# Patient Record
Sex: MALE | Race: WHITE | Employment: OTHER | URBAN - METROPOLITAN AREA
[De-identification: names, ages, dates, MRNs, and addresses within clinical notes are randomized per-mention and may not be internally consistent; named-entity substitution may affect disease eponyms.]

---

## 2020-01-01 ENCOUNTER — HOSPITAL ENCOUNTER (OUTPATIENT)
Age: 80
Setting detail: OUTPATIENT SURGERY
Discharge: HOME | End: 2020-12-14
Attending: INTERNAL MEDICINE | Admitting: INTERNAL MEDICINE
Payer: MEDICARE

## 2020-01-01 ENCOUNTER — PATIENT OUTREACH (OUTPATIENT)
Dept: CASE MANAGEMENT | Age: 80
End: 2020-01-01

## 2020-01-01 ENCOUNTER — HOSPITAL ENCOUNTER (OUTPATIENT)
Dept: MRI IMAGING | Age: 80
Discharge: HOME | End: 2020-12-30
Attending: INTERNAL MEDICINE
Payer: MEDICARE

## 2020-01-01 ENCOUNTER — HOSPITAL ENCOUNTER (OUTPATIENT)
Dept: INFUSION THERAPY | Age: 80
Discharge: HOME | End: 2020-12-28
Payer: MEDICARE

## 2020-01-01 ENCOUNTER — HOSPITAL ENCOUNTER (OUTPATIENT)
Dept: LAB | Age: 80
Discharge: HOME | End: 2020-12-28
Payer: MEDICARE

## 2020-01-01 ENCOUNTER — HOSPITAL ENCOUNTER (OUTPATIENT)
Dept: PET IMAGING | Age: 80
Discharge: HOME | End: 2020-12-22
Payer: MEDICARE

## 2020-01-01 VITALS
RESPIRATION RATE: 20 BRPM | BODY MASS INDEX: 30.06 KG/M2 | HEIGHT: 70 IN | DIASTOLIC BLOOD PRESSURE: 72 MMHG | OXYGEN SATURATION: 95 % | SYSTOLIC BLOOD PRESSURE: 142 MMHG | HEART RATE: 89 BPM | WEIGHT: 210 LBS | TEMPERATURE: 97.9 F

## 2020-01-01 DIAGNOSIS — C34.90 PRIMARY MALIGNANT NEOPLASM OF LUNG METASTATIC TO OTHER SITE, UNSPECIFIED LATERALITY (HCC): Primary | ICD-10-CM

## 2020-01-01 DIAGNOSIS — R91.8 LUNG MASS: ICD-10-CM

## 2020-01-01 DIAGNOSIS — C34.90 PRIMARY MALIGNANT NEOPLASM OF LUNG METASTATIC TO OTHER SITE, UNSPECIFIED LATERALITY (HCC): ICD-10-CM

## 2020-01-01 DIAGNOSIS — J90 PLEURAL EFFUSION: ICD-10-CM

## 2020-01-01 LAB
ALBUMIN SERPL-MCNC: 3 G/DL (ref 3.2–4.6)
ALBUMIN/GLOB SERPL: 0.6 {RATIO} (ref 1.2–3.5)
ALP SERPL-CCNC: 96 U/L (ref 50–136)
ALT SERPL-CCNC: 36 U/L (ref 12–65)
ANION GAP SERPL CALC-SCNC: 8 MMOL/L (ref 7–16)
APPEARANCE FLD: NORMAL
AST SERPL-CCNC: 17 U/L (ref 15–37)
BACTERIA SPEC CULT: NORMAL
BASOPHILS # BLD: 0.2 K/UL (ref 0–0.2)
BASOPHILS NFR BLD: 1 % (ref 0–2)
BILIRUB SERPL-MCNC: 0.3 MG/DL (ref 0.2–1.1)
BUN SERPL-MCNC: 26 MG/DL (ref 8–23)
CALCIUM SERPL-MCNC: 9.7 MG/DL (ref 8.3–10.4)
CHLORIDE SERPL-SCNC: 108 MMOL/L (ref 98–107)
CO2 SERPL-SCNC: 26 MMOL/L (ref 21–32)
COLOR FLD: NORMAL
CREAT SERPL-MCNC: 1.1 MG/DL (ref 0.8–1.5)
DIFFERENTIAL METHOD BLD: ABNORMAL
EOSINOPHIL # BLD: 2.7 K/UL (ref 0–0.8)
EOSINOPHIL NFR BLD: 14 % (ref 0.5–7.8)
EOSINOPHIL NFR BRONCH MANUAL: 7 %
ERYTHROCYTE [DISTWIDTH] IN BLOOD BY AUTOMATED COUNT: 14 % (ref 11.9–14.6)
FERRITIN SERPL-MCNC: 84 NG/ML (ref 8–388)
GLOBULIN SER CALC-MCNC: 4.8 G/DL (ref 2.3–3.5)
GLUCOSE FLD-MCNC: 104 MG/DL
GLUCOSE SERPL-MCNC: 104 MG/DL (ref 65–100)
GRAM STN SPEC: NORMAL
GRAM STN SPEC: NORMAL
HCT VFR BLD AUTO: 43.5 % (ref 41.1–50.3)
HGB BLD-MCNC: 14 G/DL (ref 13.6–17.2)
HGB RETIC QN AUTO: 33 PG (ref 29–35)
IMM GRANULOCYTES # BLD AUTO: 0.1 K/UL (ref 0–0.5)
IMM GRANULOCYTES NFR BLD AUTO: 0 % (ref 0–5)
IMM RETICS NFR: 8 % (ref 2.3–13.4)
IRON SATN MFR SERPL: 8 %
IRON SERPL-MCNC: 23 UG/DL (ref 35–150)
LDH FLD L TO P-CCNC: 384 U/L
LYMPHOCYTES # BLD: 1.6 K/UL (ref 0.5–4.6)
LYMPHOCYTES NFR BLD: 9 % (ref 13–44)
LYMPHOCYTES NFR BRONCH MANUAL: 86 %
MACROPHAGES NFR BRONCH MANUAL: 4 %
MAGNESIUM SERPL-MCNC: 2.3 MG/DL (ref 1.8–2.4)
MCH RBC QN AUTO: 28.9 PG (ref 26.1–32.9)
MCHC RBC AUTO-ENTMCNC: 32.2 G/DL (ref 31.4–35)
MCV RBC AUTO: 89.9 FL (ref 79.6–97.8)
MONOCYTES # BLD: 1.8 K/UL (ref 0.1–1.3)
MONOCYTES NFR BLD: 10 % (ref 4–12)
NEUTROPHILS NFR BRONCH MANUAL: 3 %
NEUTS SEG # BLD: 12.1 K/UL (ref 1.7–8.2)
NEUTS SEG NFR BLD: 66 % (ref 43–78)
NRBC # BLD: 0 K/UL (ref 0–0.2)
NUC CELL # FLD: 1868 /CU MM
PLATELET # BLD AUTO: 381 K/UL (ref 150–450)
PMV BLD AUTO: 8.8 FL (ref 9.4–12.3)
POTASSIUM SERPL-SCNC: 4.5 MMOL/L (ref 3.5–5.1)
PROT FLD-MCNC: 4.9 G/DL
PROT SERPL-MCNC: 7.8 G/DL (ref 6.3–8.2)
RBC # BLD AUTO: 4.84 M/UL (ref 4.23–5.67)
RBC # FLD: NORMAL /CU MM
RETICS # AUTO: 0.06 M/UL (ref 0.03–0.1)
RETICS/RBC NFR AUTO: 1.3 % (ref 0.3–2)
SERVICE CMNT-IMP: NORMAL
SODIUM SERPL-SCNC: 142 MMOL/L (ref 136–145)
SPECIMEN SOURCE FLD: NORMAL
TIBC SERPL-MCNC: 302 UG/DL (ref 250–450)
WBC # BLD AUTO: 18.4 K/UL (ref 4.3–11.1)

## 2020-01-01 PROCEDURE — 83735 ASSAY OF MAGNESIUM: CPT

## 2020-01-01 PROCEDURE — 77030014147 HC TY THORCENT PARA TELE -B: Performed by: INTERNAL MEDICINE

## 2020-01-01 PROCEDURE — 83615 LACTATE (LD) (LDH) ENZYME: CPT

## 2020-01-01 PROCEDURE — 88112 CYTOPATH CELL ENHANCE TECH: CPT

## 2020-01-01 PROCEDURE — 96372 THER/PROPH/DIAG INJ SC/IM: CPT

## 2020-01-01 PROCEDURE — A9575 INJ GADOTERATE MEGLUMI 0.1ML: HCPCS | Performed by: INTERNAL MEDICINE

## 2020-01-01 PROCEDURE — 76040000007: Performed by: INTERNAL MEDICINE

## 2020-01-01 PROCEDURE — 85025 COMPLETE CBC W/AUTO DIFF WBC: CPT

## 2020-01-01 PROCEDURE — 88341 IMHCHEM/IMCYTCHM EA ADD ANTB: CPT

## 2020-01-01 PROCEDURE — 82945 GLUCOSE OTHER FLUID: CPT

## 2020-01-01 PROCEDURE — 80053 COMPREHEN METABOLIC PANEL: CPT

## 2020-01-01 PROCEDURE — 89050 BODY FLUID CELL COUNT: CPT

## 2020-01-01 PROCEDURE — A9552 F18 FDG: HCPCS

## 2020-01-01 PROCEDURE — 36415 COLL VENOUS BLD VENIPUNCTURE: CPT

## 2020-01-01 PROCEDURE — 74011000636 HC RX REV CODE- 636: Performed by: INTERNAL MEDICINE

## 2020-01-01 PROCEDURE — 87102 FUNGUS ISOLATION CULTURE: CPT

## 2020-01-01 PROCEDURE — 32555 ASPIRATE PLEURA W/ IMAGING: CPT | Performed by: INTERNAL MEDICINE

## 2020-01-01 PROCEDURE — 84157 ASSAY OF PROTEIN OTHER: CPT

## 2020-01-01 PROCEDURE — 88342 IMHCHEM/IMCYTCHM 1ST ANTB: CPT

## 2020-01-01 PROCEDURE — 83550 IRON BINDING TEST: CPT

## 2020-01-01 PROCEDURE — 70553 MRI BRAIN STEM W/O & W/DYE: CPT

## 2020-01-01 PROCEDURE — 87205 SMEAR GRAM STAIN: CPT

## 2020-01-01 PROCEDURE — 74011250636 HC RX REV CODE- 250/636: Performed by: INTERNAL MEDICINE

## 2020-01-01 PROCEDURE — 85046 RETICYTE/HGB CONCENTRATE: CPT

## 2020-01-01 PROCEDURE — 87116 MYCOBACTERIA CULTURE: CPT

## 2020-01-01 PROCEDURE — 82728 ASSAY OF FERRITIN: CPT

## 2020-01-01 PROCEDURE — 88305 TISSUE EXAM BY PATHOLOGIST: CPT

## 2020-01-01 PROCEDURE — 2709999900 HC NON-CHARGEABLE SUPPLY: Performed by: INTERNAL MEDICINE

## 2020-01-01 RX ORDER — GADOTERATE MEGLUMINE 376.9 MG/ML
19 INJECTION INTRAVENOUS
Status: COMPLETED | OUTPATIENT
Start: 2020-01-01 | End: 2020-01-01

## 2020-01-01 RX ORDER — AMLODIPINE BESYLATE 5 MG/1
5 TABLET ORAL DAILY
COMMUNITY
End: 2021-01-01

## 2020-01-01 RX ORDER — CYANOCOBALAMIN 1000 UG/ML
1000 INJECTION, SOLUTION INTRAMUSCULAR; SUBCUTANEOUS ONCE
Status: COMPLETED | OUTPATIENT
Start: 2020-01-01 | End: 2020-01-01

## 2020-01-01 RX ORDER — SODIUM CHLORIDE 0.9 % (FLUSH) 0.9 %
10 SYRINGE (ML) INJECTION
Status: COMPLETED | OUTPATIENT
Start: 2020-01-01 | End: 2020-01-01

## 2020-01-01 RX ADMIN — Medication 10 ML: at 14:08

## 2020-01-01 RX ADMIN — DIATRIZOATE MEGLUMINE AND DIATRIZOATE SODIUM 10 ML: 660; 100 LIQUID ORAL; RECTAL at 14:08

## 2020-01-01 RX ADMIN — Medication 10 ML: at 11:40

## 2020-01-01 RX ADMIN — CYANOCOBALAMIN 1000 MCG: 1000 INJECTION, SOLUTION INTRAMUSCULAR; SUBCUTANEOUS at 16:21

## 2020-01-01 RX ADMIN — GADOTERATE MEGLUMINE 19 ML: 376.9 INJECTION INTRAVENOUS at 11:40

## 2020-12-14 PROBLEM — J90 PLEURAL EFFUSION: Status: ACTIVE | Noted: 2020-01-01

## 2020-12-14 NOTE — PROGRESS NOTES
Pt sat up on side of bed for thoracentesis. Consent obtained. Time out performed. Pts vitals monitored throughout procedure. Right ultrasound done and pic taken of pleural fluid. ~2300 ml serosanginous pleural fluid from right. Pt tolerated procedure well with no adverse rxn. Specimens sent to the lab x 3 and labeled appropriately. Site dressed appropriately. Lung sliding done and ultrasound findings reviewed by MD.       Discharge instructions reviewed with pt and wife then pt discharged into her care ambulating out of department.

## 2020-12-14 NOTE — DISCHARGE INSTRUCTIONS
Patient Education        Thoracentesis: What to Expect at Home  Your Recovery  Thoracentesis (say \"kywz-bl-nfq-ARJUN-sis\") is a procedure to remove fluid from the space between the lungs and the chest wall (pleural space). This procedure may also be called a \"chest tap. \" It's normal to have a small amount of fluid in the pleural space. But too much fluid can build up because of problems such as infection, heart failure, or lung cancer. The procedure may have been done to help with shortness of breath and pain caused by the fluid buildup. Or you may have had this procedure so the doctor could test the fluid to find the cause of the buildup. Your chest may be sore where the doctor put the needle or catheter into your skin (the puncture site). This usually gets better after a day or two. You can go back to work or your normal activities as soon as you feel up to it. If a large amount of pleural fluid was removed during the procedure, you will probably be able to breathe more easily. If more pleural fluid collects and needs to be removed, another thoracentesis may be done later. If the doctor sent the fluid to a lab for testing, it may take several days to get the results. The doctor or nurse will discuss the results with you. This care sheet gives you a general idea about how long it will take for you to recover. But each person recovers at a different pace. Follow the steps below to feel better as quickly as possible. How can you care for yourself at home? Activity    · Rest when you feel tired. Getting enough sleep will help you recover.     · Avoid strenuous activities, such as bicycle riding, jogging, weight lifting, or aerobic exercise, until your doctor says it is okay.     · You may shower. Do not take a bath until the puncture site has healed, or until your doctor tells you it is okay.     · Ask your doctor when you can drive again.     · You may need to take 1 or 2 days off from work.  It depends on the type of work you do and how you feel. Diet    · You can eat your normal diet.     · Drink plenty of fluids (unless your doctor tells you not to). Medicines    · Your doctor will tell you if and when you can restart your medicines. He or she will also give you instructions about taking any new medicines.     · If you take aspirin or some other blood thinner, ask your doctor if and when to start taking it again. Make sure that you understand exactly what your doctor wants you to do.     · Be safe with medicines. Take pain medicines exactly as directed. ? If the doctor gave you a prescription medicine for pain, take it as prescribed. ? If you are not taking a prescription pain medicine, ask your doctor if you can take an over-the-counter medicine. ? Do not take two or more pain medicines at the same time unless the doctor told you to. Many pain medicines have acetaminophen, which is Tylenol. Too much acetaminophen (Tylenol) can be harmful.     · If you think your pain medicine is making you sick to your stomach:  ? Take your medicine after meals (unless your doctor has told you not to). ? Ask your doctor for a different pain medicine.     · If your doctor prescribed antibiotics, take them as directed. Do not stop taking them just because you feel better. You need to take the full course of antibiotics. Care of the puncture site    · Wash the area daily with warm, soapy water, and pat it dry. Don't use hydrogen peroxide or alcohol, which may delay healing. You may cover the area with a gauze bandage if it weeps or rubs against clothing. Change the bandage every day.     · Keep the area clean and dry. Follow-up care is a key part of your treatment and safety. Be sure to make and go to all appointments, and call your doctor if you are having problems. It's also a good idea to know your test results and keep a list of the medicines you take.     Please keep bandage dry for next 24 hours and keep all follow-up appointments. Please call [facility] @ [phone] for questions or concerns. When should you call for help? Call 911 anytime you think you may need emergency care. For example, call if:    · You passed out (lost consciousness).     · You have severe trouble breathing.     · You have sudden chest pain and shortness of breath, or you cough up blood. Call your doctor now or seek immediate medical care if:    · You have shortness of breath that is new or getting worse.     · You have new or worse pain in your chest, especially when you take a deep breath.     · You are sick to your stomach or cannot keep fluids down.     · You have a fever over 100°F.     · Bright red blood has soaked through the bandage over your puncture site.     · You have signs of infection, such as:  ? Increased pain, swelling, warmth, or redness. ? Red streaks leading from the puncture site. ? Pus draining from the puncture site. ? Swollen lymph nodes in your neck, armpits, or groin. ? A fever.     · You cough up a lot more mucus than normal, or your mucus changes color. Watch closely for changes in your health, and be sure to contact your doctor if you have any problems. Where can you learn more? Go to http://www.gray.com/  Enter Q755 in the search box to learn more about \"Thoracentesis: What to Expect at Home. \"  Current as of: February 24, 2020               Content Version: 12.6  © 8704-3343 [employer]. Care instructions adapted under license by [employer] (which disclaims liability or warranty for this information). If you have questions about a medical condition or this instruction, always ask your healthcare professional. [place] [address] any warranty or liability for your use of this information.

## 2020-12-14 NOTE — PROCEDURES
PROCEDURE:    DIAGNOSTIC/THERAPEUTIC THORACENTESIS/PLEURAL MANNOMETRY. PRE-OP DIAGNOSIS:    RPLEURAL EFFUSION    POST-OP DIAGNOSIS:    R PLEURAL EFFUSION    ASSISTANT:    Plumbly    ANESTHESIA:    LOCAL ANESTHESIA WITH 1% LIDOCAINE 10 CC TOTAL. CHEST ULTRASOUND FINDINGS:    A Turbo-M, Sonosite ultrasound with a 5-16 mHz probe was used to image the chest and localize the pleural effusion on the Left/and/Right chest.    A large/ anechoic space was seen on theRight consistent with an uncomplicated pleural effusion. DESCRIPTION OF PROCEDURE:    After obtaining informed consent and localizing the safest location for thoracentesis, the  9th intercostal space was marked with a blunt, plastic needle cap in the mid scapular line. An [employer] AK-0100 Pleral-Seal thoracentesis kit was used to perform the procedure. The skin was  cleansed with the supplied  chlorhexididne swab and then draped in the usual fasion. Using the previously marked location as a giude, a 22 G 1.5 inch needle was used to inject 10 cc of 1% lidocaine into the skin and subcutaneous tissue, as well as onto the underlying rib and inter-costal muscles, pleural fluid was aspirated to assure proper location, prior to removing the anesthesia needle. A 3mm  incision was then made, with the supplied scalpel in the usual fashion to facilitate the insertiopn of the thoracentesis needle. The needle with an 8French thoracentesis catheter was then introduced into the chest through the previously made incision in the usual fashion, the rib localized with the needle, and the catheter then marched over the rib into the pleural space. After aspirating fluid, the thoracentesis catheter was then placed into the chest using the needle itself as a trocar. The needle was then removed and the catheter was attached to the supplied tubing without complication.     2200 cc of /Yellow/ fluid, was aspirated and sent for analysis. Fluid was sent for the following tests:    Cell count with differential  LDH  Glucose  Total protein  Cytology    AFB  Fungus  Routine culture and Gram stain      Post procedure US confirmed complete/incomplete drainage of the effusion.     EBL:     minimal      COMPLICATIONS:    none    Pawel Baca MD

## 2020-12-14 NOTE — H&P
Date of Surgery Update:  Rashaun South was seen and examined. History and physical has been reviewed. The patient has been examined.  There have been no significant clinical changes since the completion of the originally dated History and Physical.    Signed By: Zenia Donaldson MD     December 14, 2020 10:17 AM

## 2020-12-28 PROBLEM — C34.90 PRIMARY MALIGNANT NEOPLASM OF LUNG METASTATIC TO OTHER SITE (HCC): Status: ACTIVE | Noted: 2020-01-01

## 2020-12-28 NOTE — PROGRESS NOTES
Pt arrived ambulatory to [facility]. Vit B12 IM. Pt aware of next appt on 1/13/21 at 0830. Discharged ambulatory.

## 2020-12-29 NOTE — PROGRESS NOTES
I saw patient on 12-28-20 with Dr Obinna Goodson. He presents today with wife. After much discussion, he hs decided to do chemo/ immunotherapy. We discussed port placement and port book was reviewed with patient. We will schedule chemo ed/FC and chemo. NGS sent on 12-29-20 on Cytology studies. Dr Obinna Goodson stated okay to do adrenal bx if needed if caris does not have enough tissue with Cytology studies.

## 2020-12-30 NOTE — PROGRESS NOTES
I spoke with patient and he stated he was having more pain lately especially at night. I talked to him about our palliative care dept and comfort care. Next appt would be 1-6-21 but I requested pt call if he felt he needed to be seen sooner. Pt was in agreement with this.

## 2021-01-01 ENCOUNTER — APPOINTMENT (OUTPATIENT)
Dept: CT IMAGING | Age: 81
DRG: 871 | End: 2021-01-01
Attending: INTERNAL MEDICINE
Payer: MEDICARE

## 2021-01-01 ENCOUNTER — HOSPITAL ENCOUNTER (OUTPATIENT)
Dept: LAB | Age: 81
Discharge: HOME | End: 2021-01-19
Payer: MEDICARE

## 2021-01-01 ENCOUNTER — HOSPITAL ENCOUNTER (OUTPATIENT)
Dept: RADIATION ONCOLOGY | Age: 81
Discharge: HOME | End: 2021-03-09
Payer: MEDICARE

## 2021-01-01 ENCOUNTER — APPOINTMENT (OUTPATIENT)
Dept: INFUSION THERAPY | Age: 81
End: 2021-01-01

## 2021-01-01 ENCOUNTER — HOSPITAL ENCOUNTER (OUTPATIENT)
Dept: CT IMAGING | Age: 81
Setting detail: OUTPATIENT SURGERY
Discharge: HOME | End: 2021-01-11
Attending: INTERNAL MEDICINE | Admitting: INTERNAL MEDICINE
Payer: MEDICARE

## 2021-01-01 ENCOUNTER — HOSPITAL ENCOUNTER (OUTPATIENT)
Dept: LAB | Age: 81
Discharge: HOME | End: 2021-01-12
Payer: MEDICARE

## 2021-01-01 ENCOUNTER — HOSPITAL ENCOUNTER (OUTPATIENT)
Dept: LAB | Age: 81
Discharge: HOME | End: 2021-02-02
Payer: MEDICARE

## 2021-01-01 ENCOUNTER — HOSPITAL ENCOUNTER (OUTPATIENT)
Dept: INFUSION THERAPY | Age: 81
Discharge: HOME | End: 2021-01-14
Payer: MEDICARE

## 2021-01-01 ENCOUNTER — HOSPITAL ENCOUNTER (OUTPATIENT)
Age: 81
Setting detail: OUTPATIENT SURGERY
Discharge: HOME | End: 2021-01-07
Attending: INTERNAL MEDICINE | Admitting: INTERNAL MEDICINE
Payer: MEDICARE

## 2021-01-01 ENCOUNTER — APPOINTMENT (OUTPATIENT)
Dept: GENERAL RADIOLOGY | Age: 81
DRG: 871 | End: 2021-01-01
Attending: EMERGENCY MEDICINE
Payer: MEDICARE

## 2021-01-01 ENCOUNTER — HOSPITAL ENCOUNTER (OUTPATIENT)
Dept: RADIATION ONCOLOGY | Age: 81
Discharge: HOME | End: 2021-03-10
Payer: MEDICARE

## 2021-01-01 ENCOUNTER — APPOINTMENT (OUTPATIENT)
Dept: RADIATION ONCOLOGY | Age: 81
End: 2021-01-01
Payer: MEDICARE

## 2021-01-01 ENCOUNTER — HOSPITAL ENCOUNTER (OUTPATIENT)
Dept: GENERAL RADIOLOGY | Age: 81
Discharge: HOME | End: 2021-01-06

## 2021-01-01 ENCOUNTER — HOSPITAL ENCOUNTER (OUTPATIENT)
Dept: RADIATION ONCOLOGY | Age: 81
Discharge: HOME | End: 2021-02-17
Payer: MEDICARE

## 2021-01-01 ENCOUNTER — HOSPITAL ENCOUNTER (OUTPATIENT)
Dept: RADIATION ONCOLOGY | Age: 81
Discharge: HOME | End: 2021-03-08
Payer: MEDICARE

## 2021-01-01 ENCOUNTER — HOSPICE ADMISSION (OUTPATIENT)
Dept: HOSPICE | Facility: HOSPICE | Age: 81
End: 2021-01-01
Payer: MEDICARE

## 2021-01-01 ENCOUNTER — HOSPITAL ENCOUNTER (INPATIENT)
Age: 81
LOS: 11 days | End: 2021-03-30
Attending: INTERNAL MEDICINE | Admitting: INTERNAL MEDICINE
Payer: MEDICARE

## 2021-01-01 ENCOUNTER — APPOINTMENT (OUTPATIENT)
Dept: GENERAL RADIOLOGY | Age: 81
DRG: 071 | End: 2021-01-01
Attending: EMERGENCY MEDICINE
Payer: MEDICARE

## 2021-01-01 ENCOUNTER — HOSPITAL ENCOUNTER (OUTPATIENT)
Dept: LAB | Age: 81
Discharge: HOME | End: 2021-01-27
Payer: MEDICARE

## 2021-01-01 ENCOUNTER — PATIENT OUTREACH (OUTPATIENT)
Dept: CASE MANAGEMENT | Age: 81
End: 2021-01-01

## 2021-01-01 ENCOUNTER — APPOINTMENT (OUTPATIENT)
Dept: GENERAL RADIOLOGY | Age: 81
DRG: 871 | End: 2021-01-01
Attending: INTERNAL MEDICINE
Payer: MEDICARE

## 2021-01-01 ENCOUNTER — HOSPITAL ENCOUNTER (INPATIENT)
Age: 81
LOS: 7 days | Discharge: HOSPICE | DRG: 871 | End: 2021-03-19
Attending: EMERGENCY MEDICINE | Admitting: FAMILY MEDICINE
Payer: MEDICARE

## 2021-01-01 ENCOUNTER — APPOINTMENT (OUTPATIENT)
Dept: MRI IMAGING | Age: 81
DRG: 071 | End: 2021-01-01
Attending: PSYCHIATRY & NEUROLOGY
Payer: MEDICARE

## 2021-01-01 ENCOUNTER — HOSPITAL ENCOUNTER (OUTPATIENT)
Dept: INTERVENTIONAL RADIOLOGY/VASCULAR | Age: 81
Discharge: HOME | End: 2021-01-07
Attending: INTERNAL MEDICINE
Payer: MEDICARE

## 2021-01-01 ENCOUNTER — HOSPITAL ENCOUNTER (OUTPATIENT)
Dept: RADIATION ONCOLOGY | Age: 81
Discharge: HOME | End: 2021-03-03
Payer: MEDICARE

## 2021-01-01 ENCOUNTER — HOSPITAL ENCOUNTER (OUTPATIENT)
Dept: RADIATION ONCOLOGY | Age: 81
Discharge: HOME | End: 2021-02-18
Payer: MEDICARE

## 2021-01-01 ENCOUNTER — HOSPITAL ENCOUNTER (OUTPATIENT)
Dept: RADIATION ONCOLOGY | Age: 81
Discharge: HOME | End: 2021-02-23
Payer: MEDICARE

## 2021-01-01 ENCOUNTER — APPOINTMENT (OUTPATIENT)
Dept: ULTRASOUND IMAGING | Age: 81
DRG: 071 | End: 2021-01-01
Attending: INTERNAL MEDICINE
Payer: MEDICARE

## 2021-01-01 ENCOUNTER — APPOINTMENT (OUTPATIENT)
Dept: GENERAL RADIOLOGY | Age: 81
DRG: 871 | End: 2021-01-01
Attending: FAMILY MEDICINE
Payer: MEDICARE

## 2021-01-01 ENCOUNTER — ANESTHESIA (OUTPATIENT)
Dept: SURGERY | Age: 81
End: 2021-01-01
Payer: MEDICARE

## 2021-01-01 ENCOUNTER — HOSPITAL ENCOUNTER (OUTPATIENT)
Dept: LAB | Age: 81
Discharge: HOME | End: 2021-02-23
Payer: MEDICARE

## 2021-01-01 ENCOUNTER — HOSPITAL ENCOUNTER (OUTPATIENT)
Dept: MRI IMAGING | Age: 81
Discharge: HOME | End: 2021-02-17
Attending: INTERNAL MEDICINE
Payer: MEDICARE

## 2021-01-01 ENCOUNTER — HOSPITAL ENCOUNTER (OUTPATIENT)
Dept: RADIATION ONCOLOGY | Age: 81
Discharge: HOME | End: 2021-03-01
Payer: MEDICARE

## 2021-01-01 ENCOUNTER — HOSPITAL ENCOUNTER (OUTPATIENT)
Dept: CT IMAGING | Age: 81
Discharge: HOME | End: 2021-01-13
Attending: INTERNAL MEDICINE | Admitting: INTERNAL MEDICINE
Payer: MEDICARE

## 2021-01-01 ENCOUNTER — HOSPITAL ENCOUNTER (OUTPATIENT)
Dept: INFUSION THERAPY | Age: 81
End: 2021-01-01

## 2021-01-01 ENCOUNTER — HOSPITAL ENCOUNTER (OUTPATIENT)
Dept: ULTRASOUND IMAGING | Age: 81
Discharge: HOME | End: 2021-01-04
Attending: NURSE PRACTITIONER
Payer: MEDICARE

## 2021-01-01 ENCOUNTER — HOSPITAL ENCOUNTER (INPATIENT)
Age: 81
LOS: 5 days | Discharge: NURSING, REHAB OR LONG-TERM CARE | DRG: 071 | End: 2021-03-09
Attending: EMERGENCY MEDICINE | Admitting: INTERNAL MEDICINE
Payer: MEDICARE

## 2021-01-01 ENCOUNTER — HOSPITAL ENCOUNTER (OUTPATIENT)
Dept: RADIATION ONCOLOGY | Age: 81
Discharge: HOME | End: 2021-03-11
Payer: MEDICARE

## 2021-01-01 ENCOUNTER — APPOINTMENT (OUTPATIENT)
Dept: CT IMAGING | Age: 81
DRG: 071 | End: 2021-01-01
Attending: EMERGENCY MEDICINE
Payer: MEDICARE

## 2021-01-01 ENCOUNTER — TELEPHONE (OUTPATIENT)
Dept: CASE MANAGEMENT | Age: 81
End: 2021-01-01

## 2021-01-01 ENCOUNTER — APPOINTMENT (OUTPATIENT)
Dept: ULTRASOUND IMAGING | Age: 81
DRG: 871 | End: 2021-01-01
Attending: INTERNAL MEDICINE
Payer: MEDICARE

## 2021-01-01 ENCOUNTER — HOSPITAL ENCOUNTER (OUTPATIENT)
Dept: RADIATION ONCOLOGY | Age: 81
Discharge: HOME | End: 2021-02-24
Payer: MEDICARE

## 2021-01-01 ENCOUNTER — HOSPITAL ENCOUNTER (OUTPATIENT)
Dept: PET IMAGING | Age: 81
Discharge: HOME | End: 2021-02-16
Payer: MEDICARE

## 2021-01-01 ENCOUNTER — HOSPITAL ENCOUNTER (OUTPATIENT)
Dept: RADIATION ONCOLOGY | Age: 81
Discharge: HOME | End: 2021-03-02
Payer: MEDICARE

## 2021-01-01 ENCOUNTER — HOSPITAL ENCOUNTER (OUTPATIENT)
Dept: INFUSION THERAPY | Age: 81
Discharge: HOME | End: 2021-02-03
Payer: MEDICARE

## 2021-01-01 ENCOUNTER — APPOINTMENT (OUTPATIENT)
Dept: GENERAL RADIOLOGY | Age: 81
DRG: 071 | End: 2021-01-01
Attending: NURSE PRACTITIONER
Payer: MEDICARE

## 2021-01-01 ENCOUNTER — ANESTHESIA EVENT (OUTPATIENT)
Dept: SURGERY | Age: 81
End: 2021-01-01
Payer: MEDICARE

## 2021-01-01 VITALS
DIASTOLIC BLOOD PRESSURE: 77 MMHG | OXYGEN SATURATION: 94 % | HEART RATE: 82 BPM | TEMPERATURE: 98.6 F | SYSTOLIC BLOOD PRESSURE: 146 MMHG | BODY MASS INDEX: 31.39 KG/M2 | RESPIRATION RATE: 16 BRPM | WEIGHT: 200 LBS | HEIGHT: 67 IN

## 2021-01-01 VITALS
HEART RATE: 100 BPM | RESPIRATION RATE: 18 BRPM | DIASTOLIC BLOOD PRESSURE: 36 MMHG | SYSTOLIC BLOOD PRESSURE: 69 MMHG | TEMPERATURE: 99.1 F

## 2021-01-01 VITALS
OXYGEN SATURATION: 96 % | WEIGHT: 205 LBS | DIASTOLIC BLOOD PRESSURE: 75 MMHG | HEART RATE: 76 BPM | RESPIRATION RATE: 18 BRPM | BODY MASS INDEX: 32.11 KG/M2 | TEMPERATURE: 97.9 F | SYSTOLIC BLOOD PRESSURE: 120 MMHG

## 2021-01-01 VITALS
SYSTOLIC BLOOD PRESSURE: 148 MMHG | WEIGHT: 207.2 LBS | DIASTOLIC BLOOD PRESSURE: 92 MMHG | BODY MASS INDEX: 32.45 KG/M2 | TEMPERATURE: 96.4 F | OXYGEN SATURATION: 97 % | HEART RATE: 106 BPM

## 2021-01-01 VITALS
HEART RATE: 71 BPM | HEIGHT: 72 IN | TEMPERATURE: 97.7 F | WEIGHT: 200 LBS | BODY MASS INDEX: 27.09 KG/M2 | DIASTOLIC BLOOD PRESSURE: 94 MMHG | RESPIRATION RATE: 18 BRPM | OXYGEN SATURATION: 97 % | SYSTOLIC BLOOD PRESSURE: 163 MMHG

## 2021-01-01 VITALS
DIASTOLIC BLOOD PRESSURE: 81 MMHG | WEIGHT: 204 LBS | OXYGEN SATURATION: 95 % | RESPIRATION RATE: 18 BRPM | SYSTOLIC BLOOD PRESSURE: 157 MMHG | HEART RATE: 98 BPM | BODY MASS INDEX: 31.95 KG/M2 | TEMPERATURE: 97.9 F

## 2021-01-01 VITALS
HEART RATE: 91 BPM | OXYGEN SATURATION: 99 % | TEMPERATURE: 97.8 F | RESPIRATION RATE: 18 BRPM | SYSTOLIC BLOOD PRESSURE: 162 MMHG | DIASTOLIC BLOOD PRESSURE: 80 MMHG

## 2021-01-01 VITALS
OXYGEN SATURATION: 96 % | DIASTOLIC BLOOD PRESSURE: 96 MMHG | HEART RATE: 104 BPM | HEIGHT: 67 IN | TEMPERATURE: 97.4 F | RESPIRATION RATE: 30 BRPM | SYSTOLIC BLOOD PRESSURE: 181 MMHG | BODY MASS INDEX: 32.32 KG/M2 | WEIGHT: 205.91 LBS

## 2021-01-01 VITALS
RESPIRATION RATE: 20 BRPM | OXYGEN SATURATION: 99 % | TEMPERATURE: 97.9 F | DIASTOLIC BLOOD PRESSURE: 65 MMHG | HEART RATE: 88 BPM | HEIGHT: 67 IN | SYSTOLIC BLOOD PRESSURE: 135 MMHG | WEIGHT: 205 LBS | BODY MASS INDEX: 32.18 KG/M2

## 2021-01-01 VITALS
TEMPERATURE: 98.1 F | RESPIRATION RATE: 18 BRPM | DIASTOLIC BLOOD PRESSURE: 79 MMHG | SYSTOLIC BLOOD PRESSURE: 162 MMHG | OXYGEN SATURATION: 95 % | HEART RATE: 107 BPM

## 2021-01-01 DIAGNOSIS — J90 PLEURAL EFFUSION: ICD-10-CM

## 2021-01-01 DIAGNOSIS — I63.9 CEREBROVASCULAR ACCIDENT (CVA), UNSPECIFIED MECHANISM (HCC): Primary | ICD-10-CM

## 2021-01-01 DIAGNOSIS — E87.0 HYPERNATREMIA: ICD-10-CM

## 2021-01-01 DIAGNOSIS — R91.8 OTHER NONSPECIFIC ABNORMAL FINDING OF LUNG FIELD: ICD-10-CM

## 2021-01-01 DIAGNOSIS — N17.9 AKI (ACUTE KIDNEY INJURY) (HCC): ICD-10-CM

## 2021-01-01 DIAGNOSIS — D72.9 NEUTROPHILIC LEUKOCYTOSIS: ICD-10-CM

## 2021-01-01 DIAGNOSIS — J18.9 HCAP (HEALTHCARE-ASSOCIATED PNEUMONIA): ICD-10-CM

## 2021-01-01 DIAGNOSIS — C34.91 NON-SMALL CELL CANCER OF RIGHT LUNG (HCC): ICD-10-CM

## 2021-01-01 DIAGNOSIS — C34.91 MALIGNANT NEOPLASM OF RIGHT LUNG, UNSPECIFIED PART OF LUNG (HCC): ICD-10-CM

## 2021-01-01 DIAGNOSIS — C34.90 PRIMARY MALIGNANT NEOPLASM OF LUNG METASTATIC TO OTHER SITE, UNSPECIFIED LATERALITY (HCC): ICD-10-CM

## 2021-01-01 DIAGNOSIS — C34.91 ADENOCARCINOMA, LUNG, RIGHT (HCC): ICD-10-CM

## 2021-01-01 DIAGNOSIS — J96.01 ACUTE RESPIRATORY FAILURE WITH HYPOXIA (HCC): ICD-10-CM

## 2021-01-01 DIAGNOSIS — C34.90 NON-SMALL CELL LUNG CANCER, UNSPECIFIED LATERALITY (HCC): ICD-10-CM

## 2021-01-01 DIAGNOSIS — C34.90 NON-SMALL CELL LUNG CANCER, UNSPECIFIED LATERALITY (HCC): Primary | ICD-10-CM

## 2021-01-01 DIAGNOSIS — Z79.899 HIGH RISK MEDICATION USE: ICD-10-CM

## 2021-01-01 DIAGNOSIS — C34.91 ADENOCARCINOMA OF RIGHT LUNG (HCC): ICD-10-CM

## 2021-01-01 DIAGNOSIS — E44.1 MILD PROTEIN-CALORIE MALNUTRITION (HCC): Chronic | ICD-10-CM

## 2021-01-01 DIAGNOSIS — R65.20 SEVERE SEPSIS (HCC): ICD-10-CM

## 2021-01-01 DIAGNOSIS — J96.01 ACUTE HYPOXEMIC RESPIRATORY FAILURE (HCC): ICD-10-CM

## 2021-01-01 DIAGNOSIS — F01.52: ICD-10-CM

## 2021-01-01 DIAGNOSIS — G92.8 TOXIC METABOLIC ENCEPHALOPATHY: ICD-10-CM

## 2021-01-01 DIAGNOSIS — G95.9 SPINAL CORD LESION (HCC): ICD-10-CM

## 2021-01-01 DIAGNOSIS — A41.9 SEVERE SEPSIS (HCC): ICD-10-CM

## 2021-01-01 DIAGNOSIS — R29.90 STROKE-LIKE SYMPTOMS: ICD-10-CM

## 2021-01-01 DIAGNOSIS — M79.89 LEFT LEG SWELLING: ICD-10-CM

## 2021-01-01 DIAGNOSIS — R33.9 URINARY RETENTION: ICD-10-CM

## 2021-01-01 LAB
ACC. NO. FROM MICRO ORDER, ACCP: ABNORMAL
ACID FAST STN SPEC: NEGATIVE
ALBUMIN SERPL-MCNC: 2 G/DL (ref 3.2–4.6)
ALBUMIN SERPL-MCNC: 2.3 G/DL (ref 3.2–4.6)
ALBUMIN SERPL-MCNC: 2.4 G/DL (ref 3.2–4.6)
ALBUMIN SERPL-MCNC: 2.7 G/DL (ref 3.2–4.6)
ALBUMIN SERPL-MCNC: 2.7 G/DL (ref 3.2–4.6)
ALBUMIN SERPL-MCNC: 2.8 G/DL (ref 3.2–4.6)
ALBUMIN/GLOB SERPL: 0.5 {RATIO} (ref 1.2–3.5)
ALBUMIN/GLOB SERPL: 0.5 {RATIO} (ref 1.2–3.5)
ALBUMIN/GLOB SERPL: 0.6 {RATIO} (ref 1.2–3.5)
ALBUMIN/GLOB SERPL: 0.8 {RATIO} (ref 1.2–3.5)
ALP SERPL-CCNC: 102 U/L (ref 50–136)
ALP SERPL-CCNC: 108 U/L (ref 50–136)
ALP SERPL-CCNC: 119 U/L (ref 50–136)
ALP SERPL-CCNC: 70 U/L (ref 50–136)
ALP SERPL-CCNC: 88 U/L (ref 50–136)
ALP SERPL-CCNC: 92 U/L (ref 50–136)
ALT SERPL-CCNC: 110 U/L (ref 12–65)
ALT SERPL-CCNC: 34 U/L (ref 12–65)
ALT SERPL-CCNC: 34 U/L (ref 12–65)
ALT SERPL-CCNC: 54 U/L (ref 12–65)
ALT SERPL-CCNC: 67 U/L (ref 12–65)
ALT SERPL-CCNC: 74 U/L (ref 12–65)
AMMONIA PLAS-SCNC: 27 UMOL/L (ref 11–32)
ANION GAP SERPL CALC-SCNC: 3 MMOL/L (ref 7–16)
ANION GAP SERPL CALC-SCNC: 5 MMOL/L (ref 7–16)
ANION GAP SERPL CALC-SCNC: 6 MMOL/L (ref 7–16)
ANION GAP SERPL CALC-SCNC: 7 MMOL/L (ref 7–16)
ANION GAP SERPL CALC-SCNC: 8 MMOL/L (ref 7–16)
APPEARANCE UR: ABNORMAL
APPEARANCE UR: CLEAR
ARTERIAL PATENCY WRIST A: YES
ARTERIAL PATENCY WRIST A: YES
AST SERPL-CCNC: 14 U/L (ref 15–37)
AST SERPL-CCNC: 20 U/L (ref 15–37)
AST SERPL-CCNC: 20 U/L (ref 15–37)
AST SERPL-CCNC: 23 U/L (ref 15–37)
AST SERPL-CCNC: 31 U/L (ref 15–37)
AST SERPL-CCNC: 9 U/L (ref 15–37)
ATRIAL RATE: 107 BPM
ATRIAL RATE: 61 BPM
ATRIAL RATE: 99 BPM
BACTERIA SPEC CULT: ABNORMAL
BACTERIA SPEC CULT: ABNORMAL
BACTERIA SPEC CULT: NORMAL
BACTERIA URNS QL MICRO: ABNORMAL /HPF
BASE DEFICIT BLD-SCNC: 1 MMOL/L
BASE DEFICIT BLD-SCNC: 2 MMOL/L
BASOPHILS # BLD: 0 K/UL (ref 0–0.2)
BASOPHILS # BLD: 0.1 K/UL (ref 0–0.2)
BASOPHILS # BLD: 0.3 K/UL (ref 0–0.2)
BASOPHILS NFR BLD: 0 % (ref 0–2)
BASOPHILS NFR BLD: 1 % (ref 0–2)
BDY SITE: ABNORMAL
BDY SITE: ABNORMAL
BILIRUB SERPL-MCNC: 0.2 MG/DL (ref 0.2–1.1)
BILIRUB SERPL-MCNC: 0.3 MG/DL (ref 0.2–1.1)
BILIRUB SERPL-MCNC: 0.5 MG/DL (ref 0.2–1.1)
BILIRUB SERPL-MCNC: 0.6 MG/DL (ref 0.2–1.1)
BILIRUB UR QL: NEGATIVE
BILIRUB UR QL: NEGATIVE
BUN SERPL-MCNC: 111 MG/DL (ref 8–23)
BUN SERPL-MCNC: 122 MG/DL (ref 8–23)
BUN SERPL-MCNC: 123 MG/DL (ref 8–23)
BUN SERPL-MCNC: 127 MG/DL (ref 8–23)
BUN SERPL-MCNC: 25 MG/DL (ref 8–23)
BUN SERPL-MCNC: 25 MG/DL (ref 8–23)
BUN SERPL-MCNC: 30 MG/DL (ref 8–23)
BUN SERPL-MCNC: 37 MG/DL (ref 8–23)
BUN SERPL-MCNC: 41 MG/DL (ref 8–23)
BUN SERPL-MCNC: 41 MG/DL (ref 8–23)
BUN SERPL-MCNC: 44 MG/DL (ref 8–23)
BUN SERPL-MCNC: 46 MG/DL (ref 8–23)
BUN SERPL-MCNC: 52 MG/DL (ref 8–23)
BUN SERPL-MCNC: 53 MG/DL (ref 8–23)
BUN SERPL-MCNC: 56 MG/DL (ref 8–23)
BUN SERPL-MCNC: 69 MG/DL (ref 8–23)
BUN SERPL-MCNC: 74 MG/DL (ref 8–23)
BUN SERPL-MCNC: 75 MG/DL (ref 8–23)
CALCIUM SERPL-MCNC: 7.9 MG/DL (ref 8.3–10.4)
CALCIUM SERPL-MCNC: 8.1 MG/DL (ref 8.3–10.4)
CALCIUM SERPL-MCNC: 8.1 MG/DL (ref 8.3–10.4)
CALCIUM SERPL-MCNC: 8.3 MG/DL (ref 8.3–10.4)
CALCIUM SERPL-MCNC: 8.4 MG/DL (ref 8.3–10.4)
CALCIUM SERPL-MCNC: 8.5 MG/DL (ref 8.3–10.4)
CALCIUM SERPL-MCNC: 8.6 MG/DL (ref 8.3–10.4)
CALCIUM SERPL-MCNC: 8.8 MG/DL (ref 8.3–10.4)
CALCIUM SERPL-MCNC: 8.9 MG/DL (ref 8.3–10.4)
CALCIUM SERPL-MCNC: 8.9 MG/DL (ref 8.3–10.4)
CALCIUM SERPL-MCNC: 9 MG/DL (ref 8.3–10.4)
CALCIUM SERPL-MCNC: 9.2 MG/DL (ref 8.3–10.4)
CALCIUM SERPL-MCNC: 9.4 MG/DL (ref 8.3–10.4)
CALCIUM SERPL-MCNC: 9.6 MG/DL (ref 8.3–10.4)
CALCULATED P AXIS, ECG09: 46 DEGREES
CALCULATED R AXIS, ECG10: 84 DEGREES
CALCULATED R AXIS, ECG10: 84 DEGREES
CALCULATED R AXIS, ECG10: 88 DEGREES
CALCULATED T AXIS, ECG11: 32 DEGREES
CALCULATED T AXIS, ECG11: 33 DEGREES
CALCULATED T AXIS, ECG11: 46 DEGREES
CASTS URNS QL MICRO: ABNORMAL /LPF
CHLORIDE SERPL-SCNC: 104 MMOL/L (ref 98–107)
CHLORIDE SERPL-SCNC: 106 MMOL/L (ref 98–107)
CHLORIDE SERPL-SCNC: 107 MMOL/L (ref 98–107)
CHLORIDE SERPL-SCNC: 107 MMOL/L (ref 98–107)
CHLORIDE SERPL-SCNC: 109 MMOL/L (ref 98–107)
CHLORIDE SERPL-SCNC: 111 MMOL/L (ref 98–107)
CHLORIDE SERPL-SCNC: 112 MMOL/L (ref 98–107)
CHLORIDE SERPL-SCNC: 112 MMOL/L (ref 98–107)
CHLORIDE SERPL-SCNC: 113 MMOL/L (ref 98–107)
CHLORIDE SERPL-SCNC: 115 MMOL/L (ref 98–107)
CHLORIDE SERPL-SCNC: 121 MMOL/L (ref 98–107)
CHLORIDE SERPL-SCNC: 122 MMOL/L (ref 98–107)
CHLORIDE SERPL-SCNC: 124 MMOL/L (ref 98–107)
CHLORIDE SERPL-SCNC: 127 MMOL/L (ref 98–107)
CHOLEST SERPL-MCNC: 161 MG/DL
CO2 BLD-SCNC: 24 MMOL/L
CO2 BLD-SCNC: 24 MMOL/L (ref 13–23)
CO2 SERPL-SCNC: 22 MMOL/L (ref 21–32)
CO2 SERPL-SCNC: 24 MMOL/L (ref 21–32)
CO2 SERPL-SCNC: 25 MMOL/L (ref 21–32)
CO2 SERPL-SCNC: 26 MMOL/L (ref 21–32)
CO2 SERPL-SCNC: 27 MMOL/L (ref 21–32)
CO2 SERPL-SCNC: 28 MMOL/L (ref 21–32)
CO2 SERPL-SCNC: 28 MMOL/L (ref 21–32)
CO2 SERPL-SCNC: 29 MMOL/L (ref 21–32)
CO2 SERPL-SCNC: 30 MMOL/L (ref 21–32)
COLLECT TIME,HTIME: 1140
COLLECT TIME,HTIME: 419
COLOR UR: YELLOW
COLOR UR: YELLOW
COVID-19 RAPID TEST, COVR: NOT DETECTED
COVID-19 RAPID TEST, COVR: NOT DETECTED
CREAT SERPL-MCNC: 0.7 MG/DL (ref 0.8–1.5)
CREAT SERPL-MCNC: 0.73 MG/DL (ref 0.8–1.5)
CREAT SERPL-MCNC: 0.87 MG/DL (ref 0.8–1.5)
CREAT SERPL-MCNC: 0.9 MG/DL (ref 0.8–1.5)
CREAT SERPL-MCNC: 0.91 MG/DL (ref 0.8–1.5)
CREAT SERPL-MCNC: 0.96 MG/DL (ref 0.8–1.5)
CREAT SERPL-MCNC: 1.19 MG/DL (ref 0.8–1.5)
CREAT SERPL-MCNC: 1.2 MG/DL (ref 0.8–1.5)
CREAT SERPL-MCNC: 1.3 MG/DL (ref 0.8–1.5)
CREAT SERPL-MCNC: 1.62 MG/DL (ref 0.8–1.5)
CREAT SERPL-MCNC: 1.62 MG/DL (ref 0.8–1.5)
CREAT SERPL-MCNC: 1.63 MG/DL (ref 0.8–1.5)
CREAT SERPL-MCNC: 2.26 MG/DL (ref 0.8–1.5)
CREAT SERPL-MCNC: 2.63 MG/DL (ref 0.8–1.5)
CREAT SERPL-MCNC: 2.73 MG/DL (ref 0.8–1.5)
CREAT SERPL-MCNC: 2.9 MG/DL (ref 0.8–1.5)
CRYSTALS URNS QL MICRO: ABNORMAL /LPF
D DIMER PPP FEU-MCNC: 2.32 UG/ML(FEU)
DIAGNOSIS, 93000: NORMAL
DIFFERENTIAL METHOD BLD: ABNORMAL
EOSINOPHIL # BLD: 0 K/UL (ref 0–0.8)
EOSINOPHIL # BLD: 0.1 K/UL (ref 0–0.8)
EOSINOPHIL # BLD: 0.2 K/UL (ref 0–0.8)
EOSINOPHIL # BLD: 0.2 K/UL (ref 0–0.8)
EOSINOPHIL # BLD: 0.6 K/UL (ref 0–0.8)
EOSINOPHIL # BLD: 0.6 K/UL (ref 0–0.8)
EOSINOPHIL # BLD: 1.2 K/UL (ref 0–0.8)
EOSINOPHIL # BLD: 2 K/UL (ref 0–0.8)
EOSINOPHIL # BLD: 3 K/UL (ref 0–0.8)
EOSINOPHIL # BLD: 3.5 K/UL (ref 0–0.8)
EOSINOPHIL NFR BLD: 0 % (ref 0.5–7.8)
EOSINOPHIL NFR BLD: 1 % (ref 0.5–7.8)
EOSINOPHIL NFR BLD: 15 % (ref 0.5–7.8)
EOSINOPHIL NFR BLD: 17 % (ref 0.5–7.8)
EOSINOPHIL NFR BLD: 26 % (ref 0.5–7.8)
EOSINOPHIL NFR BLD: 5 % (ref 0.5–7.8)
EOSINOPHIL NFR BLD: 5 % (ref 0.5–7.8)
EOSINOPHIL NFR BLD: 8 % (ref 0.5–7.8)
EPI CELLS #/AREA URNS HPF: ABNORMAL /HPF
ERYTHROCYTE [DISTWIDTH] IN BLOOD BY AUTOMATED COUNT: 13.9 % (ref 11.9–14.6)
ERYTHROCYTE [DISTWIDTH] IN BLOOD BY AUTOMATED COUNT: 14.2 % (ref 11.9–14.6)
ERYTHROCYTE [DISTWIDTH] IN BLOOD BY AUTOMATED COUNT: 14.3 % (ref 11.9–14.6)
ERYTHROCYTE [DISTWIDTH] IN BLOOD BY AUTOMATED COUNT: 15.5 % (ref 11.9–14.6)
ERYTHROCYTE [DISTWIDTH] IN BLOOD BY AUTOMATED COUNT: 19 % (ref 11.9–14.6)
ERYTHROCYTE [DISTWIDTH] IN BLOOD BY AUTOMATED COUNT: 19.2 % (ref 11.9–14.6)
ERYTHROCYTE [DISTWIDTH] IN BLOOD BY AUTOMATED COUNT: 19.3 % (ref 11.9–14.6)
ERYTHROCYTE [DISTWIDTH] IN BLOOD BY AUTOMATED COUNT: 19.7 % (ref 11.9–14.6)
ERYTHROCYTE [DISTWIDTH] IN BLOOD BY AUTOMATED COUNT: 19.7 % (ref 11.9–14.6)
ERYTHROCYTE [DISTWIDTH] IN BLOOD BY AUTOMATED COUNT: 19.8 % (ref 11.9–14.6)
ERYTHROCYTE [DISTWIDTH] IN BLOOD BY AUTOMATED COUNT: 19.8 % (ref 11.9–14.6)
ERYTHROCYTE [DISTWIDTH] IN BLOOD BY AUTOMATED COUNT: 20.1 % (ref 11.9–14.6)
ERYTHROCYTE [DISTWIDTH] IN BLOOD BY AUTOMATED COUNT: 20.3 % (ref 11.9–14.6)
ERYTHROCYTE [DISTWIDTH] IN BLOOD BY AUTOMATED COUNT: 20.3 % (ref 11.9–14.6)
ERYTHROCYTE [DISTWIDTH] IN BLOOD BY AUTOMATED COUNT: 20.5 % (ref 11.9–14.6)
ERYTHROCYTE [DISTWIDTH] IN BLOOD BY AUTOMATED COUNT: 20.8 % (ref 11.9–14.6)
EST. AVERAGE GLUCOSE BLD GHB EST-MCNC: 151 MG/DL
EXHALED MINUTE VOLUME, VE: 6.99 L/MIN
FERRITIN SERPL-MCNC: 129 NG/ML (ref 8–388)
FERRITIN SERPL-MCNC: 336 NG/ML (ref 8–388)
FUNGUS CULTURE, RFCO2T: NEGATIVE
FUNGUS SMEAR, RFCO1T: NORMAL
FUNGUS SPEC CULT: NORMAL
FUNGUS STAIN, 188244: NORMAL
GAS FLOW.O2 O2 DELIVERY SYS: ABNORMAL L/MIN
GAS FLOW.O2 O2 DELIVERY SYS: ABNORMAL L/MIN
GAS FLOW.O2 SETTING OXYMISER: 16 BPM
GAS FLOW.O2 SETTING OXYMISER: 16 BPM
GLOBULIN SER CALC-MCNC: 3.6 G/DL (ref 2.3–3.5)
GLOBULIN SER CALC-MCNC: 3.6 G/DL (ref 2.3–3.5)
GLOBULIN SER CALC-MCNC: 4.5 G/DL (ref 2.3–3.5)
GLOBULIN SER CALC-MCNC: 4.5 G/DL (ref 2.3–3.5)
GLOBULIN SER CALC-MCNC: 4.7 G/DL (ref 2.3–3.5)
GLOBULIN SER CALC-MCNC: 4.8 G/DL (ref 2.3–3.5)
GLUCOSE BLD STRIP.AUTO-MCNC: 124 MG/DL (ref 65–100)
GLUCOSE BLD STRIP.AUTO-MCNC: 141 MG/DL (ref 65–100)
GLUCOSE BLD STRIP.AUTO-MCNC: 148 MG/DL (ref 65–100)
GLUCOSE BLD STRIP.AUTO-MCNC: 151 MG/DL (ref 65–100)
GLUCOSE BLD STRIP.AUTO-MCNC: 154 MG/DL (ref 65–100)
GLUCOSE BLD STRIP.AUTO-MCNC: 155 MG/DL (ref 65–100)
GLUCOSE BLD STRIP.AUTO-MCNC: 157 MG/DL (ref 65–100)
GLUCOSE BLD STRIP.AUTO-MCNC: 163 MG/DL (ref 65–100)
GLUCOSE BLD STRIP.AUTO-MCNC: 164 MG/DL (ref 65–100)
GLUCOSE BLD STRIP.AUTO-MCNC: 165 MG/DL (ref 65–100)
GLUCOSE BLD STRIP.AUTO-MCNC: 165 MG/DL (ref 65–100)
GLUCOSE BLD STRIP.AUTO-MCNC: 167 MG/DL (ref 65–100)
GLUCOSE BLD STRIP.AUTO-MCNC: 175 MG/DL (ref 65–100)
GLUCOSE BLD STRIP.AUTO-MCNC: 181 MG/DL (ref 65–100)
GLUCOSE BLD STRIP.AUTO-MCNC: 183 MG/DL (ref 65–100)
GLUCOSE BLD STRIP.AUTO-MCNC: 186 MG/DL (ref 65–100)
GLUCOSE BLD STRIP.AUTO-MCNC: 190 MG/DL (ref 65–100)
GLUCOSE BLD STRIP.AUTO-MCNC: 195 MG/DL (ref 65–100)
GLUCOSE BLD STRIP.AUTO-MCNC: 201 MG/DL (ref 65–100)
GLUCOSE BLD STRIP.AUTO-MCNC: 207 MG/DL (ref 65–100)
GLUCOSE BLD STRIP.AUTO-MCNC: 210 MG/DL (ref 65–100)
GLUCOSE BLD STRIP.AUTO-MCNC: 212 MG/DL (ref 65–100)
GLUCOSE BLD STRIP.AUTO-MCNC: 215 MG/DL (ref 65–100)
GLUCOSE BLD STRIP.AUTO-MCNC: 220 MG/DL (ref 65–100)
GLUCOSE BLD STRIP.AUTO-MCNC: 228 MG/DL (ref 65–100)
GLUCOSE BLD STRIP.AUTO-MCNC: 240 MG/DL (ref 65–100)
GLUCOSE BLD STRIP.AUTO-MCNC: 274 MG/DL (ref 65–100)
GLUCOSE BLD STRIP.AUTO-MCNC: 276 MG/DL (ref 65–100)
GLUCOSE BLD STRIP.AUTO-MCNC: 278 MG/DL (ref 65–100)
GLUCOSE BLD STRIP.AUTO-MCNC: 284 MG/DL (ref 65–100)
GLUCOSE BLD STRIP.AUTO-MCNC: 292 MG/DL (ref 65–100)
GLUCOSE BLD STRIP.AUTO-MCNC: 355 MG/DL (ref 65–100)
GLUCOSE BLD STRIP.AUTO-MCNC: 361 MG/DL (ref 65–100)
GLUCOSE BLD STRIP.AUTO-MCNC: 415 MG/DL (ref 65–100)
GLUCOSE SERPL-MCNC: 102 MG/DL (ref 65–100)
GLUCOSE SERPL-MCNC: 112 MG/DL (ref 65–100)
GLUCOSE SERPL-MCNC: 125 MG/DL (ref 65–100)
GLUCOSE SERPL-MCNC: 126 MG/DL (ref 65–100)
GLUCOSE SERPL-MCNC: 134 MG/DL (ref 65–100)
GLUCOSE SERPL-MCNC: 147 MG/DL (ref 65–100)
GLUCOSE SERPL-MCNC: 150 MG/DL (ref 65–100)
GLUCOSE SERPL-MCNC: 154 MG/DL (ref 65–100)
GLUCOSE SERPL-MCNC: 154 MG/DL (ref 65–100)
GLUCOSE SERPL-MCNC: 164 MG/DL (ref 65–100)
GLUCOSE SERPL-MCNC: 169 MG/DL (ref 65–100)
GLUCOSE SERPL-MCNC: 184 MG/DL (ref 65–100)
GLUCOSE SERPL-MCNC: 191 MG/DL (ref 65–100)
GLUCOSE SERPL-MCNC: 218 MG/DL (ref 65–100)
GLUCOSE SERPL-MCNC: 237 MG/DL (ref 65–100)
GLUCOSE SERPL-MCNC: 242 MG/DL (ref 65–100)
GLUCOSE SERPL-MCNC: 284 MG/DL (ref 65–100)
GLUCOSE SERPL-MCNC: 339 MG/DL (ref 65–100)
GLUCOSE UR STRIP.AUTO-MCNC: 250 MG/DL
GLUCOSE UR STRIP.AUTO-MCNC: NEGATIVE MG/DL
GRAM STN SPEC: ABNORMAL
GRAM STN SPEC: NORMAL
HBA1C MFR BLD: 6.9 % (ref 4.2–6.3)
HCO3 BLD-SCNC: 22.7 MMOL/L (ref 22–26)
HCO3 BLD-SCNC: 23.2 MMOL/L (ref 22–26)
HCT VFR BLD AUTO: 27.4 % (ref 41.1–50.3)
HCT VFR BLD AUTO: 28.7 % (ref 41.1–50.3)
HCT VFR BLD AUTO: 28.9 % (ref 41.1–50.3)
HCT VFR BLD AUTO: 31.1 % (ref 41.1–50.3)
HCT VFR BLD AUTO: 35.4 % (ref 41.1–50.3)
HCT VFR BLD AUTO: 36.8 % (ref 41.1–50.3)
HCT VFR BLD AUTO: 38.7 % (ref 41.1–50.3)
HCT VFR BLD AUTO: 39 % (ref 41.1–50.3)
HCT VFR BLD AUTO: 40.6 % (ref 41.1–50.3)
HCT VFR BLD AUTO: 40.6 % (ref 41.1–50.3)
HCT VFR BLD AUTO: 41.5 % (ref 41.1–50.3)
HCT VFR BLD AUTO: 41.5 % (ref 41.1–50.3)
HCT VFR BLD AUTO: 41.8 % (ref 41.1–50.3)
HCT VFR BLD AUTO: 41.9 % (ref 41.1–50.3)
HCT VFR BLD AUTO: 42.9 % (ref 41.1–50.3)
HCT VFR BLD AUTO: 43.5 % (ref 41.1–50.3)
HDLC SERPL-MCNC: 74 MG/DL (ref 40–60)
HDLC SERPL: 2.2 {RATIO}
HEMOCCULT STL QL: POSITIVE
HGB BLD-MCNC: 10.9 G/DL (ref 13.6–17.2)
HGB BLD-MCNC: 12.1 G/DL (ref 13.6–17.2)
HGB BLD-MCNC: 12.1 G/DL (ref 13.6–17.2)
HGB BLD-MCNC: 12.9 G/DL (ref 13.6–17.2)
HGB BLD-MCNC: 13.1 G/DL (ref 13.6–17.2)
HGB BLD-MCNC: 13.2 G/DL (ref 13.6–17.2)
HGB BLD-MCNC: 13.2 G/DL (ref 13.6–17.2)
HGB BLD-MCNC: 13.4 G/DL (ref 13.6–17.2)
HGB BLD-MCNC: 13.8 G/DL (ref 13.6–17.2)
HGB BLD-MCNC: 14.6 G/DL (ref 13.6–17.2)
HGB BLD-MCNC: 8.9 G/DL (ref 13.6–17.2)
HGB BLD-MCNC: 9.2 G/DL (ref 13.6–17.2)
HGB BLD-MCNC: 9.4 G/DL (ref 13.6–17.2)
HGB BLD-MCNC: 9.7 G/DL (ref 13.6–17.2)
HGB RETIC QN AUTO: 32 PG (ref 29–35)
HGB RETIC QN AUTO: 36 PG (ref 29–35)
HGB UR QL STRIP: ABNORMAL
HGB UR QL STRIP: NEGATIVE
IMM GRANULOCYTES # BLD AUTO: 0.1 K/UL (ref 0–0.5)
IMM GRANULOCYTES # BLD AUTO: 0.1 K/UL (ref 0–0.5)
IMM GRANULOCYTES # BLD AUTO: 0.2 K/UL (ref 0–0.5)
IMM GRANULOCYTES # BLD AUTO: 0.3 K/UL (ref 0–0.5)
IMM GRANULOCYTES # BLD AUTO: 0.4 K/UL (ref 0–0.5)
IMM GRANULOCYTES # BLD AUTO: 0.8 K/UL (ref 0–0.5)
IMM GRANULOCYTES # BLD AUTO: 0.9 K/UL (ref 0–0.5)
IMM GRANULOCYTES # BLD AUTO: 0.9 K/UL (ref 0–0.5)
IMM GRANULOCYTES # BLD AUTO: 1 K/UL (ref 0–0.5)
IMM GRANULOCYTES NFR BLD AUTO: 1 % (ref 0–5)
IMM GRANULOCYTES NFR BLD AUTO: 2 % (ref 0–5)
IMM GRANULOCYTES NFR BLD AUTO: 3 % (ref 0–5)
IMM GRANULOCYTES NFR BLD AUTO: 4 % (ref 0–5)
IMM RETICS NFR: 4.7 % (ref 2.3–13.4)
IMM RETICS NFR: 8.9 % (ref 2.3–13.4)
INR PPP: 1.1
INSPIRATION.DURATION SETTING TIME VENT: 1 SEC
INTERPRETATION: ABNORMAL
IRON SATN MFR SERPL: 45 %
IRON SATN MFR SERPL: 9 %
IRON SERPL-MCNC: 107 UG/DL (ref 35–150)
IRON SERPL-MCNC: 24 UG/DL (ref 35–150)
KETONES UR QL STRIP.AUTO: NEGATIVE MG/DL
KETONES UR QL STRIP.AUTO: NEGATIVE MG/DL
LACTATE SERPL-SCNC: 1.3 MMOL/L (ref 0.4–2)
LACTATE SERPL-SCNC: 1.3 MMOL/L (ref 0.4–2)
LACTATE SERPL-SCNC: 1.7 MMOL/L (ref 0.4–2)
LDLC SERPL CALC-MCNC: 37.6 MG/DL
LEUKOCYTE ESTERASE UR QL STRIP.AUTO: ABNORMAL
LEUKOCYTE ESTERASE UR QL STRIP.AUTO: NEGATIVE
LIPID PROFILE,FLP: ABNORMAL
LYMPHOCYTES # BLD: 0.2 K/UL (ref 0.5–4.6)
LYMPHOCYTES # BLD: 0.2 K/UL (ref 0.5–4.6)
LYMPHOCYTES # BLD: 0.3 K/UL (ref 0.5–4.6)
LYMPHOCYTES # BLD: 0.4 K/UL (ref 0.5–4.6)
LYMPHOCYTES # BLD: 1 K/UL (ref 0.5–4.6)
LYMPHOCYTES # BLD: 1 K/UL (ref 0.5–4.6)
LYMPHOCYTES # BLD: 1.2 K/UL (ref 0.5–4.6)
LYMPHOCYTES # BLD: 1.6 K/UL (ref 0.5–4.6)
LYMPHOCYTES NFR BLD: 1 % (ref 13–44)
LYMPHOCYTES NFR BLD: 2 % (ref 13–44)
LYMPHOCYTES NFR BLD: 6 % (ref 13–44)
LYMPHOCYTES NFR BLD: 8 % (ref 13–44)
LYMPHOCYTES NFR BLD: 8 % (ref 13–44)
LYMPHOCYTES NFR BLD: 9 % (ref 13–44)
MAGNESIUM SERPL-MCNC: 2.1 MG/DL (ref 1.8–2.4)
MAGNESIUM SERPL-MCNC: 2.2 MG/DL (ref 1.8–2.4)
MAGNESIUM SERPL-MCNC: 2.3 MG/DL (ref 1.8–2.4)
MAGNESIUM SERPL-MCNC: 2.4 MG/DL (ref 1.8–2.4)
MAGNESIUM SERPL-MCNC: 2.5 MG/DL (ref 1.8–2.4)
MAGNESIUM SERPL-MCNC: 2.6 MG/DL (ref 1.8–2.4)
MCH RBC QN AUTO: 28.1 PG (ref 26.1–32.9)
MCH RBC QN AUTO: 28.4 PG (ref 26.1–32.9)
MCH RBC QN AUTO: 28.4 PG (ref 26.1–32.9)
MCH RBC QN AUTO: 28.6 PG (ref 26.1–32.9)
MCH RBC QN AUTO: 29.1 PG (ref 26.1–32.9)
MCH RBC QN AUTO: 29.2 PG (ref 26.1–32.9)
MCH RBC QN AUTO: 29.3 PG (ref 26.1–32.9)
MCH RBC QN AUTO: 29.4 PG (ref 26.1–32.9)
MCH RBC QN AUTO: 29.5 PG (ref 26.1–32.9)
MCH RBC QN AUTO: 29.6 PG (ref 26.1–32.9)
MCH RBC QN AUTO: 29.6 PG (ref 26.1–32.9)
MCH RBC QN AUTO: 29.7 PG (ref 26.1–32.9)
MCH RBC QN AUTO: 29.8 PG (ref 26.1–32.9)
MCH RBC QN AUTO: 29.9 PG (ref 26.1–32.9)
MCHC RBC AUTO-ENTMCNC: 30.8 G/DL (ref 31.4–35)
MCHC RBC AUTO-ENTMCNC: 31.2 G/DL (ref 31.4–35)
MCHC RBC AUTO-ENTMCNC: 31.3 G/DL (ref 31.4–35)
MCHC RBC AUTO-ENTMCNC: 31.5 G/DL (ref 31.4–35)
MCHC RBC AUTO-ENTMCNC: 31.6 G/DL (ref 31.4–35)
MCHC RBC AUTO-ENTMCNC: 31.8 G/DL (ref 31.4–35)
MCHC RBC AUTO-ENTMCNC: 32.2 G/DL (ref 31.4–35)
MCHC RBC AUTO-ENTMCNC: 32.5 G/DL (ref 31.4–35)
MCHC RBC AUTO-ENTMCNC: 32.5 G/DL (ref 31.4–35)
MCHC RBC AUTO-ENTMCNC: 32.8 G/DL (ref 31.4–35)
MCHC RBC AUTO-ENTMCNC: 32.9 G/DL (ref 31.4–35)
MCHC RBC AUTO-ENTMCNC: 33 G/DL (ref 31.4–35)
MCHC RBC AUTO-ENTMCNC: 33 G/DL (ref 31.4–35)
MCHC RBC AUTO-ENTMCNC: 33.1 G/DL (ref 31.4–35)
MCHC RBC AUTO-ENTMCNC: 33.3 G/DL (ref 31.4–35)
MCHC RBC AUTO-ENTMCNC: 33.6 G/DL (ref 31.4–35)
MCV RBC AUTO: 87.5 FL (ref 79.6–97.8)
MCV RBC AUTO: 87.9 FL (ref 79.6–97.8)
MCV RBC AUTO: 88.7 FL (ref 79.6–97.8)
MCV RBC AUTO: 88.8 FL (ref 79.6–97.8)
MCV RBC AUTO: 89.1 FL (ref 79.6–97.8)
MCV RBC AUTO: 89.4 FL (ref 79.6–97.8)
MCV RBC AUTO: 89.8 FL (ref 79.6–97.8)
MCV RBC AUTO: 89.8 FL (ref 79.6–97.8)
MCV RBC AUTO: 90 FL (ref 79.6–97.8)
MCV RBC AUTO: 90.1 FL (ref 79.6–97.8)
MCV RBC AUTO: 90.3 FL (ref 79.6–97.8)
MCV RBC AUTO: 91.4 FL (ref 79.6–97.8)
MCV RBC AUTO: 91.6 FL (ref 79.6–97.8)
MCV RBC AUTO: 91.7 FL (ref 79.6–97.8)
MCV RBC AUTO: 95.1 FL (ref 79.6–97.8)
MCV RBC AUTO: 95.4 FL (ref 79.6–97.8)
MECA (METHICILLIN-RESISTANCE GENES), MRGP: DETECTED
MM INDURATION POC: 0 MM (ref 0–5)
MM INDURATION POC: 0 MM (ref 0–5)
MONOCYTES # BLD: 0.2 K/UL (ref 0.1–1.3)
MONOCYTES # BLD: 0.3 K/UL (ref 0.1–1.3)
MONOCYTES # BLD: 0.3 K/UL (ref 0.1–1.3)
MONOCYTES # BLD: 0.4 K/UL (ref 0.1–1.3)
MONOCYTES # BLD: 0.5 K/UL (ref 0.1–1.3)
MONOCYTES # BLD: 0.7 K/UL (ref 0.1–1.3)
MONOCYTES # BLD: 0.7 K/UL (ref 0.1–1.3)
MONOCYTES # BLD: 1 K/UL (ref 0.1–1.3)
MONOCYTES # BLD: 1.2 K/UL (ref 0.1–1.3)
MONOCYTES # BLD: 1.3 K/UL (ref 0.1–1.3)
MONOCYTES # BLD: 1.6 K/UL (ref 0.1–1.3)
MONOCYTES # BLD: 2.9 K/UL (ref 0.1–1.3)
MONOCYTES NFR BLD: 1 % (ref 4–12)
MONOCYTES NFR BLD: 11 % (ref 4–12)
MONOCYTES NFR BLD: 2 % (ref 4–12)
MONOCYTES NFR BLD: 3 % (ref 4–12)
MONOCYTES NFR BLD: 4 % (ref 4–12)
MONOCYTES NFR BLD: 5 % (ref 4–12)
MONOCYTES NFR BLD: 8 % (ref 4–12)
MONOCYTES NFR BLD: 9 % (ref 4–12)
MUCOUS THREADS URNS QL MICRO: ABNORMAL /LPF
MYCOBACTERIUM SPEC QL CULT: NEGATIVE
NEUTS SEG # BLD: 10.8 K/UL (ref 1.7–8.2)
NEUTS SEG # BLD: 10.9 K/UL (ref 1.7–8.2)
NEUTS SEG # BLD: 11 K/UL (ref 1.7–8.2)
NEUTS SEG # BLD: 11.5 K/UL (ref 1.7–8.2)
NEUTS SEG # BLD: 11.8 K/UL (ref 1.7–8.2)
NEUTS SEG # BLD: 11.8 K/UL (ref 1.7–8.2)
NEUTS SEG # BLD: 14 K/UL (ref 1.7–8.2)
NEUTS SEG # BLD: 15 K/UL (ref 1.7–8.2)
NEUTS SEG # BLD: 20.3 K/UL (ref 1.7–8.2)
NEUTS SEG # BLD: 24 K/UL (ref 1.7–8.2)
NEUTS SEG # BLD: 24.5 K/UL (ref 1.7–8.2)
NEUTS SEG # BLD: 25 K/UL (ref 1.7–8.2)
NEUTS SEG # BLD: 28.1 K/UL (ref 1.7–8.2)
NEUTS SEG # BLD: 7.7 K/UL (ref 1.7–8.2)
NEUTS SEG # BLD: 7.9 K/UL (ref 1.7–8.2)
NEUTS SEG NFR BLD: 58 % (ref 43–78)
NEUTS SEG NFR BLD: 64 % (ref 43–78)
NEUTS SEG NFR BLD: 67 % (ref 43–78)
NEUTS SEG NFR BLD: 75 % (ref 43–78)
NEUTS SEG NFR BLD: 86 % (ref 43–78)
NEUTS SEG NFR BLD: 87 % (ref 43–78)
NEUTS SEG NFR BLD: 88 % (ref 43–78)
NEUTS SEG NFR BLD: 89 % (ref 43–78)
NEUTS SEG NFR BLD: 92 % (ref 43–78)
NEUTS SEG NFR BLD: 92 % (ref 43–78)
NEUTS SEG NFR BLD: 93 % (ref 43–78)
NEUTS SEG NFR BLD: 94 % (ref 43–78)
NITRITE UR QL STRIP.AUTO: NEGATIVE
NITRITE UR QL STRIP.AUTO: NEGATIVE
NRBC # BLD: 0 K/UL (ref 0–0.2)
NRBC # BLD: 0.07 K/UL (ref 0–0.2)
NRBC # BLD: 0.08 K/UL (ref 0–0.2)
NRBC # BLD: 0.09 K/UL (ref 0–0.2)
O2/TOTAL GAS SETTING VFR VENT: 40 %
O2/TOTAL GAS SETTING VFR VENT: 60 %
P-R INTERVAL, ECG05: 328 MS
PATH REV BLD -IMP: NORMAL
PCO2 BLD: 37.2 MMHG (ref 35–45)
PCO2 BLD: 37.3 MMHG (ref 35–45)
PEEP RESPIRATORY: 8 CMH2O
PEEP RESPIRATORY: 8 CMH2O
PH BLD: 7.39 [PH] (ref 7.35–7.45)
PH BLD: 7.4 [PH] (ref 7.35–7.45)
PH UR STRIP: 5 [PH] (ref 5–9)
PH UR STRIP: 6.5 [PH] (ref 5–9)
PHOSPHATE SERPL-MCNC: 4.4 MG/DL (ref 2.3–3.7)
PLATELET # BLD AUTO: 111 K/UL (ref 150–450)
PLATELET # BLD AUTO: 117 K/UL (ref 150–450)
PLATELET # BLD AUTO: 142 K/UL (ref 150–450)
PLATELET # BLD AUTO: 143 K/UL (ref 150–450)
PLATELET # BLD AUTO: 154 K/UL (ref 150–450)
PLATELET # BLD AUTO: 170 K/UL (ref 150–450)
PLATELET # BLD AUTO: 203 K/UL (ref 150–450)
PLATELET # BLD AUTO: 239 K/UL (ref 150–450)
PLATELET # BLD AUTO: 250 K/UL (ref 150–450)
PLATELET # BLD AUTO: 322 K/UL (ref 150–450)
PLATELET # BLD AUTO: 340 K/UL (ref 150–450)
PLATELET # BLD AUTO: 415 K/UL (ref 150–450)
PLATELET # BLD AUTO: 71 K/UL (ref 150–450)
PLATELET # BLD AUTO: 80 K/UL (ref 150–450)
PLATELET # BLD AUTO: 80 K/UL (ref 150–450)
PLATELET # BLD AUTO: 82 K/UL (ref 150–450)
PLATELET COMMENTS,PCOM: ADEQUATE
PMV BLD AUTO: 10 FL (ref 9.4–12.3)
PMV BLD AUTO: 10.2 FL (ref 9.4–12.3)
PMV BLD AUTO: 10.6 FL (ref 9.4–12.3)
PMV BLD AUTO: 10.7 FL (ref 9.4–12.3)
PMV BLD AUTO: 11 FL (ref 9.4–12.3)
PMV BLD AUTO: 11.1 FL (ref 9.4–12.3)
PMV BLD AUTO: 11.2 FL (ref 9.4–12.3)
PMV BLD AUTO: 11.3 FL (ref 9.4–12.3)
PMV BLD AUTO: 11.3 FL (ref 9.4–12.3)
PMV BLD AUTO: 11.8 FL (ref 9.4–12.3)
PMV BLD AUTO: 12 FL (ref 9.4–12.3)
PMV BLD AUTO: 8.9 FL (ref 9.4–12.3)
PMV BLD AUTO: 9 FL (ref 9.4–12.3)
PMV BLD AUTO: 9 FL (ref 9.4–12.3)
PMV BLD AUTO: 9.2 FL (ref 9.4–12.3)
PMV BLD AUTO: 9.5 FL (ref 9.4–12.3)
PO2 BLD: 112 MMHG (ref 75–100)
PO2 BLD: 143 MMHG (ref 75–100)
POTASSIUM SERPL-SCNC: 3.3 MMOL/L (ref 3.5–5.1)
POTASSIUM SERPL-SCNC: 3.5 MMOL/L (ref 3.5–5.1)
POTASSIUM SERPL-SCNC: 4 MMOL/L (ref 3.5–5.1)
POTASSIUM SERPL-SCNC: 4 MMOL/L (ref 3.5–5.1)
POTASSIUM SERPL-SCNC: 4.1 MMOL/L (ref 3.5–5.1)
POTASSIUM SERPL-SCNC: 4.2 MMOL/L (ref 3.5–5.1)
POTASSIUM SERPL-SCNC: 4.3 MMOL/L (ref 3.5–5.1)
POTASSIUM SERPL-SCNC: 4.3 MMOL/L (ref 3.5–5.1)
POTASSIUM SERPL-SCNC: 4.4 MMOL/L (ref 3.5–5.1)
POTASSIUM SERPL-SCNC: 4.5 MMOL/L (ref 3.5–5.1)
POTASSIUM SERPL-SCNC: 4.6 MMOL/L (ref 3.5–5.1)
POTASSIUM SERPL-SCNC: 4.7 MMOL/L (ref 3.5–5.1)
POTASSIUM SERPL-SCNC: 4.7 MMOL/L (ref 3.5–5.1)
POTASSIUM SERPL-SCNC: 5 MMOL/L (ref 3.5–5.1)
POTASSIUM SERPL-SCNC: 5 MMOL/L (ref 3.5–5.1)
PPD POC: NEGATIVE NEGATIVE
PPD POC: NEGATIVE NEGATIVE
PROCALCITONIN SERPL-MCNC: 0.18 NG/ML
PROCALCITONIN SERPL-MCNC: 0.49 NG/ML
PROT SERPL-MCNC: 5.6 G/DL (ref 6.3–8.2)
PROT SERPL-MCNC: 6.4 G/DL (ref 6.3–8.2)
PROT SERPL-MCNC: 6.9 G/DL (ref 6.3–8.2)
PROT SERPL-MCNC: 7 G/DL (ref 6.3–8.2)
PROT SERPL-MCNC: 7.2 G/DL (ref 6.3–8.2)
PROT SERPL-MCNC: 7.5 G/DL (ref 6.3–8.2)
PROT UR STRIP-MCNC: ABNORMAL MG/DL
PROT UR STRIP-MCNC: NEGATIVE MG/DL
PROTHROMBIN TIME: 14 SEC (ref 12.5–14.7)
Q-T INTERVAL, ECG07: 404 MS
Q-T INTERVAL, ECG07: 422 MS
Q-T INTERVAL, ECG07: 424 MS
QRS DURATION, ECG06: 104 MS
QRS DURATION, ECG06: 104 MS
QRS DURATION, ECG06: 108 MS
QTC CALCULATION (BEZET), ECG08: 430 MS
QTC CALCULATION (BEZET), ECG08: 523 MS
QTC CALCULATION (BEZET), ECG08: 541 MS
RBC # BLD AUTO: 2.99 M/UL (ref 4.23–5.6)
RBC # BLD AUTO: 3.14 M/UL (ref 4.23–5.6)
RBC # BLD AUTO: 3.15 M/UL (ref 4.23–5.6)
RBC # BLD AUTO: 3.27 M/UL (ref 4.23–5.6)
RBC # BLD AUTO: 3.71 M/UL (ref 4.23–5.6)
RBC # BLD AUTO: 4.09 M/UL (ref 4.23–5.6)
RBC # BLD AUTO: 4.31 M/UL (ref 4.23–5.67)
RBC # BLD AUTO: 4.36 M/UL (ref 4.23–5.6)
RBC # BLD AUTO: 4.57 M/UL (ref 4.23–5.6)
RBC # BLD AUTO: 4.62 M/UL (ref 4.23–5.67)
RBC # BLD AUTO: 4.62 M/UL (ref 4.23–5.67)
RBC # BLD AUTO: 4.65 M/UL (ref 4.23–5.67)
RBC # BLD AUTO: 4.68 M/UL (ref 4.23–5.6)
RBC # BLD AUTO: 4.69 M/UL (ref 4.23–5.6)
RBC # BLD AUTO: 4.75 M/UL (ref 4.23–5.67)
RBC # BLD AUTO: 4.97 M/UL (ref 4.23–5.6)
RBC #/AREA URNS HPF: ABNORMAL /HPF
RBC MORPH BLD: ABNORMAL
RBC MORPH BLD: ABNORMAL
REFLEX TO ID, RFCO3T: NORMAL
RETICS # AUTO: 0.01 M/UL (ref 0.03–0.1)
RETICS # AUTO: 0.08 M/UL (ref 0.03–0.1)
RETICS/RBC NFR AUTO: 0.3 % (ref 0.3–2)
RETICS/RBC NFR AUTO: 1.7 % (ref 0.3–2)
SAO2 % BLD: 98 % (ref 95–98)
SAO2 % BLD: 99 % (ref 95–98)
SARS COV-2, XPGCVT: NEGATIVE
SARS-COV-2, COV2: NORMAL
SERVICE CMNT-IMP: ABNORMAL
SERVICE CMNT-IMP: NORMAL
SODIUM SERPL-SCNC: 136 MMOL/L (ref 136–145)
SODIUM SERPL-SCNC: 138 MMOL/L (ref 136–145)
SODIUM SERPL-SCNC: 139 MMOL/L (ref 136–145)
SODIUM SERPL-SCNC: 139 MMOL/L (ref 136–145)
SODIUM SERPL-SCNC: 139 MMOL/L (ref 138–145)
SODIUM SERPL-SCNC: 140 MMOL/L (ref 136–145)
SODIUM SERPL-SCNC: 141 MMOL/L (ref 138–145)
SODIUM SERPL-SCNC: 145 MMOL/L (ref 136–145)
SODIUM SERPL-SCNC: 145 MMOL/L (ref 136–145)
SODIUM SERPL-SCNC: 145 MMOL/L (ref 138–145)
SODIUM SERPL-SCNC: 146 MMOL/L (ref 136–145)
SODIUM SERPL-SCNC: 147 MMOL/L (ref 136–145)
SODIUM SERPL-SCNC: 149 MMOL/L (ref 136–145)
SODIUM SERPL-SCNC: 150 MMOL/L (ref 136–145)
SODIUM SERPL-SCNC: 151 MMOL/L (ref 136–145)
SODIUM SERPL-SCNC: 152 MMOL/L (ref 136–145)
SODIUM SERPL-SCNC: 153 MMOL/L (ref 136–145)
SODIUM SERPL-SCNC: 154 MMOL/L (ref 136–145)
SODIUM SERPL-SCNC: 155 MMOL/L (ref 136–145)
SODIUM SERPL-SCNC: 157 MMOL/L (ref 136–145)
SODIUM SERPL-SCNC: 159 MMOL/L (ref 136–145)
SOURCE, COVRS: NORMAL
SOURCE, COVRS: NORMAL
SP GR UR REFRACTOMETRY: 1.01 (ref 1–1.02)
SP GR UR REFRACTOMETRY: 1.02 (ref 1–1.02)
SPECIMEN PREPARATION: NORMAL
SPECIMEN SOURCE: NORMAL
SPECIMEN TYPE: ABNORMAL
SPECIMEN TYPE: ABNORMAL
STAPHYLOCOCCUS, STAPP: DETECTED
T3 SERPL-MCNC: 0.61 NG/ML (ref 0.6–1.81)
T4 FREE SERPL-MCNC: 1 NG/DL (ref 0.78–1.4)
T4 FREE SERPL-MCNC: 1.3 NG/DL (ref 0.9–1.8)
TIBC SERPL-MCNC: 236 UG/DL (ref 250–450)
TIBC SERPL-MCNC: 261 UG/DL (ref 250–450)
TRIGL SERPL-MCNC: 247 MG/DL (ref 35–150)
TROPONIN-HIGH SENSITIVITY: 129.6 PG/ML (ref 0–14)
TROPONIN-HIGH SENSITIVITY: 162.6 PG/ML (ref 0–14)
TROPONIN-HIGH SENSITIVITY: 163.3 PG/ML (ref 0–14)
TROPONIN-HIGH SENSITIVITY: 81.1 PG/ML (ref 0–14)
TROPONIN-HIGH SENSITIVITY: 84.1 PG/ML (ref 0–14)
TSH SERPL DL<=0.005 MIU/L-ACNC: 0.06 UIU/ML (ref 0.36–3.74)
TSH SERPL DL<=0.005 MIU/L-ACNC: 0.32 UIU/ML (ref 0.36–3)
TSH SERPL DL<=0.005 MIU/L-ACNC: 0.89 UIU/ML (ref 0.36–3)
UROBILINOGEN UR QL STRIP.AUTO: 0.2 EU/DL (ref 0.2–1)
UROBILINOGEN UR QL STRIP.AUTO: 0.2 EU/DL (ref 0.2–1)
VANCOMYCIN SERPL-MCNC: 20.3 UG/ML
VENTILATION MODE VENT: ABNORMAL
VENTILATION MODE VENT: ABNORMAL
VENTRICULAR RATE, ECG03: 101 BPM
VENTRICULAR RATE, ECG03: 62 BPM
VENTRICULAR RATE, ECG03: 99 BPM
VIT B12 SERPL-MCNC: >2000 PG/ML (ref 193–986)
VLDLC SERPL CALC-MCNC: 49.4 MG/DL (ref 6–23)
VT SETTING VENT: 450 ML
VT SETTING VENT: 450 ML
WBC # BLD AUTO: 11.8 K/UL (ref 4.3–11.1)
WBC # BLD AUTO: 12.1 K/UL (ref 4.3–11.1)
WBC # BLD AUTO: 12.3 K/UL (ref 4.3–11.1)
WBC # BLD AUTO: 12.6 K/UL (ref 4.3–11.1)
WBC # BLD AUTO: 12.8 K/UL (ref 4.3–11.1)
WBC # BLD AUTO: 13.1 K/UL (ref 4.3–11.1)
WBC # BLD AUTO: 13.6 K/UL (ref 4.3–11.1)
WBC # BLD AUTO: 15.6 K/UL (ref 4.3–11.1)
WBC # BLD AUTO: 16.2 K/UL (ref 4.3–11.1)
WBC # BLD AUTO: 20.6 K/UL (ref 4.3–11.1)
WBC # BLD AUTO: 21.5 K/UL (ref 4.3–11.1)
WBC # BLD AUTO: 26.1 K/UL (ref 4.3–11.1)
WBC # BLD AUTO: 26.7 K/UL (ref 4.3–11.1)
WBC # BLD AUTO: 26.9 K/UL (ref 4.3–11.1)
WBC # BLD AUTO: 27.2 K/UL (ref 4.3–11.1)
WBC # BLD AUTO: 32.3 K/UL (ref 4.3–11.1)
WBC MORPH BLD: ABNORMAL
WBC URNS QL MICRO: >100 /HPF

## 2021-01-01 PROCEDURE — 85025 COMPLETE CBC W/AUTO DIFF WBC: CPT

## 2021-01-01 PROCEDURE — 88360 TUMOR IMMUNOHISTOCHEM/MANUAL: CPT

## 2021-01-01 PROCEDURE — 84100 ASSAY OF PHOSPHORUS: CPT

## 2021-01-01 PROCEDURE — 74011250637 HC RX REV CODE- 250/637: Performed by: NURSE PRACTITIONER

## 2021-01-01 PROCEDURE — 88342 IMHCHEM/IMCYTCHM 1ST ANTB: CPT

## 2021-01-01 PROCEDURE — 2709999900 HC NON-CHARGEABLE SUPPLY

## 2021-01-01 PROCEDURE — 87040 BLOOD CULTURE FOR BACTERIA: CPT

## 2021-01-01 PROCEDURE — G0299 HHS/HOSPICE OF RN EA 15 MIN: HCPCS

## 2021-01-01 PROCEDURE — 74011250636 HC RX REV CODE- 250/636: Performed by: FAMILY MEDICINE

## 2021-01-01 PROCEDURE — 74011000258 HC RX REV CODE- 258: Performed by: INTERNAL MEDICINE

## 2021-01-01 PROCEDURE — 36415 COLL VENOUS BLD VENIPUNCTURE: CPT

## 2021-01-01 PROCEDURE — 97162 PT EVAL MOD COMPLEX 30 MIN: CPT

## 2021-01-01 PROCEDURE — 74011000250 HC RX REV CODE- 250: Performed by: INTERNAL MEDICINE

## 2021-01-01 PROCEDURE — 74011000250 HC RX REV CODE- 250: Performed by: NURSE PRACTITIONER

## 2021-01-01 PROCEDURE — 0656 HSPC GENERAL INPATIENT

## 2021-01-01 PROCEDURE — 84295 ASSAY OF SERUM SODIUM: CPT

## 2021-01-01 PROCEDURE — 65610000001 HC ROOM ICU GENERAL

## 2021-01-01 PROCEDURE — 71250 CT THORAX DX C-: CPT

## 2021-01-01 PROCEDURE — 92611 MOTION FLUOROSCOPY/SWALLOW: CPT

## 2021-01-01 PROCEDURE — 74011250636 HC RX REV CODE- 250/636: Performed by: NURSE PRACTITIONER

## 2021-01-01 PROCEDURE — 87205 SMEAR GRAM STAIN: CPT

## 2021-01-01 PROCEDURE — 96367 TX/PROPH/DG ADDL SEQ IV INF: CPT

## 2021-01-01 PROCEDURE — 83735 ASSAY OF MAGNESIUM: CPT

## 2021-01-01 PROCEDURE — 74011250636 HC RX REV CODE- 250/636: Performed by: INTERNAL MEDICINE

## 2021-01-01 PROCEDURE — 65270000029 HC RM PRIVATE

## 2021-01-01 PROCEDURE — 77030031131 HC SUT MXN P COVD -B

## 2021-01-01 PROCEDURE — 87150 DNA/RNA AMPLIFIED PROBE: CPT

## 2021-01-01 PROCEDURE — 99285 EMERGENCY DEPT VISIT HI MDM: CPT

## 2021-01-01 PROCEDURE — C1894 INTRO/SHEATH, NON-LASER: HCPCS

## 2021-01-01 PROCEDURE — 99232 SBSQ HOSP IP/OBS MODERATE 35: CPT | Performed by: INTERNAL MEDICINE

## 2021-01-01 PROCEDURE — 70553 MRI BRAIN STEM W/O & W/DYE: CPT

## 2021-01-01 PROCEDURE — 80048 BASIC METABOLIC PNL TOTAL CA: CPT

## 2021-01-01 PROCEDURE — 77417 THER RADIOLOGY PORT IMAGE(S): CPT

## 2021-01-01 PROCEDURE — 99222 1ST HOSP IP/OBS MODERATE 55: CPT | Performed by: INTERNAL MEDICINE

## 2021-01-01 PROCEDURE — 82962 GLUCOSE BLOOD TEST: CPT

## 2021-01-01 PROCEDURE — 92610 EVALUATE SWALLOWING FUNCTION: CPT

## 2021-01-01 PROCEDURE — 84439 ASSAY OF FREE THYROXINE: CPT

## 2021-01-01 PROCEDURE — 77030014147 HC TY THORCENT PARA TELE -B: Performed by: INTERNAL MEDICINE

## 2021-01-01 PROCEDURE — 74011000258 HC RX REV CODE- 258: Performed by: FAMILY MEDICINE

## 2021-01-01 PROCEDURE — 3336500001 HSPC ELECTION

## 2021-01-01 PROCEDURE — 83540 ASSAY OF IRON: CPT

## 2021-01-01 PROCEDURE — 80053 COMPREHEN METABOLIC PANEL: CPT

## 2021-01-01 PROCEDURE — 74011250637 HC RX REV CODE- 250/637: Performed by: INTERNAL MEDICINE

## 2021-01-01 PROCEDURE — 65660000000 HC RM CCU STEPDOWN

## 2021-01-01 PROCEDURE — 74011636637 HC RX REV CODE- 636/637: Performed by: INTERNAL MEDICINE

## 2021-01-01 PROCEDURE — 97530 THERAPEUTIC ACTIVITIES: CPT

## 2021-01-01 PROCEDURE — 93005 ELECTROCARDIOGRAM TRACING: CPT | Performed by: EMERGENCY MEDICINE

## 2021-01-01 PROCEDURE — 82728 ASSAY OF FERRITIN: CPT

## 2021-01-01 PROCEDURE — 77030040829 HC CATH EXT URINE MDII -B

## 2021-01-01 PROCEDURE — 95816 EEG AWAKE AND DROWSY: CPT | Performed by: PSYCHIATRY & NEUROLOGY

## 2021-01-01 PROCEDURE — APPNB45 APP NON BILLABLE 31-45 MINUTES: Performed by: NURSE PRACTITIONER

## 2021-01-01 PROCEDURE — 74011250637 HC RX REV CODE- 250/637: Performed by: EMERGENCY MEDICINE

## 2021-01-01 PROCEDURE — 82607 VITAMIN B-12: CPT

## 2021-01-01 PROCEDURE — 77336 RADIATION PHYSICS CONSULT: CPT

## 2021-01-01 PROCEDURE — 74011250636 HC RX REV CODE- 250/636: Performed by: NURSE ANESTHETIST, CERTIFIED REGISTERED

## 2021-01-01 PROCEDURE — 99232 SBSQ HOSP IP/OBS MODERATE 35: CPT | Performed by: PSYCHIATRY & NEUROLOGY

## 2021-01-01 PROCEDURE — 32555 ASPIRATE PLEURA W/ IMAGING: CPT

## 2021-01-01 PROCEDURE — 88381 MICRODISSECTION MANUAL: CPT

## 2021-01-01 PROCEDURE — 93005 ELECTROCARDIOGRAM TRACING: CPT

## 2021-01-01 PROCEDURE — 83036 HEMOGLOBIN GLYCOSYLATED A1C: CPT

## 2021-01-01 PROCEDURE — 74011000636 HC RX REV CODE- 636: Performed by: INTERNAL MEDICINE

## 2021-01-01 PROCEDURE — 99153 MOD SED SAME PHYS/QHP EA: CPT

## 2021-01-01 PROCEDURE — 77412 RADIATION TX DELIVERY LVL 3: CPT

## 2021-01-01 PROCEDURE — 93970 EXTREMITY STUDY: CPT

## 2021-01-01 PROCEDURE — 77300 RADIATION THERAPY DOSE PLAN: CPT

## 2021-01-01 PROCEDURE — 94760 N-INVAS EAR/PLS OXIMETRY 1: CPT

## 2021-01-01 PROCEDURE — 85610 PROTHROMBIN TIME: CPT

## 2021-01-01 PROCEDURE — 77290 THER RAD SIMULAJ FIELD CPLX: CPT

## 2021-01-01 PROCEDURE — 81001 URINALYSIS AUTO W/SCOPE: CPT

## 2021-01-01 PROCEDURE — C9113 INJ PANTOPRAZOLE SODIUM, VIA: HCPCS | Performed by: NURSE PRACTITIONER

## 2021-01-01 PROCEDURE — 77030008771 HC TU NG SALEM SUMP -A

## 2021-01-01 PROCEDURE — 84145 PROCALCITONIN (PCT): CPT

## 2021-01-01 PROCEDURE — 85046 RETICYTE/HGB CONCENTRATE: CPT

## 2021-01-01 PROCEDURE — 71045 X-RAY EXAM CHEST 1 VIEW: CPT

## 2021-01-01 PROCEDURE — C8929 TTE W OR WO FOL WCON,DOPPLER: HCPCS

## 2021-01-01 PROCEDURE — 0651 HSPC ROUTINE HOME CARE

## 2021-01-01 PROCEDURE — 96375 TX/PRO/DX INJ NEW DRUG ADDON: CPT

## 2021-01-01 PROCEDURE — A9575 INJ GADOTERATE MEGLUMI 0.1ML: HCPCS | Performed by: INTERNAL MEDICINE

## 2021-01-01 PROCEDURE — 97116 GAIT TRAINING THERAPY: CPT

## 2021-01-01 PROCEDURE — APPSS30 APP SPLIT SHARED TIME 16-30 MINUTES: Performed by: NURSE PRACTITIONER

## 2021-01-01 PROCEDURE — 3E0G76Z INTRODUCTION OF NUTRITIONAL SUBSTANCE INTO UPPER GI, VIA NATURAL OR ARTIFICIAL OPENING: ICD-10-PCS | Performed by: FAMILY MEDICINE

## 2021-01-01 PROCEDURE — 76700 US EXAM ABDOM COMPLETE: CPT

## 2021-01-01 PROCEDURE — 87635 SARS-COV-2 COVID-19 AMP PRB: CPT

## 2021-01-01 PROCEDURE — 74011250636 HC RX REV CODE- 250/636: Performed by: PHYSICIAN ASSISTANT

## 2021-01-01 PROCEDURE — 86580 TB INTRADERMAL TEST: CPT | Performed by: INTERNAL MEDICINE

## 2021-01-01 PROCEDURE — 74011250636 HC RX REV CODE- 250/636: Performed by: EMERGENCY MEDICINE

## 2021-01-01 PROCEDURE — 96413 CHEMO IV INFUSION 1 HR: CPT

## 2021-01-01 PROCEDURE — 36600 WITHDRAWAL OF ARTERIAL BLOOD: CPT

## 2021-01-01 PROCEDURE — 97112 NEUROMUSCULAR REEDUCATION: CPT

## 2021-01-01 PROCEDURE — 72157 MRI CHEST SPINE W/O & W/DYE: CPT

## 2021-01-01 PROCEDURE — 94002 VENT MGMT INPAT INIT DAY: CPT

## 2021-01-01 PROCEDURE — 5A1945Z RESPIRATORY VENTILATION, 24-96 CONSECUTIVE HOURS: ICD-10-PCS | Performed by: EMERGENCY MEDICINE

## 2021-01-01 PROCEDURE — 71046 X-RAY EXAM CHEST 2 VIEWS: CPT

## 2021-01-01 PROCEDURE — 77001 FLUOROGUIDE FOR VEIN DEVICE: CPT

## 2021-01-01 PROCEDURE — 77295 3-D RADIOTHERAPY PLAN: CPT

## 2021-01-01 PROCEDURE — 92526 ORAL FUNCTION THERAPY: CPT

## 2021-01-01 PROCEDURE — 99211 OFF/OP EST MAY X REQ PHY/QHP: CPT

## 2021-01-01 PROCEDURE — 99152 MOD SED SAME PHYS/QHP 5/>YRS: CPT

## 2021-01-01 PROCEDURE — C1788 PORT, INDWELLING, IMP: HCPCS

## 2021-01-01 PROCEDURE — 99233 SBSQ HOSP IP/OBS HIGH 50: CPT | Performed by: INTERNAL MEDICINE

## 2021-01-01 PROCEDURE — 84480 ASSAY TRIIODOTHYRONINE (T3): CPT

## 2021-01-01 PROCEDURE — 94003 VENT MGMT INPAT SUBQ DAY: CPT

## 2021-01-01 PROCEDURE — 83605 ASSAY OF LACTIC ACID: CPT

## 2021-01-01 PROCEDURE — 88341 IMHCHEM/IMCYTCHM EA ADD ANTB: CPT

## 2021-01-01 PROCEDURE — 96417 CHEMO IV INFUS EACH ADDL SEQ: CPT

## 2021-01-01 PROCEDURE — 74011250637 HC RX REV CODE- 250/637: Performed by: FAMILY MEDICINE

## 2021-01-01 PROCEDURE — 97165 OT EVAL LOW COMPLEX 30 MIN: CPT

## 2021-01-01 PROCEDURE — 99223 1ST HOSP IP/OBS HIGH 75: CPT | Performed by: INTERNAL MEDICINE

## 2021-01-01 PROCEDURE — 76060000032 HC ANESTHESIA 0.5 TO 1 HR: Performed by: RADIOLOGY

## 2021-01-01 PROCEDURE — 74011000250 HC RX REV CODE- 250: Performed by: RADIOLOGY

## 2021-01-01 PROCEDURE — 96411 CHEMO IV PUSH ADDL DRUG: CPT

## 2021-01-01 PROCEDURE — 97535 SELF CARE MNGMENT TRAINING: CPT

## 2021-01-01 PROCEDURE — 3336590001 HSPC ROOM AND BOARD

## 2021-01-01 PROCEDURE — 99223 1ST HOSP IP/OBS HIGH 75: CPT | Performed by: PSYCHIATRY & NEUROLOGY

## 2021-01-01 PROCEDURE — 74011250636 HC RX REV CODE- 250/636: Performed by: RADIOLOGY

## 2021-01-01 PROCEDURE — 80202 ASSAY OF VANCOMYCIN: CPT

## 2021-01-01 PROCEDURE — 85027 COMPLETE CBC AUTOMATED: CPT

## 2021-01-01 PROCEDURE — 70450 CT HEAD/BRAIN W/O DYE: CPT

## 2021-01-01 PROCEDURE — 76040000019: Performed by: INTERNAL MEDICINE

## 2021-01-01 PROCEDURE — 77334 RADIATION TREATMENT AID(S): CPT

## 2021-01-01 PROCEDURE — 74230 X-RAY XM SWLNG FUNCJ C+: CPT

## 2021-01-01 PROCEDURE — 81003 URINALYSIS AUTO W/O SCOPE: CPT

## 2021-01-01 PROCEDURE — 74011000250 HC RX REV CODE- 250: Performed by: PHYSICIAN ASSISTANT

## 2021-01-01 PROCEDURE — A9552 F18 FDG: HCPCS

## 2021-01-01 PROCEDURE — 36592 COLLECT BLOOD FROM PICC: CPT

## 2021-01-01 PROCEDURE — 76604 US EXAM CHEST: CPT | Performed by: INTERNAL MEDICINE

## 2021-01-01 PROCEDURE — 84443 ASSAY THYROID STIM HORMONE: CPT

## 2021-01-01 PROCEDURE — 36591 DRAW BLOOD OFF VENOUS DEVICE: CPT

## 2021-01-01 PROCEDURE — 84484 ASSAY OF TROPONIN QUANT: CPT

## 2021-01-01 PROCEDURE — 0BH17EZ INSERTION OF ENDOTRACHEAL AIRWAY INTO TRACHEA, VIA NATURAL OR ARTIFICIAL OPENING: ICD-10-PCS | Performed by: EMERGENCY MEDICINE

## 2021-01-01 PROCEDURE — 88305 TISSUE EXAM BY PATHOLOGIST: CPT

## 2021-01-01 PROCEDURE — 82140 ASSAY OF AMMONIA: CPT

## 2021-01-01 PROCEDURE — 32555 ASPIRATE PLEURA W/ IMAGING: CPT | Performed by: INTERNAL MEDICINE

## 2021-01-01 PROCEDURE — 77280 THER RAD SIMULAJ FIELD SMPL: CPT

## 2021-01-01 PROCEDURE — 77010033678 HC OXYGEN DAILY

## 2021-01-01 PROCEDURE — 82803 BLOOD GASES ANY COMBINATION: CPT

## 2021-01-01 PROCEDURE — 74011000250 HC RX REV CODE- 250: Performed by: NURSE ANESTHETIST, CERTIFIED REGISTERED

## 2021-01-01 PROCEDURE — 77030036720 CT BX ABD MASS NDL PERC

## 2021-01-01 PROCEDURE — 80061 LIPID PANEL: CPT

## 2021-01-01 PROCEDURE — 87070 CULTURE OTHR SPECIMN AEROBIC: CPT

## 2021-01-01 PROCEDURE — 93971 EXTREMITY STUDY: CPT

## 2021-01-01 PROCEDURE — 77030010507 HC ADH SKN DERMBND J&J -B

## 2021-01-01 PROCEDURE — 2709999900 HC NON-CHARGEABLE SUPPLY: Performed by: INTERNAL MEDICINE

## 2021-01-01 PROCEDURE — 77030031139 HC SUT VCRL2 J&J -A

## 2021-01-01 PROCEDURE — 96365 THER/PROPH/DIAG IV INF INIT: CPT

## 2021-01-01 PROCEDURE — 85379 FIBRIN DEGRADATION QUANT: CPT

## 2021-01-01 PROCEDURE — 77030014146 HC TY THORCENT/PARACENT BD -B

## 2021-01-01 PROCEDURE — 82270 OCCULT BLOOD FECES: CPT

## 2021-01-01 PROCEDURE — 74011000302 HC RX REV CODE- 302: Performed by: INTERNAL MEDICINE

## 2021-01-01 PROCEDURE — C9113 INJ PANTOPRAZOLE SODIUM, VIA: HCPCS | Performed by: INTERNAL MEDICINE

## 2021-01-01 PROCEDURE — 77399 UNLISTED PX MED RADJ PHYSICS: CPT

## 2021-01-01 PROCEDURE — U0005 INFEC AGEN DETEC AMPLI PROBE: HCPCS

## 2021-01-01 RX ORDER — FOLIC ACID 1 MG/1
1 TABLET ORAL DAILY
Status: DISCONTINUED | OUTPATIENT
Start: 2021-01-01 | End: 2021-01-01 | Stop reason: HOSPADM

## 2021-01-01 RX ORDER — ACETAMINOPHEN 650 MG/1
650 SUPPOSITORY RECTAL
Status: DISCONTINUED | OUTPATIENT
Start: 2021-01-01 | End: 2021-01-01 | Stop reason: HOSPADM

## 2021-01-01 RX ORDER — SODIUM CHLORIDE 0.9 % (FLUSH) 0.9 %
5-40 SYRINGE (ML) INJECTION EVERY 8 HOURS
Status: DISCONTINUED | OUTPATIENT
Start: 2021-01-01 | End: 2021-01-01 | Stop reason: HOSPADM

## 2021-01-01 RX ORDER — PROPOFOL 10 MG/ML
INJECTION, EMULSION INTRAVENOUS
Status: DISCONTINUED | OUTPATIENT
Start: 2021-01-01 | End: 2021-01-01 | Stop reason: HOSPADM

## 2021-01-01 RX ORDER — SODIUM CHLORIDE 0.9 % (FLUSH) 0.9 %
5-40 SYRINGE (ML) INJECTION EVERY 8 HOURS
Status: CANCELLED | OUTPATIENT
Start: 2021-01-01

## 2021-01-01 RX ORDER — GADOTERATE MEGLUMINE 376.9 MG/ML
19 INJECTION INTRAVENOUS
Status: COMPLETED | OUTPATIENT
Start: 2021-01-01 | End: 2021-01-01

## 2021-01-01 RX ORDER — ONDANSETRON 2 MG/ML
4 INJECTION INTRAMUSCULAR; INTRAVENOUS
Qty: 10 ML | Refills: 0 | Status: SHIPPED
Start: 2021-01-01

## 2021-01-01 RX ORDER — SODIUM CHLORIDE 0.9 % (FLUSH) 0.9 %
3 SYRINGE (ML) INJECTION AS NEEDED
Status: DISCONTINUED | OUTPATIENT
Start: 2021-01-01 | End: 2021-01-01

## 2021-01-01 RX ORDER — PROPOFOL 10 MG/ML
INJECTION, EMULSION INTRAVENOUS
Status: ACTIVE
Start: 2021-01-01 | End: 2021-01-01

## 2021-01-01 RX ORDER — SODIUM CHLORIDE 0.9 % (FLUSH) 0.9 %
10 SYRINGE (ML) INJECTION
Status: COMPLETED | OUTPATIENT
Start: 2021-01-01 | End: 2021-01-01

## 2021-01-01 RX ORDER — ALBUTEROL SULFATE 0.83 MG/ML
2.5 SOLUTION RESPIRATORY (INHALATION) AS NEEDED
Status: CANCELLED
Start: 2021-01-01

## 2021-01-01 RX ORDER — HYOSCYAMINE SULFATE 0.12 MG/1
0.12 TABLET SUBLINGUAL
Status: DISCONTINUED | OUTPATIENT
Start: 2021-01-01 | End: 2021-01-01

## 2021-01-01 RX ORDER — POTASSIUM CHLORIDE 14.9 MG/ML
20 INJECTION INTRAVENOUS
Status: COMPLETED | OUTPATIENT
Start: 2021-01-01 | End: 2021-01-01

## 2021-01-01 RX ORDER — ACETAMINOPHEN 325 MG/1
650 TABLET ORAL
Status: DISCONTINUED | OUTPATIENT
Start: 2021-01-01 | End: 2021-01-01 | Stop reason: HOSPADM

## 2021-01-01 RX ORDER — ONDANSETRON 2 MG/ML
8 INJECTION INTRAMUSCULAR; INTRAVENOUS AS NEEDED
Status: CANCELLED | OUTPATIENT
Start: 2021-01-01

## 2021-01-01 RX ORDER — SODIUM CHLORIDE 0.9 % (FLUSH) 0.9 %
10 SYRINGE (ML) INJECTION AS NEEDED
Status: ACTIVE | OUTPATIENT
Start: 2021-01-01 | End: 2021-01-01

## 2021-01-01 RX ORDER — ENOXAPARIN SODIUM 100 MG/ML
40 INJECTION SUBCUTANEOUS EVERY 24 HOURS
Status: DISCONTINUED | OUTPATIENT
Start: 2021-01-01 | End: 2021-01-01 | Stop reason: HOSPADM

## 2021-01-01 RX ORDER — VANCOMYCIN 2 GRAM/500 ML IN 0.9 % SODIUM CHLORIDE INTRAVENOUS
2000 ONCE
Status: COMPLETED | OUTPATIENT
Start: 2021-01-01 | End: 2021-01-01

## 2021-01-01 RX ORDER — ONDANSETRON 2 MG/ML
4 INJECTION INTRAMUSCULAR; INTRAVENOUS
Status: DISCONTINUED | OUTPATIENT
Start: 2021-01-01 | End: 2021-01-01 | Stop reason: HOSPADM

## 2021-01-01 RX ORDER — SODIUM CHLORIDE 9 MG/ML
25 INJECTION, SOLUTION INTRAVENOUS CONTINUOUS
Status: ACTIVE | OUTPATIENT
Start: 2021-01-01 | End: 2021-01-01

## 2021-01-01 RX ORDER — LIDOCAINE HYDROCHLORIDE 20 MG/ML
1-20 INJECTION, SOLUTION INFILTRATION; PERINEURAL ONCE
Status: ACTIVE | OUTPATIENT
Start: 2021-01-01 | End: 2021-01-01

## 2021-01-01 RX ORDER — SODIUM CHLORIDE 0.9 % (FLUSH) 0.9 %
5-40 SYRINGE (ML) INJECTION AS NEEDED
Status: DISCONTINUED | OUTPATIENT
Start: 2021-01-01 | End: 2021-01-01 | Stop reason: HOSPADM

## 2021-01-01 RX ORDER — DEXAMETHASONE 4 MG/1
4 TABLET ORAL EVERY 8 HOURS
Status: DISCONTINUED | OUTPATIENT
Start: 2021-01-01 | End: 2021-01-01

## 2021-01-01 RX ORDER — OXYCODONE HYDROCHLORIDE 5 MG/1
10 TABLET ORAL
Status: CANCELLED | OUTPATIENT
Start: 2021-01-01 | End: 2021-01-01

## 2021-01-01 RX ORDER — FENTANYL 25 UG/1
1 PATCH TRANSDERMAL
Status: DISCONTINUED | OUTPATIENT
Start: 2021-01-01 | End: 2021-01-01 | Stop reason: HOSPADM

## 2021-01-01 RX ORDER — SODIUM CHLORIDE, SODIUM LACTATE, POTASSIUM CHLORIDE, CALCIUM CHLORIDE 600; 310; 30; 20 MG/100ML; MG/100ML; MG/100ML; MG/100ML
100 INJECTION, SOLUTION INTRAVENOUS CONTINUOUS
Status: CANCELLED | OUTPATIENT
Start: 2021-01-01

## 2021-01-01 RX ORDER — LORAZEPAM 1 MG/1
1 TABLET ORAL
Status: DISCONTINUED | OUTPATIENT
Start: 2021-01-01 | End: 2021-01-01

## 2021-01-01 RX ORDER — LORAZEPAM 2 MG/ML
2 INJECTION INTRAMUSCULAR
Status: DISCONTINUED | OUTPATIENT
Start: 2021-01-01 | End: 2021-01-01 | Stop reason: HOSPADM

## 2021-01-01 RX ORDER — SODIUM CHLORIDE 9 MG/ML
500 INJECTION, SOLUTION INTRAVENOUS CONTINUOUS
Status: DISCONTINUED | OUTPATIENT
Start: 2021-01-01 | End: 2021-01-01 | Stop reason: HOSPADM

## 2021-01-01 RX ORDER — SENNOSIDES 8.6 MG/1
1 TABLET ORAL 2 TIMES DAILY
Status: DISCONTINUED | OUTPATIENT
Start: 2021-01-01 | End: 2021-01-01

## 2021-01-01 RX ORDER — GLYCOPYRROLATE 0.2 MG/ML
0.2 INJECTION INTRAMUSCULAR; INTRAVENOUS
Status: DISCONTINUED | OUTPATIENT
Start: 2021-01-01 | End: 2021-01-01

## 2021-01-01 RX ORDER — AMLODIPINE BESYLATE 5 MG/1
5 TABLET ORAL DAILY
Status: DISCONTINUED | OUTPATIENT
Start: 2021-01-01 | End: 2021-01-01

## 2021-01-01 RX ORDER — ACETAMINOPHEN 500 MG
1000 TABLET ORAL ONCE
Status: CANCELLED | OUTPATIENT
Start: 2021-01-01 | End: 2021-01-01

## 2021-01-01 RX ORDER — MORPHINE SULFATE 2 MG/ML
2 INJECTION, SOLUTION INTRAMUSCULAR; INTRAVENOUS
Status: DISCONTINUED | OUTPATIENT
Start: 2021-01-01 | End: 2021-01-01

## 2021-01-01 RX ORDER — SODIUM CHLORIDE 0.9 % (FLUSH) 0.9 %
10 SYRINGE (ML) INJECTION AS NEEDED
Status: DISCONTINUED | OUTPATIENT
Start: 2021-01-01 | End: 2021-01-01 | Stop reason: HOSPADM

## 2021-01-01 RX ORDER — LORAZEPAM 2 MG/ML
1 INJECTION INTRAMUSCULAR
Qty: 1 VIAL | Refills: 0 | Status: SHIPPED
Start: 2021-01-01

## 2021-01-01 RX ORDER — ONDANSETRON 2 MG/ML
8 INJECTION INTRAMUSCULAR; INTRAVENOUS ONCE
Status: COMPLETED | OUTPATIENT
Start: 2021-01-01 | End: 2021-01-01

## 2021-01-01 RX ORDER — HEPARIN SODIUM (PORCINE) LOCK FLUSH IV SOLN 100 UNIT/ML 100 UNIT/ML
500 SOLUTION INTRAVENOUS ONCE
Status: COMPLETED | OUTPATIENT
Start: 2021-01-01 | End: 2021-01-01

## 2021-01-01 RX ORDER — IPRATROPIUM BROMIDE AND ALBUTEROL SULFATE 2.5; .5 MG/3ML; MG/3ML
3 SOLUTION RESPIRATORY (INHALATION)
Status: DISCONTINUED | OUTPATIENT
Start: 2021-01-01 | End: 2021-01-01 | Stop reason: HOSPADM

## 2021-01-01 RX ORDER — DIPHENHYDRAMINE HYDROCHLORIDE 50 MG/ML
25 INJECTION, SOLUTION INTRAMUSCULAR; INTRAVENOUS AS NEEDED
Status: CANCELLED
Start: 2021-01-01

## 2021-01-01 RX ORDER — GUAIFENESIN 100 MG/5ML
81 LIQUID (ML) ORAL DAILY
Status: DISCONTINUED | OUTPATIENT
Start: 2021-01-01 | End: 2021-01-01 | Stop reason: HOSPADM

## 2021-01-01 RX ORDER — DIPHENHYDRAMINE HYDROCHLORIDE 50 MG/ML
12.5 INJECTION, SOLUTION INTRAMUSCULAR; INTRAVENOUS
Status: CANCELLED | OUTPATIENT
Start: 2021-01-01

## 2021-01-01 RX ORDER — SODIUM CHLORIDE, SODIUM LACTATE, POTASSIUM CHLORIDE, CALCIUM CHLORIDE 600; 310; 30; 20 MG/100ML; MG/100ML; MG/100ML; MG/100ML
100 INJECTION, SOLUTION INTRAVENOUS CONTINUOUS
Status: CANCELLED | OUTPATIENT
Start: 2021-01-01 | End: 2021-01-01

## 2021-01-01 RX ORDER — HYDRALAZINE HYDROCHLORIDE 50 MG/1
50 TABLET, FILM COATED ORAL ONCE
Status: COMPLETED | OUTPATIENT
Start: 2021-01-01 | End: 2021-01-01

## 2021-01-01 RX ORDER — LIDOCAINE HYDROCHLORIDE 20 MG/ML
20-300 INJECTION, SOLUTION INFILTRATION; PERINEURAL
Status: DISCONTINUED | OUTPATIENT
Start: 2021-01-01 | End: 2021-01-01 | Stop reason: HOSPADM

## 2021-01-01 RX ORDER — LORAZEPAM 2 MG/ML
1 INJECTION INTRAMUSCULAR
Status: DISCONTINUED | OUTPATIENT
Start: 2021-01-01 | End: 2021-01-01 | Stop reason: HOSPADM

## 2021-01-01 RX ORDER — DEXAMETHASONE SODIUM PHOSPHATE 4 MG/ML
4 INJECTION, SOLUTION INTRA-ARTICULAR; INTRALESIONAL; INTRAMUSCULAR; INTRAVENOUS; SOFT TISSUE 2 TIMES DAILY WITH MEALS
Status: COMPLETED | OUTPATIENT
Start: 2021-01-01 | End: 2021-01-01

## 2021-01-01 RX ORDER — LIDOCAINE HYDROCHLORIDE 20 MG/ML
INJECTION, SOLUTION EPIDURAL; INFILTRATION; INTRACAUDAL; PERINEURAL AS NEEDED
Status: DISCONTINUED | OUTPATIENT
Start: 2021-01-01 | End: 2021-01-01 | Stop reason: HOSPADM

## 2021-01-01 RX ORDER — FACIAL-BODY WIPES
10 EACH TOPICAL AS NEEDED
Status: DISCONTINUED | OUTPATIENT
Start: 2021-01-01 | End: 2021-01-01 | Stop reason: HOSPADM

## 2021-01-01 RX ORDER — DEXTROSE MONOHYDRATE 50 MG/ML
75 INJECTION, SOLUTION INTRAVENOUS CONTINUOUS
Status: DISCONTINUED | OUTPATIENT
Start: 2021-01-01 | End: 2021-01-01

## 2021-01-01 RX ORDER — GLYCOPYRROLATE 0.2 MG/ML
0.1 INJECTION INTRAMUSCULAR; INTRAVENOUS
Status: DISCONTINUED | OUTPATIENT
Start: 2021-01-01 | End: 2021-01-01 | Stop reason: HOSPADM

## 2021-01-01 RX ORDER — NALOXONE HYDROCHLORIDE 0.4 MG/ML
0.2 INJECTION, SOLUTION INTRAMUSCULAR; INTRAVENOUS; SUBCUTANEOUS AS NEEDED
Status: CANCELLED | OUTPATIENT
Start: 2021-01-01

## 2021-01-01 RX ORDER — MORPHINE SULFATE 100 MG/5ML
10 SOLUTION ORAL
Status: DISCONTINUED | OUTPATIENT
Start: 2021-01-01 | End: 2021-01-01

## 2021-01-01 RX ORDER — ACETAMINOPHEN 325 MG/1
650 TABLET ORAL
Status: DISCONTINUED | OUTPATIENT
Start: 2021-01-01 | End: 2021-01-01

## 2021-01-01 RX ORDER — SODIUM CHLORIDE, SODIUM LACTATE, POTASSIUM CHLORIDE, CALCIUM CHLORIDE 600; 310; 30; 20 MG/100ML; MG/100ML; MG/100ML; MG/100ML
INJECTION, SOLUTION INTRAVENOUS
Status: DISCONTINUED | OUTPATIENT
Start: 2021-01-01 | End: 2021-01-01 | Stop reason: HOSPADM

## 2021-01-01 RX ORDER — FENTANYL 12.5 UG/1
1 PATCH TRANSDERMAL
Status: DISCONTINUED | OUTPATIENT
Start: 2021-01-01 | End: 2021-01-01 | Stop reason: HOSPADM

## 2021-01-01 RX ORDER — ATORVASTATIN CALCIUM 40 MG/1
40 TABLET, FILM COATED ORAL
Status: DISCONTINUED | OUTPATIENT
Start: 2021-01-01 | End: 2021-01-01 | Stop reason: HOSPADM

## 2021-01-01 RX ORDER — HEPARIN SODIUM 5000 [USP'U]/ML
5000 INJECTION, SOLUTION INTRAVENOUS; SUBCUTANEOUS EVERY 12 HOURS
Status: DISCONTINUED | OUTPATIENT
Start: 2021-01-01 | End: 2021-01-01

## 2021-01-01 RX ORDER — SODIUM CHLORIDE 0.9 % (FLUSH) 0.9 %
5-10 SYRINGE (ML) INJECTION AS NEEDED
Status: DISCONTINUED | OUTPATIENT
Start: 2021-01-01 | End: 2021-01-01

## 2021-01-01 RX ORDER — PROPOFOL 10 MG/ML
10 VIAL (ML) INTRAVENOUS
Status: DISCONTINUED | OUTPATIENT
Start: 2021-01-01 | End: 2021-01-01

## 2021-01-01 RX ORDER — DEXAMETHASONE SODIUM PHOSPHATE 4 MG/ML
4 INJECTION, SOLUTION INTRA-ARTICULAR; INTRALESIONAL; INTRAMUSCULAR; INTRAVENOUS; SOFT TISSUE EVERY 8 HOURS
Status: DISCONTINUED | OUTPATIENT
Start: 2021-01-01 | End: 2021-01-01

## 2021-01-01 RX ORDER — MORPHINE SULFATE 4 MG/ML
4 INJECTION INTRAVENOUS
Status: DISCONTINUED | OUTPATIENT
Start: 2021-01-01 | End: 2021-01-01

## 2021-01-01 RX ORDER — CYANOCOBALAMIN 1000 UG/ML
1000 INJECTION, SOLUTION INTRAMUSCULAR; SUBCUTANEOUS
Status: CANCELLED | OUTPATIENT
Start: 2021-01-01

## 2021-01-01 RX ORDER — DEXAMETHASONE SODIUM PHOSPHATE 4 MG/ML
4 INJECTION, SOLUTION INTRA-ARTICULAR; INTRALESIONAL; INTRAMUSCULAR; INTRAVENOUS; SOFT TISSUE EVERY 12 HOURS
Status: DISCONTINUED | OUTPATIENT
Start: 2021-01-01 | End: 2021-01-01

## 2021-01-01 RX ORDER — SODIUM CHLORIDE 0.9 % (FLUSH) 0.9 %
5-40 SYRINGE (ML) INJECTION EVERY 8 HOURS
Status: DISCONTINUED | OUTPATIENT
Start: 2021-01-01 | End: 2021-01-01

## 2021-01-01 RX ORDER — LABETALOL HYDROCHLORIDE 5 MG/ML
5 INJECTION, SOLUTION INTRAVENOUS
Status: DISCONTINUED | OUTPATIENT
Start: 2021-01-01 | End: 2021-01-01 | Stop reason: HOSPADM

## 2021-01-01 RX ORDER — DIPHENHYDRAMINE HYDROCHLORIDE 50 MG/ML
25-50 INJECTION, SOLUTION INTRAMUSCULAR; INTRAVENOUS
Status: DISCONTINUED | OUTPATIENT
Start: 2021-01-01 | End: 2021-01-01 | Stop reason: HOSPADM

## 2021-01-01 RX ORDER — FLUMAZENIL 0.1 MG/ML
0.2 INJECTION INTRAVENOUS
Status: CANCELLED | OUTPATIENT
Start: 2021-01-01

## 2021-01-01 RX ORDER — HYDROMORPHONE HYDROCHLORIDE 2 MG/ML
0.5 INJECTION, SOLUTION INTRAMUSCULAR; INTRAVENOUS; SUBCUTANEOUS
Status: CANCELLED | OUTPATIENT
Start: 2021-01-01

## 2021-01-01 RX ORDER — MORPHINE SULFATE 2 MG/ML
2 INJECTION, SOLUTION INTRAMUSCULAR; INTRAVENOUS
Qty: 5 ML | Refills: 0 | Status: SHIPPED
Start: 2021-01-01 | End: 2021-01-01

## 2021-01-01 RX ORDER — FAMOTIDINE 20 MG/1
20 TABLET, FILM COATED ORAL 2 TIMES DAILY
Status: CANCELLED | OUTPATIENT
Start: 2021-01-01

## 2021-01-01 RX ORDER — OXYCODONE HYDROCHLORIDE 5 MG/1
5 TABLET ORAL
Status: CANCELLED | OUTPATIENT
Start: 2021-01-01 | End: 2021-01-01

## 2021-01-01 RX ORDER — INSULIN LISPRO 100 [IU]/ML
0-10 INJECTION, SOLUTION INTRAVENOUS; SUBCUTANEOUS
Status: DISCONTINUED | OUTPATIENT
Start: 2021-01-01 | End: 2021-01-01 | Stop reason: HOSPADM

## 2021-01-01 RX ORDER — POLYETHYLENE GLYCOL 3350 17 G/17G
17 POWDER, FOR SOLUTION ORAL DAILY PRN
Status: DISCONTINUED | OUTPATIENT
Start: 2021-01-01 | End: 2021-01-01

## 2021-01-01 RX ORDER — LIDOCAINE HYDROCHLORIDE 10 MG/ML
0.1 INJECTION INFILTRATION; PERINEURAL AS NEEDED
Status: CANCELLED | OUTPATIENT
Start: 2021-01-01

## 2021-01-01 RX ORDER — HYDROMORPHONE HYDROCHLORIDE 1 MG/ML
1 INJECTION, SOLUTION INTRAMUSCULAR; INTRAVENOUS; SUBCUTANEOUS
Status: DISCONTINUED | OUTPATIENT
Start: 2021-01-01 | End: 2021-01-01 | Stop reason: HOSPADM

## 2021-01-01 RX ORDER — GUAIFENESIN 100 MG/5ML
324 LIQUID (ML) ORAL
Status: COMPLETED | OUTPATIENT
Start: 2021-01-01 | End: 2021-01-01

## 2021-01-01 RX ORDER — HALOPERIDOL 5 MG/ML
2 INJECTION INTRAMUSCULAR
Status: DISCONTINUED | OUTPATIENT
Start: 2021-01-01 | End: 2021-01-01 | Stop reason: HOSPADM

## 2021-01-01 RX ORDER — EPINEPHRINE 1 MG/ML
0.3 INJECTION, SOLUTION, CONCENTRATE INTRAVENOUS AS NEEDED
Status: CANCELLED | OUTPATIENT
Start: 2021-01-01

## 2021-01-01 RX ORDER — SODIUM CHLORIDE 0.9 % (FLUSH) 0.9 %
5-40 SYRINGE (ML) INJECTION AS NEEDED
Status: CANCELLED | OUTPATIENT
Start: 2021-01-01

## 2021-01-01 RX ORDER — FENTANYL CITRATE-0.9 % NACL/PF 25 MCG/ML
0-1.5 PLASTIC BAG, INJECTION (ML) INJECTION
Status: DISCONTINUED | OUTPATIENT
Start: 2021-01-01 | End: 2021-01-01

## 2021-01-01 RX ORDER — GLYCOPYRROLATE 0.2 MG/ML
0.1 INJECTION INTRAMUSCULAR; INTRAVENOUS
Qty: 1 ML | Refills: 0 | Status: SHIPPED
Start: 2021-01-01

## 2021-01-01 RX ORDER — MORPHINE SULFATE 2 MG/ML
1-2 INJECTION, SOLUTION INTRAMUSCULAR; INTRAVENOUS
Status: DISCONTINUED | OUTPATIENT
Start: 2021-01-01 | End: 2021-01-01 | Stop reason: HOSPADM

## 2021-01-01 RX ORDER — SODIUM CHLORIDE 9 MG/ML
150 INJECTION, SOLUTION INTRAVENOUS CONTINUOUS
Status: DISCONTINUED | OUTPATIENT
Start: 2021-01-01 | End: 2021-01-01 | Stop reason: HOSPADM

## 2021-01-01 RX ORDER — CEFAZOLIN SODIUM/WATER 2 G/20 ML
2 SYRINGE (ML) INTRAVENOUS ONCE
Status: COMPLETED | OUTPATIENT
Start: 2021-01-01 | End: 2021-01-01

## 2021-01-01 RX ORDER — DIPHENHYDRAMINE HYDROCHLORIDE 50 MG/ML
50 INJECTION, SOLUTION INTRAMUSCULAR; INTRAVENOUS AS NEEDED
Status: CANCELLED
Start: 2021-01-01

## 2021-01-01 RX ORDER — LEVOTHYROXINE AND LIOTHYRONINE 38; 9 UG/1; UG/1
150 TABLET ORAL DAILY
Status: DISCONTINUED | OUTPATIENT
Start: 2021-01-01 | End: 2021-01-01

## 2021-01-01 RX ORDER — LORAZEPAM 0.5 MG/1
0.5 TABLET ORAL
Status: DISCONTINUED | OUTPATIENT
Start: 2021-01-01 | End: 2021-01-01 | Stop reason: HOSPADM

## 2021-01-01 RX ORDER — FENTANYL 25 UG/1
1 PATCH TRANSDERMAL
Status: DISCONTINUED | OUTPATIENT
Start: 2021-01-01 | End: 2021-01-01

## 2021-01-01 RX ORDER — IPRATROPIUM BROMIDE AND ALBUTEROL SULFATE 2.5; .5 MG/3ML; MG/3ML
3 SOLUTION RESPIRATORY (INHALATION) EVERY 8 HOURS
Status: DISCONTINUED | OUTPATIENT
Start: 2021-01-01 | End: 2021-01-01 | Stop reason: HOSPADM

## 2021-01-01 RX ORDER — GLYCOPYRROLATE 0.2 MG/ML
0.2 INJECTION INTRAMUSCULAR; INTRAVENOUS
Status: DISCONTINUED | OUTPATIENT
Start: 2021-01-01 | End: 2021-01-01 | Stop reason: HOSPADM

## 2021-01-01 RX ORDER — LEVOTHYROXINE AND LIOTHYRONINE 76; 18 UG/1; UG/1
150 TABLET ORAL DAILY
Status: DISCONTINUED | OUTPATIENT
Start: 2021-01-01 | End: 2021-01-01

## 2021-01-01 RX ORDER — MIDAZOLAM HYDROCHLORIDE 1 MG/ML
.5-2 INJECTION, SOLUTION INTRAMUSCULAR; INTRAVENOUS
Status: DISCONTINUED | OUTPATIENT
Start: 2021-01-01 | End: 2021-01-01 | Stop reason: HOSPADM

## 2021-01-01 RX ORDER — SODIUM CHLORIDE, SODIUM LACTATE, POTASSIUM CHLORIDE, CALCIUM CHLORIDE 600; 310; 30; 20 MG/100ML; MG/100ML; MG/100ML; MG/100ML
100 INJECTION, SOLUTION INTRAVENOUS CONTINUOUS
Status: DISPENSED | OUTPATIENT
Start: 2021-01-01 | End: 2021-01-01

## 2021-01-01 RX ORDER — HYDROCORTISONE SODIUM SUCCINATE 100 MG/2ML
100 INJECTION, POWDER, FOR SOLUTION INTRAMUSCULAR; INTRAVENOUS AS NEEDED
Status: CANCELLED | OUTPATIENT
Start: 2021-01-01

## 2021-01-01 RX ORDER — LIDOCAINE HYDROCHLORIDE AND EPINEPHRINE 10; 10 MG/ML; UG/ML
1.5 INJECTION, SOLUTION INFILTRATION; PERINEURAL ONCE
Status: COMPLETED | OUTPATIENT
Start: 2021-01-01 | End: 2021-01-01

## 2021-01-01 RX ORDER — SODIUM CHLORIDE 0.9 % (FLUSH) 0.9 %
5-40 SYRINGE (ML) INJECTION AS NEEDED
Status: DISCONTINUED | OUTPATIENT
Start: 2021-01-01 | End: 2021-01-01 | Stop reason: SDUPTHER

## 2021-01-01 RX ORDER — MIDODRINE HYDROCHLORIDE 5 MG/1
10 TABLET ORAL
Status: DISCONTINUED | OUTPATIENT
Start: 2021-01-01 | End: 2021-01-01

## 2021-01-01 RX ORDER — LANOLIN ALCOHOL/MO/W.PET/CERES
100 CREAM (GRAM) TOPICAL DAILY
Status: DISCONTINUED | OUTPATIENT
Start: 2021-01-01 | End: 2021-01-01 | Stop reason: HOSPADM

## 2021-01-01 RX ORDER — HYDROCODONE BITARTRATE AND ACETAMINOPHEN 7.5; 325 MG/1; MG/1
1 TABLET ORAL
Status: DISCONTINUED | OUTPATIENT
Start: 2021-01-01 | End: 2021-01-01 | Stop reason: HOSPADM

## 2021-01-01 RX ORDER — HEPARIN 100 UNIT/ML
300 SYRINGE INTRAVENOUS EVERY 12 HOURS
Status: DISCONTINUED | OUTPATIENT
Start: 2021-01-01 | End: 2021-01-01

## 2021-01-01 RX ORDER — LOPERAMIDE HYDROCHLORIDE 2 MG/1
4 CAPSULE ORAL AS NEEDED
Status: DISCONTINUED | OUTPATIENT
Start: 2021-01-01 | End: 2021-01-01

## 2021-01-01 RX ORDER — MORPHINE SULFATE 2 MG/ML
2 INJECTION, SOLUTION INTRAMUSCULAR; INTRAVENOUS
Status: DISCONTINUED | OUTPATIENT
Start: 2021-01-01 | End: 2021-01-01 | Stop reason: HOSPADM

## 2021-01-01 RX ORDER — HEPARIN 100 UNIT/ML
300 SYRINGE INTRAVENOUS EVERY 12 HOURS
Status: DISCONTINUED | OUTPATIENT
Start: 2021-01-01 | End: 2021-01-01 | Stop reason: HOSPADM

## 2021-01-01 RX ORDER — FENTANYL CITRATE 50 UG/ML
25-100 INJECTION, SOLUTION INTRAMUSCULAR; INTRAVENOUS
Status: DISCONTINUED | OUTPATIENT
Start: 2021-01-01 | End: 2021-01-01 | Stop reason: HOSPADM

## 2021-01-01 RX ORDER — HEPARIN 100 UNIT/ML
300-500 SYRINGE INTRAVENOUS AS NEEDED
Status: CANCELLED
Start: 2021-01-01

## 2021-01-01 RX ORDER — SODIUM CHLORIDE 9 MG/ML
10 INJECTION INTRAMUSCULAR; INTRAVENOUS; SUBCUTANEOUS AS NEEDED
Status: CANCELLED | OUTPATIENT
Start: 2021-01-01

## 2021-01-01 RX ORDER — ACETAMINOPHEN 325 MG/1
650 TABLET ORAL AS NEEDED
Status: CANCELLED
Start: 2021-01-01

## 2021-01-01 RX ORDER — SODIUM CHLORIDE 0.9 % (FLUSH) 0.9 %
3 SYRINGE (ML) INJECTION EVERY 12 HOURS
Status: DISCONTINUED | OUTPATIENT
Start: 2021-01-01 | End: 2021-01-01

## 2021-01-01 RX ORDER — SODIUM CHLORIDE 0.9 % (FLUSH) 0.9 %
10 SYRINGE (ML) INJECTION EVERY 12 HOURS
Status: DISCONTINUED | OUTPATIENT
Start: 2021-01-01 | End: 2021-01-01 | Stop reason: HOSPADM

## 2021-01-01 RX ORDER — POLYETHYLENE GLYCOL 3350 17 G/17G
17 POWDER, FOR SOLUTION ORAL
Status: DISCONTINUED | OUTPATIENT
Start: 2021-01-01 | End: 2021-01-01 | Stop reason: HOSPADM

## 2021-01-01 RX ORDER — ONDANSETRON 2 MG/ML
INJECTION INTRAMUSCULAR; INTRAVENOUS AS NEEDED
Status: DISCONTINUED | OUTPATIENT
Start: 2021-01-01 | End: 2021-01-01 | Stop reason: HOSPADM

## 2021-01-01 RX ORDER — PROPOFOL 10 MG/ML
INJECTION, EMULSION INTRAVENOUS AS NEEDED
Status: DISCONTINUED | OUTPATIENT
Start: 2021-01-01 | End: 2021-01-01 | Stop reason: HOSPADM

## 2021-01-01 RX ORDER — POLYETHYLENE GLYCOL 3350 17 G/17G
17 POWDER, FOR SOLUTION ORAL DAILY PRN
Status: DISCONTINUED | OUTPATIENT
Start: 2021-01-01 | End: 2021-01-01 | Stop reason: HOSPADM

## 2021-01-01 RX ORDER — SODIUM CHLORIDE 0.9 % (FLUSH) 0.9 %
5-40 SYRINGE (ML) INJECTION EVERY 8 HOURS
Status: DISCONTINUED | OUTPATIENT
Start: 2021-01-01 | End: 2021-01-01 | Stop reason: SDUPTHER

## 2021-01-01 RX ORDER — HEPARIN SODIUM 1000 [USP'U]/ML
5000 INJECTION, SOLUTION INTRAVENOUS; SUBCUTANEOUS ONCE
Status: ACTIVE | OUTPATIENT
Start: 2021-01-01 | End: 2021-01-01

## 2021-01-01 RX ADMIN — HYOSCYAMINE SULFATE 0.12 MG: 0.12 TABLET ORAL; SUBLINGUAL at 05:52

## 2021-01-01 RX ADMIN — PIPERACILLIN AND TAZOBACTAM 4.5 G: 4; .5 INJECTION, POWDER, FOR SOLUTION INTRAVENOUS at 05:18

## 2021-01-01 RX ADMIN — Medication 10 ML: at 21:01

## 2021-01-01 RX ADMIN — Medication 100 MG: at 10:39

## 2021-01-01 RX ADMIN — DEXAMETHASONE SODIUM PHOSPHATE 4 MG: 4 INJECTION, SOLUTION INTRAMUSCULAR; INTRAVENOUS at 14:07

## 2021-01-01 RX ADMIN — FAMOTIDINE 20 MG: 10 INJECTION INTRAVENOUS at 09:00

## 2021-01-01 RX ADMIN — IPRATROPIUM BROMIDE AND ALBUTEROL SULFATE 3 ML: .5; 3 SOLUTION RESPIRATORY (INHALATION) at 14:21

## 2021-01-01 RX ADMIN — DEXTROSE MONOHYDRATE 125 ML/HR: 5 INJECTION, SOLUTION INTRAVENOUS at 12:09

## 2021-01-01 RX ADMIN — DEXTROSE MONOHYDRATE 125 ML/HR: 5 INJECTION, SOLUTION INTRAVENOUS at 04:52

## 2021-01-01 RX ADMIN — SODIUM CHLORIDE, PRESERVATIVE FREE 3 ML: 5 INJECTION INTRAVENOUS at 15:57

## 2021-01-01 RX ADMIN — HUMAN INSULIN 2 UNITS: 100 INJECTION, SOLUTION SUBCUTANEOUS at 11:06

## 2021-01-01 RX ADMIN — DEXAMETHASONE 4 MG: 4 TABLET ORAL at 21:14

## 2021-01-01 RX ADMIN — FAMOTIDINE 20 MG: 10 INJECTION INTRAVENOUS at 21:04

## 2021-01-01 RX ADMIN — INSULIN LISPRO 4 UNITS: 100 INJECTION, SOLUTION INTRAVENOUS; SUBCUTANEOUS at 11:40

## 2021-01-01 RX ADMIN — ASPIRIN 81 MG: 81 TABLET, CHEWABLE ORAL at 08:33

## 2021-01-01 RX ADMIN — INSULIN HUMAN 10 UNITS: 100 INJECTION, SOLUTION PARENTERAL at 12:33

## 2021-01-01 RX ADMIN — FAMOTIDINE 20 MG: 10 INJECTION INTRAVENOUS at 09:33

## 2021-01-01 RX ADMIN — IPRATROPIUM BROMIDE AND ALBUTEROL SULFATE 3 ML: .5; 3 SOLUTION RESPIRATORY (INHALATION) at 21:03

## 2021-01-01 RX ADMIN — IPRATROPIUM BROMIDE AND ALBUTEROL SULFATE 3 ML: .5; 3 SOLUTION RESPIRATORY (INHALATION) at 22:15

## 2021-01-01 RX ADMIN — Medication 300 UNITS: at 21:25

## 2021-01-01 RX ADMIN — BARIUM SULFATE 15 ML: 400 SUSPENSION ORAL at 14:00

## 2021-01-01 RX ADMIN — IPRATROPIUM BROMIDE AND ALBUTEROL SULFATE 3 ML: .5; 3 SOLUTION RESPIRATORY (INHALATION) at 13:10

## 2021-01-01 RX ADMIN — SODIUM CHLORIDE, SODIUM LACTATE, POTASSIUM CHLORIDE, AND CALCIUM CHLORIDE 100 ML/HR: 600; 310; 30; 20 INJECTION, SOLUTION INTRAVENOUS at 19:13

## 2021-01-01 RX ADMIN — SODIUM CHLORIDE, PRESERVATIVE FREE 3 ML: 5 INJECTION INTRAVENOUS at 06:21

## 2021-01-01 RX ADMIN — IPRATROPIUM BROMIDE AND ALBUTEROL SULFATE 3 ML: .5; 3 SOLUTION RESPIRATORY (INHALATION) at 13:49

## 2021-01-01 RX ADMIN — MORPHINE SULFATE 4 MG: 4 INJECTION INTRAVENOUS at 07:28

## 2021-01-01 RX ADMIN — SODIUM CHLORIDE 40 MG: 9 INJECTION, SOLUTION INTRAMUSCULAR; INTRAVENOUS; SUBCUTANEOUS at 16:04

## 2021-01-01 RX ADMIN — CARBOPLATIN 346 MG: 10 INJECTION, SOLUTION INTRAVENOUS at 11:41

## 2021-01-01 RX ADMIN — SODIUM CHLORIDE, PRESERVATIVE FREE 3 ML: 5 INJECTION INTRAVENOUS at 08:34

## 2021-01-01 RX ADMIN — SODIUM CHLORIDE, SODIUM LACTATE, POTASSIUM CHLORIDE, AND CALCIUM CHLORIDE 100 ML/HR: 600; 310; 30; 20 INJECTION, SOLUTION INTRAVENOUS at 05:12

## 2021-01-01 RX ADMIN — HALOPERIDOL LACTATE 2 MG: 5 INJECTION, SOLUTION INTRAMUSCULAR at 22:35

## 2021-01-01 RX ADMIN — Medication 10 ML: at 10:10

## 2021-01-01 RX ADMIN — SODIUM CHLORIDE, PRESERVATIVE FREE 10 ML: 5 INJECTION INTRAVENOUS at 20:25

## 2021-01-01 RX ADMIN — SODIUM CHLORIDE, PRESERVATIVE FREE 10 ML: 5 INJECTION INTRAVENOUS at 14:13

## 2021-01-01 RX ADMIN — IPRATROPIUM BROMIDE AND ALBUTEROL SULFATE 3 ML: .5; 3 SOLUTION RESPIRATORY (INHALATION) at 21:25

## 2021-01-01 RX ADMIN — SODIUM CHLORIDE 40 MG: 9 INJECTION, SOLUTION INTRAMUSCULAR; INTRAVENOUS; SUBCUTANEOUS at 05:17

## 2021-01-01 RX ADMIN — INSULIN LISPRO 1 UNITS: 100 INJECTION, SOLUTION INTRAVENOUS; SUBCUTANEOUS at 11:47

## 2021-01-01 RX ADMIN — SODIUM CHLORIDE, PRESERVATIVE FREE 10 ML: 5 INJECTION INTRAVENOUS at 21:25

## 2021-01-01 RX ADMIN — GADOTERATE MEGLUMINE 19 ML: 376.9 INJECTION INTRAVENOUS at 11:15

## 2021-01-01 RX ADMIN — PROPOFOL 10 MCG/KG/MIN: 10 INJECTION, EMULSION INTRAVENOUS at 05:05

## 2021-01-01 RX ADMIN — Medication 10 ML: at 10:58

## 2021-01-01 RX ADMIN — HUMAN INSULIN 2 UNITS: 100 INJECTION, SOLUTION SUBCUTANEOUS at 05:33

## 2021-01-01 RX ADMIN — IPRATROPIUM BROMIDE AND ALBUTEROL SULFATE 3 ML: .5; 3 SOLUTION RESPIRATORY (INHALATION) at 23:24

## 2021-01-01 RX ADMIN — IPRATROPIUM BROMIDE AND ALBUTEROL SULFATE 3 ML: .5; 3 SOLUTION RESPIRATORY (INHALATION) at 05:35

## 2021-01-01 RX ADMIN — Medication 300 UNITS: at 20:26

## 2021-01-01 RX ADMIN — ASPIRIN 81 MG: 81 TABLET, CHEWABLE ORAL at 09:08

## 2021-01-01 RX ADMIN — SODIUM CHLORIDE, PRESERVATIVE FREE 3 ML: 5 INJECTION INTRAVENOUS at 09:05

## 2021-01-01 RX ADMIN — PIPERACILLIN AND TAZOBACTAM 4.5 G: 4; .5 INJECTION, POWDER, FOR SOLUTION INTRAVENOUS at 05:34

## 2021-01-01 RX ADMIN — Medication 300 UNITS: at 10:08

## 2021-01-01 RX ADMIN — Medication 300 UNITS: at 22:53

## 2021-01-01 RX ADMIN — DEXAMETHASONE SODIUM PHOSPHATE 4 MG: 4 INJECTION, SOLUTION INTRA-ARTICULAR; INTRALESIONAL; INTRAMUSCULAR; INTRAVENOUS; SOFT TISSUE at 08:32

## 2021-01-01 RX ADMIN — LORAZEPAM 1 MG: 2 INJECTION INTRAMUSCULAR; INTRAVENOUS at 07:46

## 2021-01-01 RX ADMIN — Medication 10 ML: at 22:45

## 2021-01-01 RX ADMIN — GLYCOPYRROLATE 0.2 MG: 0.2 INJECTION INTRAMUSCULAR; INTRAVENOUS at 10:49

## 2021-01-01 RX ADMIN — GLYCOPYRROLATE 0.1 MG: 0.2 INJECTION, SOLUTION INTRAMUSCULAR; INTRAVENOUS at 16:06

## 2021-01-01 RX ADMIN — Medication 10 ML: at 12:19

## 2021-01-01 RX ADMIN — MORPHINE SULFATE 4 MG: 4 INJECTION INTRAVENOUS at 05:52

## 2021-01-01 RX ADMIN — HUMAN INSULIN 2 UNITS: 100 INJECTION, SOLUTION SUBCUTANEOUS at 12:00

## 2021-01-01 RX ADMIN — DEXAMETHASONE SODIUM PHOSPHATE 4 MG: 4 INJECTION, SOLUTION INTRAMUSCULAR; INTRAVENOUS at 16:56

## 2021-01-01 RX ADMIN — SODIUM CHLORIDE 25 ML/HR: 9 INJECTION, SOLUTION INTRAVENOUS at 10:00

## 2021-01-01 RX ADMIN — Medication 10 ML: at 14:00

## 2021-01-01 RX ADMIN — IPRATROPIUM BROMIDE AND ALBUTEROL SULFATE 3 ML: .5; 3 SOLUTION RESPIRATORY (INHALATION) at 05:26

## 2021-01-01 RX ADMIN — MORPHINE SULFATE 2 MG: 2 INJECTION, SOLUTION INTRAMUSCULAR; INTRAVENOUS at 19:56

## 2021-01-01 RX ADMIN — SODIUM CHLORIDE 800 MG: 9 INJECTION, SOLUTION INTRAVENOUS at 10:55

## 2021-01-01 RX ADMIN — SODIUM CHLORIDE, PRESERVATIVE FREE 3 ML: 5 INJECTION INTRAVENOUS at 20:34

## 2021-01-01 RX ADMIN — HEPARIN SODIUM (PORCINE) LOCK FLUSH IV SOLN 100 UNIT/ML 500 UNITS: 100 SOLUTION at 11:40

## 2021-01-01 RX ADMIN — PROPOFOL 10 MCG/KG/MIN: 10 INJECTION, EMULSION INTRAVENOUS at 11:21

## 2021-01-01 RX ADMIN — SODIUM CHLORIDE, PRESERVATIVE FREE 10 ML: 5 INJECTION INTRAVENOUS at 19:56

## 2021-01-01 RX ADMIN — SODIUM CHLORIDE, PRESERVATIVE FREE 10 ML: 5 INJECTION INTRAVENOUS at 08:20

## 2021-01-01 RX ADMIN — INSULIN LISPRO 4 UNITS: 100 INJECTION, SOLUTION INTRAVENOUS; SUBCUTANEOUS at 16:34

## 2021-01-01 RX ADMIN — Medication 10 ML: at 10:00

## 2021-01-01 RX ADMIN — SODIUM CHLORIDE, SODIUM LACTATE, POTASSIUM CHLORIDE, AND CALCIUM CHLORIDE 1000 ML: 600; 310; 30; 20 INJECTION, SOLUTION INTRAVENOUS at 13:56

## 2021-01-01 RX ADMIN — HUMAN INSULIN 6 UNITS: 100 INJECTION, SOLUTION SUBCUTANEOUS at 17:11

## 2021-01-01 RX ADMIN — SODIUM CHLORIDE 150 MG: 900 INJECTION, SOLUTION INTRAVENOUS at 10:48

## 2021-01-01 RX ADMIN — THIAMINE HYDROCHLORIDE 100 MG: 100 INJECTION, SOLUTION INTRAMUSCULAR; INTRAVENOUS at 09:12

## 2021-01-01 RX ADMIN — Medication 10 ML: at 05:18

## 2021-01-01 RX ADMIN — MORPHINE SULFATE 4 MG: 4 INJECTION INTRAVENOUS at 07:36

## 2021-01-01 RX ADMIN — Medication 10 ML: at 22:00

## 2021-01-01 RX ADMIN — CARBOPLATIN 454 MG: 10 INJECTION, SOLUTION INTRAVENOUS at 12:56

## 2021-01-01 RX ADMIN — INSULIN LISPRO 10 UNITS: 100 INJECTION, SOLUTION INTRAVENOUS; SUBCUTANEOUS at 12:12

## 2021-01-01 RX ADMIN — PIPERACILLIN AND TAZOBACTAM 4.5 G: 4; .5 INJECTION, POWDER, FOR SOLUTION INTRAVENOUS at 04:53

## 2021-01-01 RX ADMIN — Medication 300 UNITS: at 23:31

## 2021-01-01 RX ADMIN — Medication 300 UNITS: at 09:17

## 2021-01-01 RX ADMIN — Medication 2 G: at 10:34

## 2021-01-01 RX ADMIN — FENTANYL CITRATE 50 MCG: 50 INJECTION, SOLUTION INTRAMUSCULAR; INTRAVENOUS at 12:18

## 2021-01-01 RX ADMIN — ATORVASTATIN CALCIUM 40 MG: 40 TABLET, FILM COATED ORAL at 21:10

## 2021-01-01 RX ADMIN — Medication 300 UNITS: at 11:50

## 2021-01-01 RX ADMIN — SODIUM CHLORIDE, PRESERVATIVE FREE 10 ML: 5 INJECTION INTRAVENOUS at 10:48

## 2021-01-01 RX ADMIN — DEXTROSE MONOHYDRATE 75 ML/HR: 5 INJECTION, SOLUTION INTRAVENOUS at 13:58

## 2021-01-01 RX ADMIN — Medication 10 ML: at 14:10

## 2021-01-01 RX ADMIN — HUMAN INSULIN 4 UNITS: 100 INJECTION, SOLUTION SUBCUTANEOUS at 19:58

## 2021-01-01 RX ADMIN — POTASSIUM CHLORIDE 20 MEQ: 14.9 INJECTION, SOLUTION INTRAVENOUS at 08:16

## 2021-01-01 RX ADMIN — PIPERACILLIN AND TAZOBACTAM 4.5 G: 4; .5 INJECTION, POWDER, FOR SOLUTION INTRAVENOUS at 21:16

## 2021-01-01 RX ADMIN — SODIUM CHLORIDE, PRESERVATIVE FREE 3 ML: 5 INJECTION INTRAVENOUS at 21:05

## 2021-01-01 RX ADMIN — MORPHINE SULFATE 2 MG: 2 INJECTION, SOLUTION INTRAMUSCULAR; INTRAVENOUS at 05:16

## 2021-01-01 RX ADMIN — DEXTROSE MONOHYDRATE 75 ML/HR: 5 INJECTION, SOLUTION INTRAVENOUS at 10:54

## 2021-01-01 RX ADMIN — DEXAMETHASONE 4 MG: 4 TABLET ORAL at 13:31

## 2021-01-01 RX ADMIN — ATORVASTATIN CALCIUM 40 MG: 40 TABLET, FILM COATED ORAL at 21:20

## 2021-01-01 RX ADMIN — DEXAMETHASONE SODIUM PHOSPHATE 4 MG: 4 INJECTION, SOLUTION INTRA-ARTICULAR; INTRALESIONAL; INTRAMUSCULAR; INTRAVENOUS; SOFT TISSUE at 08:33

## 2021-01-01 RX ADMIN — ATORVASTATIN CALCIUM 40 MG: 40 TABLET, FILM COATED ORAL at 22:22

## 2021-01-01 RX ADMIN — ASPIRIN 81 MG: 81 TABLET, CHEWABLE ORAL at 09:12

## 2021-01-01 RX ADMIN — PIPERACILLIN AND TAZOBACTAM 4.5 G: 4; .5 INJECTION, POWDER, FOR SOLUTION INTRAVENOUS at 12:15

## 2021-01-01 RX ADMIN — HUMAN INSULIN 10 UNITS: 100 INJECTION, SOLUTION SUBCUTANEOUS at 12:33

## 2021-01-01 RX ADMIN — ONDANSETRON 4 MG: 2 INJECTION INTRAMUSCULAR; INTRAVENOUS at 10:38

## 2021-01-01 RX ADMIN — TUBERCULIN PURIFIED PROTEIN DERIVATIVE 5 UNITS: 5 INJECTION, SOLUTION INTRADERMAL at 18:49

## 2021-01-01 RX ADMIN — SODIUM CHLORIDE 200 MG: 9 INJECTION, SOLUTION INTRAVENOUS at 11:36

## 2021-01-01 RX ADMIN — LIDOCAINE HYDROCHLORIDE 40 MG: 20 INJECTION, SOLUTION EPIDURAL; INFILTRATION; INTRACAUDAL; PERINEURAL at 10:38

## 2021-01-01 RX ADMIN — MORPHINE SULFATE 4 MG: 4 INJECTION INTRAVENOUS at 22:02

## 2021-01-01 RX ADMIN — Medication 10 ML: at 21:07

## 2021-01-01 RX ADMIN — Medication 10 ML: at 05:50

## 2021-01-01 RX ADMIN — MORPHINE SULFATE 4 MG: 4 INJECTION INTRAVENOUS at 19:53

## 2021-01-01 RX ADMIN — HUMAN INSULIN 4 UNITS: 100 INJECTION, SOLUTION SUBCUTANEOUS at 23:49

## 2021-01-01 RX ADMIN — HYDROMORPHONE HYDROCHLORIDE 1 MG: 1 INJECTION, SOLUTION INTRAMUSCULAR; INTRAVENOUS; SUBCUTANEOUS at 23:32

## 2021-01-01 RX ADMIN — SODIUM CHLORIDE, PRESERVATIVE FREE 3 ML: 5 INJECTION INTRAVENOUS at 08:33

## 2021-01-01 RX ADMIN — BARIUM SULFATE 45 ML: 980 POWDER, FOR SUSPENSION ORAL at 13:59

## 2021-01-01 RX ADMIN — HUMAN INSULIN 4 UNITS: 100 INJECTION, SOLUTION SUBCUTANEOUS at 05:12

## 2021-01-01 RX ADMIN — ONDANSETRON 8 MG: 2 INJECTION INTRAMUSCULAR; INTRAVENOUS at 10:25

## 2021-01-01 RX ADMIN — FENTANYL CITRATE 50 MCG/HR: 50 INJECTION, SOLUTION INTRAMUSCULAR; INTRAVENOUS at 17:26

## 2021-01-01 RX ADMIN — SODIUM CHLORIDE, PRESERVATIVE FREE 10 ML: 5 INJECTION INTRAVENOUS at 07:22

## 2021-01-01 RX ADMIN — INSULIN LISPRO 2 UNITS: 100 INJECTION, SOLUTION INTRAVENOUS; SUBCUTANEOUS at 16:30

## 2021-01-01 RX ADMIN — HUMAN INSULIN 2 UNITS: 100 INJECTION, SOLUTION SUBCUTANEOUS at 23:33

## 2021-01-01 RX ADMIN — HUMAN INSULIN 6 UNITS: 100 INJECTION, SOLUTION SUBCUTANEOUS at 18:06

## 2021-01-01 RX ADMIN — DEXAMETHASONE SODIUM PHOSPHATE 4 MG: 4 INJECTION, SOLUTION INTRAMUSCULAR; INTRAVENOUS at 21:38

## 2021-01-01 RX ADMIN — MORPHINE SULFATE 4 MG: 4 INJECTION INTRAVENOUS at 15:29

## 2021-01-01 RX ADMIN — HUMAN INSULIN 6 UNITS: 100 INJECTION, SOLUTION SUBCUTANEOUS at 23:30

## 2021-01-01 RX ADMIN — DEXAMETHASONE SODIUM PHOSPHATE 4 MG: 4 INJECTION, SOLUTION INTRA-ARTICULAR; INTRALESIONAL; INTRAMUSCULAR; INTRAVENOUS; SOFT TISSUE at 20:34

## 2021-01-01 RX ADMIN — SODIUM CHLORIDE, PRESERVATIVE FREE 3 ML: 5 INJECTION INTRAVENOUS at 10:11

## 2021-01-01 RX ADMIN — Medication 5 ML: at 16:36

## 2021-01-01 RX ADMIN — DEXAMETHASONE SODIUM PHOSPHATE 12 MG: 4 INJECTION, SOLUTION INTRAMUSCULAR; INTRAVENOUS at 10:30

## 2021-01-01 RX ADMIN — HUMAN INSULIN 4 UNITS: 100 INJECTION, SOLUTION SUBCUTANEOUS at 00:20

## 2021-01-01 RX ADMIN — LORAZEPAM 1 MG: 2 INJECTION INTRAMUSCULAR; INTRAVENOUS at 15:29

## 2021-01-01 RX ADMIN — HALOPERIDOL LACTATE 2 MG: 5 INJECTION, SOLUTION INTRAMUSCULAR at 11:22

## 2021-01-01 RX ADMIN — FAMOTIDINE 20 MG: 10 INJECTION INTRAVENOUS at 21:16

## 2021-01-01 RX ADMIN — SODIUM CHLORIDE, PRESERVATIVE FREE 10 ML: 5 INJECTION INTRAVENOUS at 07:29

## 2021-01-01 RX ADMIN — ASPIRIN 81 MG: 81 TABLET, CHEWABLE ORAL at 09:03

## 2021-01-01 RX ADMIN — SODIUM CHLORIDE, PRESERVATIVE FREE 10 ML: 5 INJECTION INTRAVENOUS at 07:36

## 2021-01-01 RX ADMIN — PIPERACILLIN AND TAZOBACTAM 4.5 G: 4; .5 INJECTION, POWDER, FOR SOLUTION INTRAVENOUS at 05:33

## 2021-01-01 RX ADMIN — Medication 10 ML: at 21:38

## 2021-01-01 RX ADMIN — DEXAMETHASONE SODIUM PHOSPHATE 4 MG: 4 INJECTION, SOLUTION INTRA-ARTICULAR; INTRALESIONAL; INTRAMUSCULAR; INTRAVENOUS; SOFT TISSUE at 15:06

## 2021-01-01 RX ADMIN — FOLIC ACID 1 MG: 1 TABLET ORAL at 09:03

## 2021-01-01 RX ADMIN — PIPERACILLIN AND TAZOBACTAM 4.5 G: 4; .5 INJECTION, POWDER, FOR SOLUTION INTRAVENOUS at 05:06

## 2021-01-01 RX ADMIN — SODIUM CHLORIDE, PRESERVATIVE FREE 10 ML: 5 INJECTION INTRAVENOUS at 07:46

## 2021-01-01 RX ADMIN — SODIUM CHLORIDE, PRESERVATIVE FREE 3 ML: 5 INJECTION INTRAVENOUS at 03:34

## 2021-01-01 RX ADMIN — ONDANSETRON 8 MG: 2 INJECTION INTRAMUSCULAR; INTRAVENOUS at 10:06

## 2021-01-01 RX ADMIN — DEXTROSE MONOHYDRATE 100 ML/HR: 5 INJECTION, SOLUTION INTRAVENOUS at 02:44

## 2021-01-01 RX ADMIN — MULTIPLE VITAMINS W/ MINERALS TAB 1 TABLET: TAB at 09:04

## 2021-01-01 RX ADMIN — Medication 10 ML: at 13:38

## 2021-01-01 RX ADMIN — MULTIPLE VITAMINS W/ MINERALS TAB 1 TABLET: TAB at 08:33

## 2021-01-01 RX ADMIN — Medication 10 ML: at 18:30

## 2021-01-01 RX ADMIN — IPRATROPIUM BROMIDE AND ALBUTEROL SULFATE 3 ML: .5; 3 SOLUTION RESPIRATORY (INHALATION) at 07:28

## 2021-01-01 RX ADMIN — HUMAN INSULIN 2 UNITS: 100 INJECTION, SOLUTION SUBCUTANEOUS at 06:35

## 2021-01-01 RX ADMIN — MIDAZOLAM 1 MG: 1 INJECTION INTRAMUSCULAR; INTRAVENOUS at 12:18

## 2021-01-01 RX ADMIN — Medication 10 ML: at 06:00

## 2021-01-01 RX ADMIN — SODIUM CHLORIDE, PRESERVATIVE FREE 3 ML: 5 INJECTION INTRAVENOUS at 20:12

## 2021-01-01 RX ADMIN — SODIUM CHLORIDE, PRESERVATIVE FREE 10 ML: 5 INJECTION INTRAVENOUS at 09:04

## 2021-01-01 RX ADMIN — Medication 10 ML: at 04:18

## 2021-01-01 RX ADMIN — FAMOTIDINE 20 MG: 10 INJECTION INTRAVENOUS at 22:45

## 2021-01-01 RX ADMIN — Medication 10 ML: at 05:34

## 2021-01-01 RX ADMIN — MORPHINE SULFATE 4 MG: 4 INJECTION INTRAVENOUS at 19:55

## 2021-01-01 RX ADMIN — HALOPERIDOL LACTATE 2 MG: 5 INJECTION, SOLUTION INTRAMUSCULAR at 11:49

## 2021-01-01 RX ADMIN — MORPHINE SULFATE 4 MG: 4 INJECTION INTRAVENOUS at 14:12

## 2021-01-01 RX ADMIN — PROPOFOL 100 MCG/KG/MIN: 10 INJECTION, EMULSION INTRAVENOUS at 10:38

## 2021-01-01 RX ADMIN — Medication 10 ML: at 05:38

## 2021-01-01 RX ADMIN — PERFLUTREN 1 ML: 6.52 INJECTION, SUSPENSION INTRAVENOUS at 08:50

## 2021-01-01 RX ADMIN — HUMAN INSULIN 4 UNITS: 100 INJECTION, SOLUTION SUBCUTANEOUS at 14:06

## 2021-01-01 RX ADMIN — POTASSIUM CHLORIDE 20 MEQ: 14.9 INJECTION, SOLUTION INTRAVENOUS at 10:34

## 2021-01-01 RX ADMIN — DEXAMETHASONE SODIUM PHOSPHATE 4 MG: 4 INJECTION, SOLUTION INTRA-ARTICULAR; INTRALESIONAL; INTRAMUSCULAR; INTRAVENOUS; SOFT TISSUE at 20:11

## 2021-01-01 RX ADMIN — DEXTROSE MONOHYDRATE 125 ML/HR: 5 INJECTION, SOLUTION INTRAVENOUS at 20:36

## 2021-01-01 RX ADMIN — SODIUM CHLORIDE, SODIUM LACTATE, POTASSIUM CHLORIDE, AND CALCIUM CHLORIDE: 600; 310; 30; 20 INJECTION, SOLUTION INTRAVENOUS at 10:31

## 2021-01-01 RX ADMIN — IPRATROPIUM BROMIDE AND ALBUTEROL SULFATE 3 ML: .5; 3 SOLUTION RESPIRATORY (INHALATION) at 13:52

## 2021-01-01 RX ADMIN — MORPHINE SULFATE 4 MG: 4 INJECTION INTRAVENOUS at 15:56

## 2021-01-01 RX ADMIN — DIPHENHYDRAMINE HYDROCHLORIDE 50 MG: 50 INJECTION, SOLUTION INTRAMUSCULAR; INTRAVENOUS at 12:18

## 2021-01-01 RX ADMIN — SODIUM CHLORIDE, PRESERVATIVE FREE 10 ML: 5 INJECTION INTRAVENOUS at 22:53

## 2021-01-01 RX ADMIN — FAMOTIDINE 20 MG: 10 INJECTION INTRAVENOUS at 09:27

## 2021-01-01 RX ADMIN — LORAZEPAM 1 MG: 2 INJECTION INTRAMUSCULAR; INTRAVENOUS at 09:17

## 2021-01-01 RX ADMIN — DEXTROSE MONOHYDRATE 75 ML/HR: 5 INJECTION, SOLUTION INTRAVENOUS at 02:05

## 2021-01-01 RX ADMIN — INSULIN LISPRO 2 UNITS: 100 INJECTION, SOLUTION INTRAVENOUS; SUBCUTANEOUS at 09:08

## 2021-01-01 RX ADMIN — PIPERACILLIN AND TAZOBACTAM 4.5 G: 4; .5 INJECTION, POWDER, FOR SOLUTION INTRAVENOUS at 14:47

## 2021-01-01 RX ADMIN — ATORVASTATIN CALCIUM 40 MG: 40 TABLET, FILM COATED ORAL at 21:01

## 2021-01-01 RX ADMIN — GLYCOPYRROLATE 0.2 MG: 0.2 INJECTION INTRAMUSCULAR; INTRAVENOUS at 19:54

## 2021-01-01 RX ADMIN — Medication 10 ML: at 05:04

## 2021-01-01 RX ADMIN — DEXAMETHASONE SODIUM PHOSPHATE 4 MG: 4 INJECTION, SOLUTION INTRAMUSCULAR; INTRAVENOUS at 08:50

## 2021-01-01 RX ADMIN — MORPHINE SULFATE 4 MG: 4 INJECTION INTRAVENOUS at 10:49

## 2021-01-01 RX ADMIN — HYOSCYAMINE SULFATE 0.12 MG: 0.12 TABLET ORAL; SUBLINGUAL at 13:10

## 2021-01-01 RX ADMIN — MORPHINE SULFATE 4 MG: 4 INJECTION INTRAVENOUS at 13:23

## 2021-01-01 RX ADMIN — PROPOFOL 10 MCG/KG/MIN: 10 INJECTION, EMULSION INTRAVENOUS at 17:26

## 2021-01-01 RX ADMIN — DEXTROSE MONOHYDRATE 100 ML/HR: 5 INJECTION, SOLUTION INTRAVENOUS at 01:42

## 2021-01-01 RX ADMIN — FOLIC ACID 1 MG: 1 TABLET ORAL at 08:33

## 2021-01-01 RX ADMIN — DEXAMETHASONE SODIUM PHOSPHATE 4 MG: 4 INJECTION, SOLUTION INTRAMUSCULAR; INTRAVENOUS at 09:05

## 2021-01-01 RX ADMIN — BARIUM SULFATE 15 ML: 400 PASTE ORAL at 13:59

## 2021-01-01 RX ADMIN — SODIUM CHLORIDE 150 MG: 900 INJECTION, SOLUTION INTRAVENOUS at 10:27

## 2021-01-01 RX ADMIN — HALOPERIDOL LACTATE 2 MG: 5 INJECTION, SOLUTION INTRAMUSCULAR at 14:12

## 2021-01-01 RX ADMIN — MORPHINE SULFATE 4 MG: 4 INJECTION INTRAVENOUS at 23:49

## 2021-01-01 RX ADMIN — INSULIN LISPRO 2 UNITS: 100 INJECTION, SOLUTION INTRAVENOUS; SUBCUTANEOUS at 09:03

## 2021-01-01 RX ADMIN — Medication 10 ML: at 13:37

## 2021-01-01 RX ADMIN — Medication 10 ML: at 05:05

## 2021-01-01 RX ADMIN — MORPHINE SULFATE 4 MG: 4 INJECTION INTRAVENOUS at 11:23

## 2021-01-01 RX ADMIN — MORPHINE SULFATE 4 MG: 4 INJECTION INTRAVENOUS at 09:04

## 2021-01-01 RX ADMIN — IPRATROPIUM BROMIDE AND ALBUTEROL SULFATE 3 ML: .5; 3 SOLUTION RESPIRATORY (INHALATION) at 21:26

## 2021-01-01 RX ADMIN — PIPERACILLIN AND TAZOBACTAM 4.5 G: 4; .5 INJECTION, POWDER, FOR SOLUTION INTRAVENOUS at 13:00

## 2021-01-01 RX ADMIN — AMLODIPINE BESYLATE 5 MG: 5 TABLET ORAL at 09:33

## 2021-01-01 RX ADMIN — BARIUM SULFATE 15 ML: 400 PASTE ORAL at 13:49

## 2021-01-01 RX ADMIN — PIPERACILLIN AND TAZOBACTAM 4.5 G: 4; .5 INJECTION, POWDER, FOR SOLUTION INTRAVENOUS at 21:06

## 2021-01-01 RX ADMIN — ASPIRIN 324 MG: 81 TABLET, CHEWABLE ORAL at 13:04

## 2021-01-01 RX ADMIN — AMLODIPINE BESYLATE 5 MG: 5 TABLET ORAL at 09:26

## 2021-01-01 RX ADMIN — ENOXAPARIN SODIUM 40 MG: 100 INJECTION SUBCUTANEOUS at 17:00

## 2021-01-01 RX ADMIN — IPRATROPIUM BROMIDE AND ALBUTEROL SULFATE 3 ML: .5; 3 SOLUTION RESPIRATORY (INHALATION) at 13:24

## 2021-01-01 RX ADMIN — DEXAMETHASONE 4 MG: 4 TABLET ORAL at 21:16

## 2021-01-01 RX ADMIN — PIPERACILLIN AND TAZOBACTAM 4.5 G: 4; .5 INJECTION, POWDER, FOR SOLUTION INTRAVENOUS at 21:09

## 2021-01-01 RX ADMIN — SODIUM CHLORIDE, PRESERVATIVE FREE 3 ML: 5 INJECTION INTRAVENOUS at 21:06

## 2021-01-01 RX ADMIN — IPRATROPIUM BROMIDE AND ALBUTEROL SULFATE 3 ML: .5; 3 SOLUTION RESPIRATORY (INHALATION) at 13:42

## 2021-01-01 RX ADMIN — HEPARIN SODIUM 5000 UNITS: 5000 INJECTION INTRAVENOUS; SUBCUTANEOUS at 14:37

## 2021-01-01 RX ADMIN — SODIUM CHLORIDE, PRESERVATIVE FREE 10 ML: 5 INJECTION INTRAVENOUS at 21:03

## 2021-01-01 RX ADMIN — DEXAMETHASONE SODIUM PHOSPHATE 4 MG: 4 INJECTION, SOLUTION INTRAMUSCULAR; INTRAVENOUS at 05:34

## 2021-01-01 RX ADMIN — IPRATROPIUM BROMIDE AND ALBUTEROL SULFATE 3 ML: .5; 3 SOLUTION RESPIRATORY (INHALATION) at 21:05

## 2021-01-01 RX ADMIN — DEXAMETHASONE SODIUM PHOSPHATE 4 MG: 4 INJECTION, SOLUTION INTRAMUSCULAR; INTRAVENOUS at 06:36

## 2021-01-01 RX ADMIN — MULTIPLE VITAMINS W/ MINERALS TAB 1 TABLET: TAB at 10:39

## 2021-01-01 RX ADMIN — DEXAMETHASONE SODIUM PHOSPHATE 4 MG: 4 INJECTION, SOLUTION INTRAMUSCULAR; INTRAVENOUS at 16:47

## 2021-01-01 RX ADMIN — IPRATROPIUM BROMIDE AND ALBUTEROL SULFATE 3 ML: .5; 3 SOLUTION RESPIRATORY (INHALATION) at 05:18

## 2021-01-01 RX ADMIN — Medication 10 ML: at 21:20

## 2021-01-01 RX ADMIN — Medication 300 UNITS: at 11:23

## 2021-01-01 RX ADMIN — Medication 10 ML: at 22:22

## 2021-01-01 RX ADMIN — Medication 10 ML: at 11:15

## 2021-01-01 RX ADMIN — SODIUM CHLORIDE, PRESERVATIVE FREE 10 ML: 5 INJECTION INTRAVENOUS at 11:23

## 2021-01-01 RX ADMIN — Medication 100 MG: at 08:33

## 2021-01-01 RX ADMIN — BARIUM SULFATE 45 ML: 980 POWDER, FOR SUSPENSION ORAL at 13:48

## 2021-01-01 RX ADMIN — Medication 10 ML: at 21:10

## 2021-01-01 RX ADMIN — DIATRIZOATE MEGLUMINE AND DIATRIZOATE SODIUM 10 ML: 660; 100 LIQUID ORAL; RECTAL at 10:58

## 2021-01-01 RX ADMIN — GLYCOPYRROLATE 0.2 MG: 0.2 INJECTION INTRAMUSCULAR; INTRAVENOUS at 23:32

## 2021-01-01 RX ADMIN — MORPHINE SULFATE 2 MG: 2 INJECTION, SOLUTION INTRAMUSCULAR; INTRAVENOUS at 12:15

## 2021-01-01 RX ADMIN — IPRATROPIUM BROMIDE AND ALBUTEROL SULFATE 3 ML: .5; 3 SOLUTION RESPIRATORY (INHALATION) at 06:05

## 2021-01-01 RX ADMIN — Medication 300 UNITS: at 19:54

## 2021-01-01 RX ADMIN — DEXAMETHASONE 4 MG: 4 TABLET ORAL at 14:21

## 2021-01-01 RX ADMIN — HYDROMORPHONE HYDROCHLORIDE 1 MG: 1 INJECTION, SOLUTION INTRAMUSCULAR; INTRAVENOUS; SUBCUTANEOUS at 07:21

## 2021-01-01 RX ADMIN — Medication 300 UNITS: at 21:03

## 2021-01-01 RX ADMIN — MORPHINE SULFATE 2 MG: 2 INJECTION, SOLUTION INTRAMUSCULAR; INTRAVENOUS at 16:06

## 2021-01-01 RX ADMIN — MORPHINE SULFATE 2 MG: 2 INJECTION, SOLUTION INTRAMUSCULAR; INTRAVENOUS at 02:30

## 2021-01-01 RX ADMIN — PROPOFOL 10 MCG/KG/MIN: 10 INJECTION, EMULSION INTRAVENOUS at 22:20

## 2021-01-01 RX ADMIN — SODIUM CHLORIDE, PRESERVATIVE FREE 10 ML: 5 INJECTION INTRAVENOUS at 14:03

## 2021-01-01 RX ADMIN — VANCOMYCIN HYDROCHLORIDE 2000 MG: 10 INJECTION, POWDER, LYOPHILIZED, FOR SOLUTION INTRAVENOUS at 14:10

## 2021-01-01 RX ADMIN — Medication 5 ML: at 13:49

## 2021-01-01 RX ADMIN — SODIUM CHLORIDE, PRESERVATIVE FREE 10 ML: 5 INJECTION INTRAVENOUS at 16:36

## 2021-01-01 RX ADMIN — HYDROMORPHONE HYDROCHLORIDE 1 MG: 1 INJECTION, SOLUTION INTRAMUSCULAR; INTRAVENOUS; SUBCUTANEOUS at 16:36

## 2021-01-01 RX ADMIN — ENOXAPARIN SODIUM 40 MG: 100 INJECTION SUBCUTANEOUS at 16:46

## 2021-01-01 RX ADMIN — INSULIN LISPRO 6 UNITS: 100 INJECTION, SOLUTION INTRAVENOUS; SUBCUTANEOUS at 16:11

## 2021-01-01 RX ADMIN — IPRATROPIUM BROMIDE AND ALBUTEROL SULFATE 3 ML: .5; 3 SOLUTION RESPIRATORY (INHALATION) at 05:23

## 2021-01-01 RX ADMIN — Medication 5 ML: at 11:04

## 2021-01-01 RX ADMIN — SODIUM CHLORIDE, PRESERVATIVE FREE 10 ML: 5 INJECTION INTRAVENOUS at 13:23

## 2021-01-01 RX ADMIN — ENOXAPARIN SODIUM 40 MG: 100 INJECTION SUBCUTANEOUS at 18:49

## 2021-01-01 RX ADMIN — SODIUM CHLORIDE, PRESERVATIVE FREE 10 ML: 5 INJECTION INTRAVENOUS at 23:32

## 2021-01-01 RX ADMIN — ENOXAPARIN SODIUM 40 MG: 100 INJECTION SUBCUTANEOUS at 16:11

## 2021-01-01 RX ADMIN — THYROID, PORCINE 150 MG: 60 TABLET ORAL at 09:25

## 2021-01-01 RX ADMIN — PIPERACILLIN AND TAZOBACTAM 4.5 G: 4; .5 INJECTION, POWDER, FOR SOLUTION INTRAVENOUS at 22:42

## 2021-01-01 RX ADMIN — GLYCOPYRROLATE 0.2 MG: 0.2 INJECTION INTRAMUSCULAR; INTRAVENOUS at 03:34

## 2021-01-01 RX ADMIN — SODIUM CHLORIDE, SODIUM LACTATE, POTASSIUM CHLORIDE, AND CALCIUM CHLORIDE 100 ML/HR: 600; 310; 30; 20 INJECTION, SOLUTION INTRAVENOUS at 07:28

## 2021-01-01 RX ADMIN — Medication 300 UNITS: at 10:48

## 2021-01-01 RX ADMIN — DEXTROSE MONOHYDRATE 75 ML/HR: 5 INJECTION, SOLUTION INTRAVENOUS at 21:09

## 2021-01-01 RX ADMIN — SODIUM CHLORIDE, PRESERVATIVE FREE 10 ML: 5 INJECTION INTRAVENOUS at 19:54

## 2021-01-01 RX ADMIN — PIPERACILLIN AND TAZOBACTAM 4.5 G: 4; .5 INJECTION, POWDER, FOR SOLUTION INTRAVENOUS at 14:08

## 2021-01-01 RX ADMIN — MORPHINE SULFATE 4 MG: 4 INJECTION INTRAVENOUS at 11:50

## 2021-01-01 RX ADMIN — INSULIN HUMAN 15 UNITS: 100 INJECTION, SOLUTION PARENTERAL at 09:36

## 2021-01-01 RX ADMIN — PROPOFOL 10 MCG/KG/MIN: 10 INJECTION, EMULSION INTRAVENOUS at 13:56

## 2021-01-01 RX ADMIN — GADOTERATE MEGLUMINE 19 ML: 376.9 INJECTION INTRAVENOUS at 18:30

## 2021-01-01 RX ADMIN — MIDAZOLAM 1 MG: 1 INJECTION INTRAMUSCULAR; INTRAVENOUS at 12:27

## 2021-01-01 RX ADMIN — Medication 10 ML: at 14:20

## 2021-01-01 RX ADMIN — HEPARIN SODIUM 5000 UNITS: 5000 INJECTION INTRAVENOUS; SUBCUTANEOUS at 00:56

## 2021-01-01 RX ADMIN — HYDRALAZINE HYDROCHLORIDE 50 MG: 50 TABLET, FILM COATED ORAL at 01:03

## 2021-01-01 RX ADMIN — LIDOCAINE HYDROCHLORIDE AND EPINEPHRINE 10 MG: 10; 10 INJECTION, SOLUTION INFILTRATION; PERINEURAL at 11:41

## 2021-01-01 RX ADMIN — SODIUM CHLORIDE, PRESERVATIVE FREE 10 ML: 5 INJECTION INTRAVENOUS at 11:50

## 2021-01-01 RX ADMIN — IPRATROPIUM BROMIDE AND ALBUTEROL SULFATE 3 ML: .5; 3 SOLUTION RESPIRATORY (INHALATION) at 22:52

## 2021-01-01 RX ADMIN — SODIUM CHLORIDE, PRESERVATIVE FREE 3 ML: 5 INJECTION INTRAVENOUS at 21:26

## 2021-01-01 RX ADMIN — IPRATROPIUM BROMIDE AND ALBUTEROL SULFATE 3 ML: .5; 3 SOLUTION RESPIRATORY (INHALATION) at 07:36

## 2021-01-01 RX ADMIN — MORPHINE SULFATE 4 MG: 4 INJECTION INTRAVENOUS at 22:35

## 2021-01-01 RX ADMIN — HUMAN INSULIN 6 UNITS: 100 INJECTION, SOLUTION SUBCUTANEOUS at 05:03

## 2021-01-01 RX ADMIN — INSULIN LISPRO 2 UNITS: 100 INJECTION, SOLUTION INTRAVENOUS; SUBCUTANEOUS at 07:25

## 2021-01-01 RX ADMIN — PIPERACILLIN AND TAZOBACTAM 4.5 G: 4; .5 INJECTION, POWDER, FOR SOLUTION INTRAVENOUS at 21:35

## 2021-01-01 RX ADMIN — SODIUM CHLORIDE, PRESERVATIVE FREE 10 ML: 5 INJECTION INTRAVENOUS at 23:24

## 2021-01-01 RX ADMIN — IPRATROPIUM BROMIDE AND ALBUTEROL SULFATE 3 ML: .5; 3 SOLUTION RESPIRATORY (INHALATION) at 15:57

## 2021-01-01 RX ADMIN — SODIUM CHLORIDE, PRESERVATIVE FREE 3 ML: 5 INJECTION INTRAVENOUS at 15:07

## 2021-01-01 RX ADMIN — PROPOFOL 20 MG: 10 INJECTION, EMULSION INTRAVENOUS at 10:38

## 2021-01-01 RX ADMIN — DEXAMETHASONE SODIUM PHOSPHATE 4 MG: 4 INJECTION, SOLUTION INTRA-ARTICULAR; INTRALESIONAL; INTRAMUSCULAR; INTRAVENOUS; SOFT TISSUE at 21:06

## 2021-01-01 RX ADMIN — PIPERACILLIN AND TAZOBACTAM 4.5 G: 4; .5 INJECTION, POWDER, FOR SOLUTION INTRAVENOUS at 12:35

## 2021-01-01 RX ADMIN — SODIUM CHLORIDE 800 MG: 9 INJECTION, SOLUTION INTRAVENOUS at 12:15

## 2021-01-01 RX ADMIN — Medication 10 ML: at 21:23

## 2021-01-01 RX ADMIN — SODIUM CHLORIDE, SODIUM LACTATE, POTASSIUM CHLORIDE, AND CALCIUM CHLORIDE 1000 ML: 600; 310; 30; 20 INJECTION, SOLUTION INTRAVENOUS at 14:48

## 2021-01-01 RX ADMIN — Medication 300 UNITS: at 08:20

## 2021-01-01 RX ADMIN — HEPARIN SODIUM 5000 UNITS: 5000 INJECTION INTRAVENOUS; SUBCUTANEOUS at 00:02

## 2021-01-01 RX ADMIN — SODIUM CHLORIDE, PRESERVATIVE FREE 10 ML: 5 INJECTION INTRAVENOUS at 10:08

## 2021-01-01 RX ADMIN — MORPHINE SULFATE 2 MG: 2 INJECTION, SOLUTION INTRAMUSCULAR; INTRAVENOUS at 10:05

## 2021-01-01 RX ADMIN — THYROID, PORCINE 150 MG: 60 TABLET ORAL at 09:33

## 2021-01-01 RX ADMIN — DEXTROSE MONOHYDRATE 100 ML/HR: 5 INJECTION, SOLUTION INTRAVENOUS at 14:21

## 2021-01-01 RX ADMIN — MORPHINE SULFATE 4 MG: 4 INJECTION INTRAVENOUS at 06:21

## 2021-01-01 RX ADMIN — DEXAMETHASONE 4 MG: 4 TABLET ORAL at 05:14

## 2021-01-01 RX ADMIN — HUMAN INSULIN 2 UNITS: 100 INJECTION, SOLUTION SUBCUTANEOUS at 11:52

## 2021-01-01 RX ADMIN — FOLIC ACID 1 MG: 1 TABLET ORAL at 10:39

## 2021-01-01 RX ADMIN — FOLIC ACID 1 MG: 1 TABLET ORAL at 09:08

## 2021-01-01 RX ADMIN — SODIUM CHLORIDE, PRESERVATIVE FREE 10 ML: 5 INJECTION INTRAVENOUS at 09:17

## 2021-01-01 RX ADMIN — FENTANYL CITRATE 50 MCG: 50 INJECTION, SOLUTION INTRAMUSCULAR; INTRAVENOUS at 12:27

## 2021-01-01 RX ADMIN — SODIUM CHLORIDE 500 ML: 900 INJECTION, SOLUTION INTRAVENOUS at 12:18

## 2021-01-01 RX ADMIN — DEXAMETHASONE SODIUM PHOSPHATE 4 MG: 4 INJECTION, SOLUTION INTRAMUSCULAR; INTRAVENOUS at 22:47

## 2021-01-01 RX ADMIN — DEXAMETHASONE 4 MG: 4 TABLET ORAL at 05:39

## 2021-01-01 RX ADMIN — FAMOTIDINE 20 MG: 10 INJECTION INTRAVENOUS at 08:15

## 2021-01-01 RX ADMIN — Medication 10 ML: at 05:25

## 2021-01-01 RX ADMIN — DEXAMETHASONE SODIUM PHOSPHATE 12 MG: 4 INJECTION, SOLUTION INTRAMUSCULAR; INTRAVENOUS at 10:09

## 2021-01-01 RX ADMIN — ENOXAPARIN SODIUM 40 MG: 100 INJECTION SUBCUTANEOUS at 16:34

## 2021-01-01 RX ADMIN — Medication 100 MG: at 09:03

## 2021-01-01 RX ADMIN — Medication 100 MG: at 09:08

## 2021-01-01 RX ADMIN — SODIUM CHLORIDE 25 ML/HR: 9 INJECTION, SOLUTION INTRAVENOUS at 10:10

## 2021-01-01 RX ADMIN — SODIUM CHLORIDE, PRESERVATIVE FREE 3 ML: 5 INJECTION INTRAVENOUS at 08:51

## 2021-01-01 RX ADMIN — Medication 10 ML: at 13:31

## 2021-01-01 RX ADMIN — MULTIPLE VITAMINS W/ MINERALS TAB 1 TABLET: TAB at 09:08

## 2021-01-01 RX ADMIN — HEPARIN SODIUM 5000 UNITS: 5000 INJECTION INTRAVENOUS; SUBCUTANEOUS at 12:35

## 2021-01-01 RX ADMIN — LIDOCAINE HYDROCHLORIDE 160 MG: 20 INJECTION, SOLUTION INFILTRATION; PERINEURAL at 12:32

## 2021-01-01 RX ADMIN — SODIUM CHLORIDE, PRESERVATIVE FREE 10 ML: 5 INJECTION INTRAVENOUS at 05:53

## 2021-01-01 RX ADMIN — Medication 5 ML: at 22:22

## 2021-01-01 RX ADMIN — MORPHINE SULFATE 4 MG: 4 INJECTION INTRAVENOUS at 14:03

## 2021-01-01 RX ADMIN — DEXAMETHASONE SODIUM PHOSPHATE 4 MG: 4 INJECTION, SOLUTION INTRA-ARTICULAR; INTRALESIONAL; INTRAMUSCULAR; INTRAVENOUS; SOFT TISSUE at 10:09

## 2021-01-01 RX ADMIN — Medication 5 ML: at 17:00

## 2021-01-04 NOTE — PROGRESS NOTES
Patient called today and stated he was reading over his paperwork from our office and felt his left leg was swollen. Spoke with Vidhi and we will order left leg U/S today.

## 2021-01-06 NOTE — PROGRESS NOTES
I have contacted [facility] regarding repeat thoracentesis. Pt voices understanding of plan.

## 2021-01-07 NOTE — PROGRESS NOTES
Recovery period without difficulty. Pt alert and oriented and denies pain. Dressing is clean, dry, and intact. Reviewed discharge instructions with patient and wife, both verbalized understanding. [facility] Vital signs and Nabila score completed.

## 2021-01-07 NOTE — PROCEDURES
Department of Interventional Radiology  [phone]        Interventional Radiology Brief Procedure Note    Patient: Tyler Wadsworth MRN: [id number]  SSN: [id number]    YOB: 1940  Age: 68 y.o.   Sex: male      Date of Procedure: 1/7/2021    Pre-Procedure Diagnosis: lung cancer    Post-Procedure Diagnosis: SAME    Procedure(s): Venous Chest Port Placement    Brief Description of Procedure: US, fluoro guided left IJ venous chest port placed    Performed By: Leyla Lucio PA-C     Assistants: None    Anesthesia:TIVS/MAC    Estimated Blood Loss: Less than 10ml    Specimens:  None    Implants:  Chest Port Placement    Findings: catheter tip in right atrium     Complications: None    Recommendations: ok to use port     Follow Up: prn    Signed By: Leyla Lucio PA-C     January 7, 2021

## 2021-01-07 NOTE — H&P
Date of Surgery Update: 
Tiffany Reece was seen and examined. History and physical has been reviewed. The patient has been examined.  There have been no significant clinical changes since the completion of the originally dated History and Physical. 
 
Signed By: Isaiah Barry MD   
 January 7, 2021 9:03 AM

## 2021-01-07 NOTE — PERIOP NOTES
Pt sat up on side of bed for thoracentesis. Consent obtained. Time out performed. Pts vitals monitored throughout procedure. Right ultrasound done and pic taken of pleural fluid.  ~2000 ml yellow pleural fluid from L. Pt tolerated procedure well with no adverse rxn. Site dressed appropriately and report given to IT RN.    Lung sliding done and ultrasound findings reviewed by MD.

## 2021-01-07 NOTE — H&P
Department of Interventional Radiology  [phone]    History and Physical    Patient:  Leslie Espino MRN:  [id number]  SSN:  [id number]    YOB: 1940  Age:  68 y.o. Sex:  male      Primary Care Provider:  Rosalba Avalos MD  Referring Physician:  Sylvain Olivo MD    Subjective:     Chief Complaint: port    History of the Present Illness: The patient is a 68 y.o. male with lung cancer who presents for venous chest port placement. S/p right thoracentesis today with 2 L removed. He is breathing easier. NPO. Past Medical History:   Diagnosis Date    Hypercholesterolemia     Hypertension     controlled with med    Morbid obesity ([address] 11/11/15    BMI- 36.1    Other ill-defined conditions(799.89)     high cholesterol    Primary malignant neoplasm of lung metastatic to other site [facility]) 12/28/2020    Thyroid disease     partial thyroid     Past Surgical History:   Procedure Laterality Date    HX COLONOSCOPY      HX HEENT      part of thyroid removed    HX HERNIA REPAIR  2538    umbilical    HX ORTHOPAEDIC Left     bunion removal on left great toe.  HX OTHER SURGICAL      thyroid removed (1)        Review of Systems:    Pertinent items are noted in the History of Present Illness. Prior to Admission medications    Medication Sig Start Date End Date Taking? Authorizing Provider   HYDROcodone-acetaminophen (NORCO) 5-325 mg per tablet Take 1 Tab by mouth every six (6) hours as needed for Pain for up to 30 days. Max Daily Amount: 4 Tabs. 1/6/21 2/5/21  Jeffrey Maxwell NP   folic acid (FOLVITE) 1 mg tablet Take 1 Tab by mouth daily. 1/6/21   Negro Neal NP   furosemide (LASIX) 20 mg tablet Take 1 Tab by mouth daily. Indications: accumulation of fluid resulting from chronic heart failure 12/29/20   Sylvain Olivo MD   lidocaine-prilocaine (EMLA) topical cream Apply  to affected area as needed for Pain.  Apply to port site 30-45 min prior to needle stick 12/29/20 Brijesh Quinonez MD   ondansetron [facility] ODT) 8 mg disintegrating tablet Take 1 Tab by mouth every eight (8) hours as needed for Nausea or Vomiting. Indications: prevent nausea and vomiting from cancer chemotherapy 20   Brijesh Quinonez MD   prochlorperazine (Compazine) 10 mg tablet Take 1 Tab by mouth every six (6) hours as needed for Nausea or Vomiting for up to 30 days. Indications: nausea and vomiting caused by cancer drugs 20  Brijesh Quinonez MD   amLODIPine (NORVASC) 5 mg tablet Take 5 mg by mouth daily. Provider, Historical   cyanocobalamin (VITAMIN B-12) 1,000 mcg sublingual tablet Take 1,000 mcg by mouth every morning. Provider, Historical   POTASSIUM (POTASSIMIN PO) Take  by mouth every morning. Provider, Historical   Hydrochlorothiazide (HYDRODIURIL) 12.5 mg tablet Take 12.5 mg by mouth every morning. Provider, Historical   Cholecalciferol, Vitamin D3, (VITAMIN D3) 1,000 unit cap Take  by mouth every morning. Provider, Historical   Thyroid, Pork, (ARMOUR THYROID) 120 mg Tab Take 150 mg by mouth daily. Takes 1 (120 mg) + 1 (30 mg) tab daily    Provider, Historical        No Known Allergies    Family History   Problem Relation Age of Onset    Heart Disease Brother 48        MI    Alzheimer Mother     Heart Disease Brother     Pacemaker Brother     Diabetes Sister     Alzheimer Sister     Alcohol abuse Brother     Alzheimer Brother     Diabetes Brother     Alcohol abuse Brother     Alzheimer Brother      Social History     Tobacco Use    Smoking status: Former Smoker     Packs/day: 1.50     Years: 30.00     Pack years: 45.00     Quit date: 1986     Years since quittin.0    Smokeless tobacco: Former User    Tobacco comment: quit    Substance Use Topics    Alcohol use:  Yes     Alcohol/week: 1.0 standard drinks     Types: 1 Glasses of wine per week     Comment: daily        Objective:       Physical Examination:    Vitals:    21 1006   BP: (!) 155/81 Pulse: 90   Resp: 16   Temp: 97.8 °F (36.6 °C)   SpO2: 96%       Pain Assessment  Pain Intensity 1: 0 (01/07/21 1000)               HEART: regular rate and rhythm  LUNG: clear to auscultation bilaterally, diminished right  ABDOMEN: normal findings: soft, non-tender  EXTREMITIES: normal strength, tone, and muscle mass    Laboratory:     Lab Results   Component Value Date/Time    Sodium 142 12/28/2020 04:16 PM    Sodium 138 05/30/2013 08:20 AM    Potassium 4.5 12/28/2020 04:16 PM    Potassium 4.3 11/11/2015 03:20 PM    Chloride 108 (H) 12/28/2020 04:16 PM    Chloride 103 05/30/2013 08:20 AM    CO2 26 12/28/2020 04:16 PM    CO2 25 05/30/2013 08:20 AM    Anion gap 8 12/28/2020 04:16 PM    Anion gap 10 05/30/2013 08:20 AM    Glucose 104 (H) 12/28/2020 04:16 PM    Glucose 105 (H) 05/30/2013 08:20 AM    BUN 26 (H) 12/28/2020 04:16 PM    BUN 24 (H) 05/30/2013 08:20 AM    Creatinine 1.10 12/28/2020 04:16 PM    Creatinine 0.93 05/30/2013 08:20 AM    GFR est AA >60 12/28/2020 04:16 PM    GFR est AA >60 05/30/2013 08:20 AM    GFR est non-AA >60 12/28/2020 04:16 PM    GFR est non-AA >60 05/30/2013 08:20 AM    Calcium 9.7 12/28/2020 04:16 PM    Calcium 9.5 05/30/2013 08:20 AM    Magnesium 2.3 12/28/2020 04:16 PM    Albumin 3.0 (L) 12/28/2020 04:16 PM    Protein, total 7.8 12/28/2020 04:16 PM    Globulin 4.8 (H) 12/28/2020 04:16 PM    A-G Ratio 0.6 (L) 12/28/2020 04:16 PM    ALT (SGPT) 36 12/28/2020 04:16 PM     Lab Results   Component Value Date/Time    WBC 18.4 (H) 12/28/2020 04:16 PM    WBC 11.0 05/30/2013 08:20 AM    HGB 14.0 12/28/2020 04:16 PM    HGB 15.6 05/30/2013 08:20 AM    HCT 43.5 12/28/2020 04:16 PM    HCT 44.3 05/30/2013 08:20 AM    PLATELET 941 45/52/1570 04:16 PM    PLATELET 828 06/25/9284 08:20 AM     Lab Results   Component Value Date/Time    aPTT 27.5 05/30/2013 08:20 AM    aPTT 25.0 04/12/2010 07:20 AM    Prothrombin time 12.1 (H) 05/30/2013 08:20 AM    Prothrombin time 10.0 04/12/2010 07:20 AM    INR 1.2 05/30/2013 08:20 AM    INR 1.0 04/12/2010 07:20 AM       Assessment:     Lung cancer    Hospital Problems  Date Reviewed: 1/6/2021    None          Plan:     Planned Procedure:  Port placement    Risks, benefits, and alternatives reviewed with patient and he agrees to proceed with the procedure.       Signed By: Rhonda Payan PA-C     January 7, 2021

## 2021-01-07 NOTE — PROCEDURES
PROCEDURE: 
 
THERAPEUTIC THORACENTESIS 
 
 
 
PRE-OP DIAGNOSIS: 
 
 R PLEURAL EFFUSION POST-OP DIAGNOSIS: 
 
 R PLEURAL EFFUSION 
 
 
 
ANESTHESIA: 
 
LOCAL ANESTHESIA WITH 1% LIDOCAINE 10 CC TOTAL. CHEST ULTRASOUND FINDINGS: 
 
A Turbo-M, Sonosite ultrasound with a 5-16 mHz probe was used to image the chest and localize the pleural effusion on the Right chest. 
 
A large anechoic space was seen on the Right consistent with an uncomplicated pleural effusion. DESCRIPTION OF PROCEDURE: 
 
After obtaining informed consent and localizing the safest location for thoracentesis, the  10 intercostal space was marked with a blunt, plastic needle cap in the mid scapular line. An [employer] AK-0100 Pleral-Seal thoracentesis kit was used to perform the procedure. The skin was  cleansed with the supplied  chlorhexididne swab and then draped in the usual fasion. Using the previously marked location as a giude, a 22 G 1.5 inch needle was used to inject 10 cc of 1% lidocaine into the skin and subcutaneous tissue, as well as onto the underlying rib and inter-costal muscles, pleural fluid was aspirated to assure proper location, prior to removing the anesthesia needle. A 3mm  incision was then made, with the supplied scalpel in the usual fashion to facilitate the insertiopn of the thoracentesis needle. The needle with an 8French thoracentesis catheter was then introduced into the chest through the previously made incision in the usual fashion, the rib localized with the needle, and the catheter then marched over the rib into the pleural space. After aspirating fluid, the thoracentesis catheter was then placed into the chest using the needle itself as a trocar. The needle was then removed and the catheter was attached to the supplied tubing without complication. 2000 cc of serosanguinous fluid, was aspirated and sent for analysis. Fluid was  Not sent for the analysis as it is known it is related to malignancy. Post procedure US confirmed complete drainage of the effusion. EBL:  
 
1 drop COMPLICATIONS: 
 
Some mild  pain at the end of the procedure Mercy Felipe MD

## 2021-01-07 NOTE — ANESTHESIA PREPROCEDURE EVALUATION
Relevant Problems No relevant active problems Anesthetic History No history of anesthetic complications Review of Systems / Medical History Patient summary reviewed, nursing notes reviewed and pertinent labs reviewed Pulmonary Shortness of breath Neuro/Psych Within defined limits Cardiovascular Hypertension Hyperlipidemia Exercise tolerance: <4 METS 
  
GI/Hepatic/Renal 
Within defined limits Endo/Other Hypothyroidism Obesity Other Findings Physical Exam 
 
Airway Mallampati: II 
TM Distance: 4 - 6 cm Neck ROM: normal range of motion Mouth opening: Normal 
 
 Cardiovascular Rhythm: regular Rate: normal 
 
 
 
 Dental 
 
Dentition: Full upper dentures and Lower partial plate Pulmonary Decreased breath sounds Abdominal 
 
 
 
 Other Findings Anesthetic Plan ASA: 3 Anesthesia type: total IV anesthesia Induction: Intravenous Anesthetic plan and risks discussed with: Patient and Spouse 2L thoracentesis prior to procedure. Patient can lie flat

## 2021-01-07 NOTE — DISCHARGE INSTRUCTIONS
[address]  Department of Interventional Radiology  [facility]  [phone] Office  [phone] Fax  Implanted Port Discharge Instructions      General Instructions:   A port is like an implanted IV. They are usually ordered for patients who will be getting chemotherapy, but can also be used as an IV for long term antibiotics, large amounts of fluids, and/or blood products. Your blood can be drawn from your port for labs also. Those patients who do not have good veins find the ports convenient as they can get the IV they need with one stick. The port can be used long term, and the care is easy. The device is under the skin, and once the skin heals, care is minimal. All that is required is the nurse who accesses the port will need to flush it with heparinized saline after each use. Ports are usually placed in the chest wall, usually on the right side. But they can be place in the arms and in the abdomen. Home Care Instructions: If your port is in your arm, do not allow blood pressure or other IVs to be place in that arm. Do not allow bra straps or any clothing to rub the skin over the port. Do not bathe or swim until the skin has healed and if the port is accessed. Once it is healed, and when the port is not accessed, it is okay to bathe and swim. Restrict yourself to light activity for the first 5 days after getting the port put in, after that, resume normal activity slowly. You may resume your normal diet and medications. Follow-Up Instructions: Please see your oncologist, or whatever physician ordered the port as he/she has requested of you. Call If: You should call your Physician and/or the Radiology Nurse if you notice redness, pus, swelling, or pain from the area of your incision. Call if you should develop a fever. The nurses who access your port will know to call your doctor if the port does not seem to be working properly. You need to tell the nurses who use the port if you should have any pain or swelling at the site during an infusion. To Reach Us: If you have any questions about your procedure, please call the Interventional Radiology department at [phone]. After business hours (5pm) and weekends, call the answering service at [phone] and ask for the Radiologist on call to be paged. Si tiene Preguntas acerca del procedimiento, por favor llame al departamento de Radiología Intervencional al [phone]. Después de horas de oficina (5 pm) y los fines de [place], llamar al Snow Cain al [phone] y pregunte por el Radiologo de [place]on Pruitt. Interventional Radiology General Nurse Discharge    After general anesthesia or intravenous sedation, for 24 hours or while taking prescription Narcotics:  · Limit your activities  · Do not drive and operate hazardous machinery  · Do not make important personal or business decisions  · Do  not drink alcoholic beverages  · If you have not urinated within 8 hours after discharge, please contact your surgeon on call. * Please give a list of your current medications to your Primary Care Provider. * Please update this list whenever your medications are discontinued, doses are     changed, or new medications (including over-the-counter products) are added. * Please carry medication information at all times in case of emergency situations. These are general instructions for a healthy lifestyle:    No smoking/ No tobacco products/ Avoid exposure to second hand smoke  Surgeon General's Warning:  Quitting smoking now greatly reduces serious risk to your health.     Obesity, smoking, and sedentary lifestyle greatly increases your risk for illness  A healthy diet, regular physical exercise & weight monitoring are important for maintaining a healthy lifestyle    You may be retaining fluid if you have a history of heart failure or if you experience any of the following symptoms:  Weight gain of 3 pounds or more overnight or 5 pounds in a week, increased swelling in our hands or feet or shortness of breath while lying flat in bed. Please call your doctor as soon as you notice any of these symptoms; do not wait until your next office visit. Recognize signs and symptoms of STROKE:  F-face looks uneven    A-arms unable to move or move unevenly    S-speech slurred or non-existent    T-time-call 911 as soon as signs and symptoms begin-DO NOT go       Back to bed or wait to see if you get better-TIME IS BRAIN.       Patient Signature:  Date: 1/7/2021  Discharging Nurse: Nadege Carter

## 2021-01-07 NOTE — DISCHARGE INSTRUCTIONS
Patient Education        Thoracentesis: What to Expect at Home  Your Recovery  Thoracentesis (say \"vjya-jh-tnp-ARJUN-sis\") is a procedure to remove fluid from the space between the lungs and the chest wall (pleural space). This procedure may also be called a \"chest tap. \" It's normal to have a small amount of fluid in the pleural space. But too much fluid can build up because of problems such as infection, heart failure, or lung cancer. The procedure may have been done to help with shortness of breath and pain caused by the fluid buildup. Or you may have had this procedure so the doctor could test the fluid to find the cause of the buildup. Your chest may be sore where the doctor put the needle or catheter into your skin (the puncture site). This usually gets better after a day or two. You can go back to work or your normal activities as soon as you feel up to it. If a large amount of pleural fluid was removed during the procedure, you will probably be able to breathe more easily. If more pleural fluid collects and needs to be removed, another thoracentesis may be done later. If the doctor sent the fluid to a lab for testing, it may take several days to get the results. The doctor or nurse will discuss the results with you. This care sheet gives you a general idea about how long it will take for you to recover. But each person recovers at a different pace. Follow the steps below to feel better as quickly as possible. How can you care for yourself at home? Activity    · Rest when you feel tired. Getting enough sleep will help you recover.     · Avoid strenuous activities, such as bicycle riding, jogging, weight lifting, or aerobic exercise, until your doctor says it is okay.     · You may shower. Do not take a bath until the puncture site has healed, or until your doctor tells you it is okay.     · Ask your doctor when you can drive again.     · You may need to take 1 or 2 days off from work.  It depends on the type of work you do and how you feel. Diet    · You can eat your normal diet.     · Drink plenty of fluids (unless your doctor tells you not to). Medicines    · Your doctor will tell you if and when you can restart your medicines. He or she will also give you instructions about taking any new medicines.     · If you take aspirin or some other blood thinner, ask your doctor if and when to start taking it again. Make sure that you understand exactly what your doctor wants you to do.     · Be safe with medicines. Take pain medicines exactly as directed. ? If the doctor gave you a prescription medicine for pain, take it as prescribed. ? If you are not taking a prescription pain medicine, ask your doctor if you can take an over-the-counter medicine. ? Do not take two or more pain medicines at the same time unless the doctor told you to. Many pain medicines have acetaminophen, which is Tylenol. Too much acetaminophen (Tylenol) can be harmful.     · If you think your pain medicine is making you sick to your stomach:  ? Take your medicine after meals (unless your doctor has told you not to). ? Ask your doctor for a different pain medicine.     · If your doctor prescribed antibiotics, take them as directed. Do not stop taking them just because you feel better. You need to take the full course of antibiotics. Care of the puncture site    · Wash the area daily with warm, soapy water, and pat it dry. Don't use hydrogen peroxide or alcohol, which may delay healing. You may cover the area with a gauze bandage if it weeps or rubs against clothing. Change the bandage every day.     · Keep the area clean and dry. Follow-up care is a key part of your treatment and safety. Be sure to make and go to all appointments, and call your doctor if you are having problems. It's also a good idea to know your test results and keep a list of the medicines you take. When should you call for help?    Call 911 anytime you think you may need emergency care. For example, call if:    · You passed out (lost consciousness).     · You have severe trouble breathing.     · You have sudden chest pain and shortness of breath, or you cough up blood. Call your doctor now or seek immediate medical care if:    · You have shortness of breath that is new or getting worse.     · You have new or worse pain in your chest, especially when you take a deep breath.     · You are sick to your stomach or cannot keep fluids down.     · You have a fever over 100°F.     · Bright red blood has soaked through the bandage over your puncture site.     · You have signs of infection, such as:  ? Increased pain, swelling, warmth, or redness. ? Red streaks leading from the puncture site. ? Pus draining from the puncture site. ? Swollen lymph nodes in your neck, armpits, or groin. ? A fever.     · You cough up a lot more mucus than normal, or your mucus changes color. Watch closely for changes in your health, and be sure to contact your doctor if you have any problems. Where can you learn more? Go to http://www.gray.com/  Enter Q755 in the search box to learn more about \"Thoracentesis: What to Expect at Home. \"  Current as of: February 24, 2020               Content Version: 12.6  © 9614-9344 [employer]. Care instructions adapted under license by [employer] (which disclaims liability or warranty for this information). If you have questions about a medical condition or this instruction, always ask your healthcare professional. [place] [address] any warranty or liability for your use of this information. Call Gurvinder Dawson [facility] at [phone] for any questions or problems that may occur. Resume all medication as before procedure. Keep your previously scheduled follow up appointment.

## 2021-01-07 NOTE — ANESTHESIA POSTPROCEDURE EVALUATION
Procedure(s): 
IR ANESTHESIA/PORT INSERT. total IV anesthesia Anesthesia Post Evaluation Multimodal analgesia: multimodal analgesia used between 6 hours prior to anesthesia start to PACU discharge Patient location during evaluation: PACU Patient participation: complete - patient participated Level of consciousness: awake and alert Pain management: adequate Airway patency: patent Anesthetic complications: no 
Cardiovascular status: acceptable and hemodynamically stable Respiratory status: acceptable Hydration status: acceptable Post anesthesia nausea and vomiting:  none Final Post Anesthesia Temperature Assessment:  Normothermia (36.0-37.5 degrees C) INITIAL Post-op Vital signs:  
Vitals Value Taken Time /80 01/07/21 1152 Temp Pulse 91 01/07/21 1152 Resp 18 01/07/21 1152 SpO2 99 % 01/07/21 1152

## 2021-01-11 NOTE — PROGRESS NOTES
Patient states that he ate an apple and peanut butter this morning about 1 hour ago. Patient told at this time that his procedure can be rescheduled with sedation or can be rescheduled. Patient and wife wishes to speak with MD before making decision.

## 2021-01-11 NOTE — DISCHARGE INSTRUCTIONS
[address]  Department of Interventional Radiology  [facility]  [phone] Office  [phone] Fax    BIOPSY DISCHARGE INSTRUCTIONS    General Instructions:     A biopsy is the removal of a small piece of tissue for microscopic examination or testing. Healthy tissue can be obtained for the purpose of tissue-type matching for transplants. Unhealthy tissues are more commonly biopsied to diagnose disease. Lung Biopsy:     A needle lung biopsy is performed when there is a mass discovered in the lung area. The most serious risk is infection, bleeding, and pneumothorax (a collapsed lung). Signs of pneumothorax include shortness of breath, rapid heart rate, and blueness of the skin. If any of these occur, call 911. Liver Biopsy: This test helps detect cancer, infections, and the cause of an enlargement of the liver or elevated liver enzymes. It also helps to diagnose a number of liver diseases. The pain associated with the procedure may be felt in the shoulder. The risks include internal bleeding, pneumothorax, and injury to the surrounding organs. Renal Biopsy: This procedure is sometimes done to evaluate a transplanted kidney. It is also used to evaluate unexplained decrease in kidney function, blood, or protein in the urine. You may see bright red blood in the urine the first 24 hours after the test. If the bleeding lasts longer, you need to call your doctor. There is a risk of infection and bleeding into the muscle, which may cause soreness. Spinal Biopsy: This test is sometimes done in conjunction with other procedures. Your back will be sore, as we are taking a small sample of bone, which is slightly more difficult to sample than tissue. General Biopsy:     A mass can grow in any area of the body, and we are taking a specimen as ordered by your doctor. The risks are the same.  They include bleeding, pain, and infection. Home Care Instructions: You may resume your regular diet and medication regimen. Do not drink alcohol, drive, or make any important legal decisions in the next 24 hours. Do not lift anything heavier than a gallon of milk until the soreness goes away. You may use over the counter acetaminophen or ibuprofen for the soreness. You may apply an ice pack to the affected area for 20-30 minutes at time for the first 24 hours. After that, you may apply a heat pack. Call If: You should call your Physician and/or the Radiology Nurse if you have any questions or concerns about the biopsy site. Call if you should have increased pain, fever, redness, drainage, or bleeding more than a small spot on the bandage. Follow-Up Instructions: Please see your ordering doctor as he/she has requested. To Reach Us: If you have any questions about your procedure, please call the Interventional Radiology department at [phone]. After business hours (5pm) and weekends, call the answering service at [phone] and ask for the Radiologist on call to be paged. Si tiene Preguntas acerca del procedimiento, por favor llame al departamento de Radiología Intervencional al [phone]. Después de horas de oficina (5 pm) y los fines de [place], llamar al Dimitris Cain al [phone] y pregunte por el Radiologo de [place] [facility]. Interventional Radiology General Nurse Discharge    After general anesthesia or intravenous sedation, for 24 hours or while taking prescription Narcotics:  · Limit your activities  · Do not drive and operate hazardous machinery  · Do not make important personal or business decisions  · Do  not drink alcoholic beverages  · If you have not urinated within 8 hours after discharge, please contact your surgeon on call. * Please give a list of your current medications to your Primary Care Provider.   * Please update this list whenever your medications are discontinued, doses are changed, or new medications (including over-the-counter products) are added. * Please carry medication information at all times in case of emergency situations. These are general instructions for a healthy lifestyle:    No smoking/ No tobacco products/ Avoid exposure to second hand smoke  Surgeon General's Warning:  Quitting smoking now greatly reduces serious risk to your health. Obesity, smoking, and sedentary lifestyle greatly increases your risk for illness  A healthy diet, regular physical exercise & weight monitoring are important for maintaining a healthy lifestyle    You may be retaining fluid if you have a history of heart failure or if you experience any of the following symptoms:  Weight gain of 3 pounds or more overnight or 5 pounds in a week, increased swelling in our hands or feet or shortness of breath while lying flat in bed. Please call your doctor as soon as you notice any of these symptoms; do not wait until your next office visit. Recognize signs and symptoms of STROKE:  F-face looks uneven    A-arms unable to move or move unevenly    S-speech slurred or non-existent    T-time-call 911 as soon as signs and symptoms begin-DO NOT go       Back to bed or wait to see if you get better-TIME IS BRAIN.       Patient Signature:  Date: 1/11/2021  Discharging Nurse: Destini Mccormack RN

## 2021-01-12 PROBLEM — C34.90 NON-SMALL CELL LUNG CANCER (HCC): Status: ACTIVE | Noted: 2021-01-01

## 2021-01-12 NOTE — PROGRESS NOTES
1/12/2021  Patient seen with Kaylene Fisher NP for pre-treatment office visit for Carbo/Alimta/Keytruda C1. Questions and discussion completed to the satisfaction of the patient. Patient encouraged to take Murelax once a day for constipatation. Patient wishes to continue with treatment plan and navigation will follow.

## 2021-01-13 NOTE — PROGRESS NOTES
Pt tolerated thoracentesis well. 450 mL removed from the right side. Site dressed with skin glue. No bleeding noted.

## 2021-01-13 NOTE — PROGRESS NOTES
TRANSFER - OUT REPORT:    Verbal report given to Dulce Alvarado RN on Abdi Torre  being transferred to IR Recovery for routine post - op       Report consisted of patients Situation, Background, Assessment and   Recommendations(SBAR). Information from the following report(s) SBAR, Procedure Summary and MAR was reviewed with the receiving nurse. Opportunity for questions and clarification was provided. Conscious Sedation:   100 Mcg of Fentanyl administered  2 Mg of Versed administered  50 Mg of Benadryl administered    Pt tolerated procedure well.      Visit Vitals  BP (!) 162/79   Pulse 83   Temp 98.6 °F (37 °C)   Resp 14   Ht 5' 7\" (1.702 m)   Wt 90.7 kg (200 lb)   SpO2 98%   BMI 31.32 kg/m²     Past Medical History:   Diagnosis Date    Hypercholesterolemia     Hypertension     controlled with med    Morbid obesity ([facility] [address] 11/11/15    BMI- 36.1    Other ill-defined conditions(799.89)     high cholesterol    Primary malignant neoplasm of lung metastatic to other site ([facility] [address] 12/28/2020    Thyroid disease     partial thyroid     Peripheral IV 01/13/21 Right Antecubital (Active)   Site Assessment Clean, dry, & intact 01/13/21 1105   Phlebitis Assessment 0 01/13/21 1105   Infiltration Assessment 0 01/13/21 1105   Dressing Status Clean, dry, & intact 01/13/21 1105   Hub Color/Line Status Pink 01/13/21 1105              Venous Access Device 01/07/21 Upper chest (subclavicular area), left (Active)

## 2021-01-13 NOTE — PROCEDURES
Department of Interventional Radiology  [phone]        Interventional Radiology Brief Procedure Note    Patient: Ewa Marin MRN: [id number]  SSN: [id number]    YOB: 1940  Age: 68 y.o. Sex: male      Date of Procedure: 1/13/2021    Pre-Procedure Diagnosis: Lung cancer. Adrenal mass. Pleural effusion. Post-Procedure Diagnosis: SAME    Procedure(s): Image Guided Biopsy. Thoracentesis. Brief Description of Procedure: as above. Performed By: Cass Contreras MD     Assistants: None    Anesthesia:Moderate Sedation    Estimated Blood Loss: Less than 10ml    Specimens:  None    Implants:  None    Findings: Right adrenal mass--no hemorrhage identified after biopsy. Large pleural effusion. Complications: None    Recommendations: 1 hour bedrest.       Follow Up: Dr Hood Wesley.      Signed By: Cass Contreras MD     January 13, 2021

## 2021-01-13 NOTE — DISCHARGE INSTRUCTIONS
[address]  Department of Interventional Radiology  [facility]  [phone] Office  [phone] Fax    BIOPSY DISCHARGE INSTRUCTIONS    General Instructions:     A biopsy is the removal of a small piece of tissue for microscopic examination or testing. Healthy tissue can be obtained for the purpose of tissue-type matching for transplants. Unhealthy tissues are more commonly biopsied to diagnose disease. Lung Biopsy:     A needle lung biopsy is performed when there is a mass discovered in the lung area. The most serious risk is infection, bleeding, and pneumothorax (a collapsed lung). Signs of pneumothorax include shortness of breath, rapid heart rate, and blueness of the skin. If any of these occur, call 911. Liver Biopsy: This test helps detect cancer, infections, and the cause of an enlargement of the liver or elevated liver enzymes. It also helps to diagnose a number of liver diseases. The pain associated with the procedure may be felt in the shoulder. The risks include internal bleeding, pneumothorax, and injury to the surrounding organs. Renal Biopsy: This procedure is sometimes done to evaluate a transplanted kidney. It is also used to evaluate unexplained decrease in kidney function, blood, or protein in the urine. You may see bright red blood in the urine the first 24 hours after the test. If the bleeding lasts longer, you need to call your doctor. There is a risk of infection and bleeding into the muscle, which may cause soreness. Spinal Biopsy: This test is sometimes done in conjunction with other procedures. Your back will be sore, as we are taking a small sample of bone, which is slightly more difficult to sample than tissue. General Biopsy:     A mass can grow in any area of the body, and we are taking a specimen as ordered by your doctor. The risks are the same.  They include bleeding, pain, and infection. Home Care Instructions: You may resume your regular diet and medication regimen. Do not drink alcohol, drive, or make any important legal decisions in the next 24 hours. Do not lift anything heavier than a gallon of milk until the soreness goes away. You may use over the counter acetaminophen or ibuprofen for the soreness. You may apply an ice pack to the affected area for 20-30 minutes at time for the first 24 hours. After that, you may apply a heat pack. Call If: You should call your Physician and/or the Radiology Nurse if you have any questions or concerns about the biopsy site. Call if you should have increased pain, fever, redness, drainage, or bleeding more than a small spot on the bandage. Follow-Up Instructions: Please see your ordering doctor as he/she has requested. To Reach Us: If you have any questions about your procedure, please call the Interventional Radiology department at [phone]. After business hours (5pm) and weekends, call the answering service at [phone] and ask for the Radiologist on call to be paged. Si tiene Preguntas acerca del procedimiento, por favor llame al departamento de Radiología Intervencional al [phone]. Después de horas de oficina (5 pm) y los fines de [place], llamar al Joann Cain al [phone] y pregunte por el Radiologo de [facility]. Interventional Radiology General Nurse Discharge    After general anesthesia or intravenous sedation, for 24 hours or while taking prescription Narcotics:  · Limit your activities  · Do not drive and operate hazardous machinery  · Do not make important personal or business decisions  · Do  not drink alcoholic beverages  · If you have not urinated within 8 hours after discharge, please contact your surgeon on call. * Please give a list of your current medications to your Primary Care Provider.   * Please update this list whenever your medications are discontinued, doses are changed, or new medications (including over-the-counter products) are added. * Please carry medication information at all times in case of emergency situations. These are general instructions for a healthy lifestyle:    No smoking/ No tobacco products/ Avoid exposure to second hand smoke  Surgeon General's Warning:  Quitting smoking now greatly reduces serious risk to your health. Obesity, smoking, and sedentary lifestyle greatly increases your risk for illness  A healthy diet, regular physical exercise & weight monitoring are important for maintaining a healthy lifestyle    You may be retaining fluid if you have a history of heart failure or if you experience any of the following symptoms:  Weight gain of 3 pounds or more overnight or 5 pounds in a week, increased swelling in our hands or feet or shortness of breath while lying flat in bed. Please call your doctor as soon as you notice any of these symptoms; do not wait until your next office visit. Recognize signs and symptoms of STROKE:  F-face looks uneven    A-arms unable to move or move unevenly    S-speech slurred or non-existent    T-time-call 911 as soon as signs and symptoms begin-DO NOT go       Back to bed or wait to see if you get better-TIME IS BRAIN.       Patient Signature:  Date: 1/13/2021  Discharging Nurse: Sanna Rodriguez RN

## 2021-01-13 NOTE — PROGRESS NOTES
Pt to CT via stretcher. IR Nurse Pre-Procedure Checklist Part 2          Consent signed: Yes    H&P complete:  Yes    Antibiotics: Not applicable    Airway/Mallampati Done: Yes    Shaved: Not applicable    Pregnancy Form:Not applicable    Patient Position: Yes    MD Side: Yes     Biopsy Worksheet: Yes    Specimen Medium: Yes      The patient was counseled at length about the risks of wilberto Covid-19 during their perioperative period and any recovery window from their procedure. The patient was made aware that wilberto Covid-19  may worsen their prognosis for recovering from their procedure and lend to a higher morbidity and/or mortality risk. All material risks, benefits, and reasonable alternatives including postponing the procedure were discussed. The patient does  wish to proceed with the procedure at this time.

## 2021-01-13 NOTE — PROGRESS NOTES
Recovery period without difficulty. Pt alert and oriented and denies pain. Dressing is clean, dry, and intact. Reviewed discharge instructions with patient and ***, both verbalized understanding. Pt escorted to lobby discharge area via wheelchair. Vital signs and Nabila score completed.

## 2021-01-13 NOTE — H&P
Department of Interventional Radiology  [phone]    History and Physical    Patient:  Reggie Gifford MRN:  [id number]  SSN:  [id number]    YOB: 1940  Age:  68 y.o. Sex:  male      Primary Care Provider:  Phi Renee MD  Referring Physician:  Aracelis Silva MD    Subjective:     Chief Complaint: biopsy    History of the Present Illness: The patient is a 68 y.o. male with lung cancer, right adrenal mass who presents for biopsy of the adrenal mass. NPO x meds. Past Medical History:   Diagnosis Date    Hypercholesterolemia     Hypertension     controlled with med    Morbid obesity ([facility] [address] 11/11/15    BMI- 36.1    Other ill-defined conditions(799.89)     high cholesterol    Primary malignant neoplasm of lung metastatic to other site [facility]) 12/28/2020    Thyroid disease     partial thyroid     Past Surgical History:   Procedure Laterality Date    HX COLONOSCOPY      HX HEENT      part of thyroid removed    HX HERNIA REPAIR  1170    umbilical    HX ORTHOPAEDIC Left     bunion removal on left great toe.  HX OTHER SURGICAL      thyroid removed (1)    IR INSERT TUNL CVC W PORT OVER 5 YEARS  1/7/2021        Review of Systems:    Pertinent items are noted in the History of Present Illness. Prior to Admission medications    Medication Sig Start Date End Date Taking? Authorizing Provider   prochlorperazine (Compazine) 10 mg tablet Take 1 Tab by mouth every six (6) hours as needed for Nausea or Vomiting for up to 30 days. Indications: nausea and vomiting caused by cancer drugs 1/12/21 2/11/21  Aracelis Silva MD   ondansetron [facility] ODT) 8 mg disintegrating tablet Take 1 Tab by mouth every eight (8) hours as needed for Nausea or Vomiting. Indications: prevent nausea and vomiting from cancer chemotherapy 1/12/21   Aracelis Silva MD   lidocaine-prilocaine (EMLA) topical cream Apply a dab to port site 30-45 min prior to labs/infusion appt.   Cover with saran wrap 1/8/21 Lexy Barbosa MD   HYDROcodone-acetaminophen [facility]) 5-325 mg per tablet Take 1 Tab by mouth every six (6) hours as needed for Pain for up to 30 days. Max Daily Amount: 4 Tabs. 21  Charlotte Menjivar NP   folic acid (FOLVITE) 1 mg tablet Take 1 Tab by mouth daily. 21   Rena Brooks NP   furosemide (LASIX) 20 mg tablet Take 1 Tab by mouth daily. Indications: accumulation of fluid resulting from chronic heart failure 20   Lexy Barbosa MD   amLODIPine (NORVASC) 5 mg tablet Take 5 mg by mouth daily. Provider, Historical   cyanocobalamin (VITAMIN B-12) 1,000 mcg sublingual tablet Take 1,000 mcg by mouth every morning. Provider, Historical   POTASSIUM (POTASSIMIN PO) Take  by mouth every morning. Provider, Historical   Hydrochlorothiazide (HYDRODIURIL) 12.5 mg tablet Take 12.5 mg by mouth every morning. Provider, Historical   Cholecalciferol, Vitamin D3, (VITAMIN D3) 1,000 unit cap Take  by mouth every morning. Provider, Historical   Thyroid, Pork, (ARMOUR THYROID) 120 mg Tab Take 150 mg by mouth daily. Takes 1 (120 mg) + 1 (30 mg) tab daily    Provider, Historical        No Known Allergies    Family History   Problem Relation Age of Onset    Heart Disease Brother 48        MI    Alzheimer Mother     Heart Disease Brother     Pacemaker Brother     Diabetes Sister     Alzheimer Sister     Alcohol abuse Brother     Alzheimer Brother     Diabetes Brother     Alcohol abuse Brother     Alzheimer Brother      Social History     Tobacco Use    Smoking status: Former Smoker     Packs/day: 1.50     Years: 30.00     Pack years: 45.00     Quit date: 1986     Years since quittin.0    Smokeless tobacco: Former User    Tobacco comment: quit    Substance Use Topics    Alcohol use:  Yes     Alcohol/week: 1.0 standard drinks     Types: 1 Glasses of wine per week     Comment: daily        Objective:       Physical Examination:    Vitals:    21 1055   BP: (!) 166/94   Pulse: 90   Resp: 16   Temp: 98.6 °F (37 °C)   SpO2: 95%   Weight: 90.7 kg (200 lb)   Height: 5' 7\" (1.702 m)       Pain Assessment  Pain Intensity 1: 0 (01/13/21 1055)               HEART: regular rate and rhythm  LUNG: clear to auscultation bilaterally, diminished right  ABDOMEN: normal findings: soft, non-tender  EXTREMITIES: warm, + LE edema  Chest port incision healing well.     Laboratory:     Lab Results   Component Value Date/Time    Sodium 140 01/12/2021 11:51 AM    Sodium 142 12/28/2020 04:16 PM    Potassium 4.2 01/12/2021 11:51 AM    Potassium 4.5 12/28/2020 04:16 PM    Chloride 107 01/12/2021 11:51 AM    Chloride 108 (H) 12/28/2020 04:16 PM    CO2 27 01/12/2021 11:51 AM    CO2 26 12/28/2020 04:16 PM    Anion gap 6 (L) 01/12/2021 11:51 AM    Anion gap 8 12/28/2020 04:16 PM    Glucose 102 (H) 01/12/2021 11:51 AM    Glucose 104 (H) 12/28/2020 04:16 PM    BUN 25 (H) 01/12/2021 11:51 AM    BUN 26 (H) 12/28/2020 04:16 PM    Creatinine 0.90 01/12/2021 11:51 AM    Creatinine 1.10 12/28/2020 04:16 PM    GFR est AA >60 01/12/2021 11:51 AM    GFR est AA >60 12/28/2020 04:16 PM    GFR est non-AA >60 01/12/2021 11:51 AM    GFR est non-AA >60 12/28/2020 04:16 PM    Calcium 9.6 01/12/2021 11:51 AM    Calcium 9.7 12/28/2020 04:16 PM    Magnesium 2.2 01/12/2021 11:51 AM    Magnesium 2.3 12/28/2020 04:16 PM    Albumin 2.7 (L) 01/12/2021 11:51 AM    Albumin 3.0 (L) 12/28/2020 04:16 PM    Protein, total 7.5 01/12/2021 11:51 AM    Protein, total 7.8 12/28/2020 04:16 PM    Globulin 4.8 (H) 01/12/2021 11:51 AM    Globulin 4.8 (H) 12/28/2020 04:16 PM    A-G Ratio 0.6 (L) 01/12/2021 11:51 AM    A-G Ratio 0.6 (L) 12/28/2020 04:16 PM    ALT (SGPT) 34 01/12/2021 11:51 AM    ALT (SGPT) 36 12/28/2020 04:16 PM     Lab Results   Component Value Date/Time    WBC 20.6 (H) 01/12/2021 11:51 AM    WBC 18.4 (H) 12/28/2020 04:16 PM    HGB 13.8 01/12/2021 11:51 AM    HGB 14.0 12/28/2020 04:16 PM    HCT 42.9 01/12/2021 11:51 AM    HCT 43.5 12/28/2020 04:16 PM    PLATELET 528 55/52/3571 11:51 AM    PLATELET 449 11/60/6684 04:16 PM     Lab Results   Component Value Date/Time    aPTT 27.5 05/30/2013 08:20 AM    aPTT 25.0 04/12/2010 07:20 AM    Prothrombin time 12.1 (H) 05/30/2013 08:20 AM    Prothrombin time 10.0 04/12/2010 07:20 AM    INR 1.2 05/30/2013 08:20 AM    INR 1.0 04/12/2010 07:20 AM       Assessment:     Lung cancer, adrenal mass    Hospital Problems  Date Reviewed: 1/12/2021    None          Plan:     Planned Procedure:  Biopsy adrenal mass    Risks, benefits, and alternatives reviewed with patient and he agrees to proceed with the procedure.       Signed By: Madina Andres PA-C     January 13, 2021

## 2021-01-19 NOTE — PROGRESS NOTES
1/19/21 saw pt today with Kayy Conner NP for tox check post cycle 1 chemo. He tolerated chemo well. PO intake is good. Swelling in legs has improved. Some constipation but resolved. Follow up 2/2. Encouraged to call with any concerns. Navigation will continue to follow.

## 2021-01-27 NOTE — PROGRESS NOTES
I saw patient today after being seen for rash. Pt states rash does not itch and but feels lower extremities are more red. Pt will start Keflex and Prednisone for possible cellulitis or Keytruda reaction. Hydrocortisone cream encouraged as well as Benadryl po. Pt encouraged to call with any questions or concerns.

## 2021-02-02 NOTE — PROGRESS NOTES
I saw patient today with Dr Hood Wesley prior to C2 Carbo/Keytruda/Alimta. Pt states he feels much better after starting on Prednisone and rash has improved. He will continue and decrease to 60mg for one week then 40mg for one week then 20mg for one week. Pt remains on antibiotics. Dr Hood Wesley states we will omit Keytruda due to rash but continue cycle 2. Pt will have PET scan prior to C3.  Nurse navigation will continue to follow

## 2021-02-03 NOTE — PROGRESS NOTES
Pt arrived ambulatory to [facility]. Port accessed with goo blood return. NS infusing. Pre meds given as ordered. Alimta infused. Pt monitored for 30 minutes. Carboplatin infused. Pt aware of next appt on 2/24/21 at 1330. Port flushed and de accessed. Pt discharged ambulatory.

## 2021-02-17 NOTE — PROGRESS NOTES
Saw patient this morning with wife in radiation or appt to see Dr Kayleigh Andujar. Pt was rescheduled until tomorrow with Dr Kayleigh Andujar and MRI will be obtained today of T-spine. Patient and wife are aware. Pt denies any new incontinence, leg weakness, immobility, or pain at this time.

## 2021-02-18 NOTE — PROGRESS NOTES
Patient: Reggie Gifford MRN: [id number]  SSN: [id number] YOB: 1940  Age: 68 y.o. Sex: male Other Providers:  Dr. Sherie Ren CHIEF COMPLAINT: Pain DIAGNOSIS: Adenocarcinoma of the right lung, stage IV PREVIOUS RADIATION TREATMENT: 
1) None HISTORY OF PRESENT ILLNESS:  Reggie Gifford is a 68 y.o. male who I am seeing at the request of Dr. Sherie Ren. Mr. Adolfo Velez was in his usual state of health until 11/30/2020 at which time he underwent CXR which demonstrated abnormal changes in the right hemithorax. This was followed by a CT with contrast on 12/4/2020 which confirmed a right paratracheal mass measuring 5.6x9.1x5.4mm, a large right pleural effusion, a 1.9cm left adrenal gland nodule most consistent with adenoma, 2.5cm right adrenal gland nodule which may represent an adenoma but metastatic disease not excluded, a 3.4cm left renal cyst, and 1.0cm cyst in the left lobe of the liver. On 12/14/21 he underwent a right thoracentesis with 2200cc yellow fluid removed which demonstrated rare malignant cells consistent with lung adenocarcinoma (TTF1 positive, Napsin positive, p40 negative), although there was limited cellularity present. PET/CT 12/22/2020 confirmed a primary right suprahilar and perihilar bronchogenic carcinoma with extensive metastatic soft tissue pleural disease and associated large pleural effusion, an FDG avid mediastinal and paraesophageal metastatic lymphadenopathy, and large right adrenal metastasis. A biopsy of the right adrenal gland 1/13/21 confirmed moderately differentiated adenocarcinoma consistent with metastatic lung adenocarcinoma. He was referred to Dr. Cosmo Danielle and initiated treatment with dose reduced carbo/ pem/ pembro 1/14/21. This was unfortunately complicated by a diffuse rash thought to be due to immunotherapy. An MRI rachell 2/2/21 was without intracranial metastasis. Pembro was subsequently held and he was started on prednisone, which had been tapered to 20mg daily on 2/2/21. He received his second cycle on 2/3/21. A re-staging PET/CT was performed 2/15/21. This demonstrated persistent extensive hypermetabolic pleural-based tumor as well as increasing size of a right paraspinal lesion at the T5-6 level now measuring 3.1cm as compared to 1.5cm previously with extension into the psinal canal through the neural foramen. The right adrenal metastatic lesion measures 2.2 as compared to 2.5 cm and RUL mass measures 4.3 as compared to 5.4cm. He was started again on prednisone, 4mg three times daily. An MRI was obtained 2/17/21 which confirmed metastatic pleural disease with extension into the spinal canal through T4-5 and T5-6 neural foramen, with epidural tumor at T5 slightly displacing the cord to the left without evidence of definite cord compression. Currently, he denies any recent back pain prior to his PET/CT or after increasing his prednisone. He has had no numbness, tingling, or weakness. He continues to have dyspnea on exertion and is only able to walk approximately 40 feet before becoming short of breath, which is significantly worse compared to a year prior. He denies cough and hemoptysis. PAST MEDICAL HISTORY:   
Past Medical History:  
Diagnosis Date  Hypercholesterolemia  Hypertension   
 controlled with med  Morbid obesity ([facility] [address] 11/11/15 BMI- 36.1  Other ill-defined conditions(799.89)   
 high cholesterol  Primary malignant neoplasm of lung metastatic to other site [facility]) 12/28/2020  Thyroid disease   
 partial thyroid The patient denies history of collagen vascular diseases, pacemaker insertion, or prior radiation. See HPI for details of prior chemotherapy. PAST SURGICAL HISTORY:  
Past Surgical History:  
Procedure Laterality Date  HX COLONOSCOPY    
 HX HEENT    
 part of thyroid removed  HX HERNIA REPAIR  9059  
 umbilical  
 HX ORTHOPAEDIC Left   
 bunion removal on left great toe.  HX OTHER SURGICAL    
 thyroid removed (1)  IR INSERT TUNL CVC W PORT OVER 5 YEARS  1/7/2021 MEDICATIONS:  
 
Current Outpatient Medications:  
  dexAMETHasone (DECADRON) 4 mg tablet, One tab three times a day, Disp: 90 Tab, Rfl: 0 
  folic acid (FOLVITE) 1 mg tablet, Take 1 Tab by mouth daily. , Disp: 30 Tab, Rfl: 5 
  predniSONE (DELTASONE) 10 mg tablet, Take 90 mg by mouth daily (with breakfast). , Disp: 90 Tab, Rfl: 0 
  prochlorperazine (Compazine) 10 mg tablet, Take 1 Tab by mouth every six (6) hours as needed for Nausea or Vomiting for up to 30 days. Indications: nausea and vomiting caused by cancer drugs, Disp: 90 Tab, Rfl: 3 
  ondansetron (ZOFRAN ODT) 8 mg disintegrating tablet, Take 1 Tab by mouth every eight (8) hours as needed for Nausea or Vomiting. Indications: prevent nausea and vomiting from cancer chemotherapy, Disp: 90 Tab, Rfl: 3 
  lidocaine-prilocaine (EMLA) topical cream, Apply a dab to port site 30-45 min prior to labs/infusion appt. Cover with saran wrap, Disp: 30 g, Rfl: 2 
  HYDROcodone-acetaminophen (NORCO) 5-325 mg per tablet, Take 1 Tab by mouth every six (6) hours as needed for Pain for up to 30 days. Max Daily Amount: 4 Tabs., Disp: 30 Tab, Rfl: 0 
  furosemide (LASIX) 20 mg tablet, Take 1 Tab by mouth daily. Indications: accumulation of fluid resulting from chronic heart failure, Disp: 90 Tab, Rfl: 1 
  amLODIPine (NORVASC) 5 mg tablet, Take 5 mg by mouth daily. , Disp: , Rfl:  
   cyanocobalamin (VITAMIN B-12) 1,000 mcg sublingual tablet, Take 1,000 mcg by mouth every morning., Disp: , Rfl:  
  POTASSIUM (POTASSIMIN PO), Take  by mouth every morning., Disp: , Rfl:  
  Hydrochlorothiazide (HYDRODIURIL) 12.5 mg tablet, Take 12.5 mg by mouth every morning., Disp: , Rfl:  
  Cholecalciferol, Vitamin D3, (VITAMIN D3) 1,000 unit cap, Take  by mouth every morning., Disp: , Rfl:  
  Thyroid, Pork, (ARMOUR THYROID) 120 mg Tab, Take 150 mg by mouth daily. Takes 1 (120 mg) + 1 (30 mg) tab daily, Disp: , Rfl:  
No current facility-administered medications for this visit. ALLERGIES:  
No Known Allergies SOCIAL HISTORY:  
Social History Socioeconomic History  Marital status:  Spouse name: Not on file  Number of children: Not on file  Years of education: Not on file  Highest education level: Not on file Occupational History  Not on file Social Needs  Financial resource strain: Not on file  Food insecurity Worry: Not on file Inability: Not on file  Transportation needs Medical: Not on file Non-medical: Not on file Tobacco Use  Smoking status: Former Smoker Packs/day: 1.50 Years: 30.00 Pack years: 45.00 Quit date: 1986 Years since quittin.1  Smokeless tobacco: Former User  Tobacco comment: quit  Substance and Sexual Activity  Alcohol use: Yes Alcohol/week: 1.0 standard drinks Types: 1 Glasses of wine per week Comment: daily  Drug use: No  
 Sexual activity: Not on file Lifestyle  Physical activity Days per week: Not on file Minutes per session: Not on file  Stress: Not on file Relationships  Social connections Talks on phone: Not on file Gets together: Not on file Attends Islam service: Not on file Active member of club or organization: Not on file Attends meetings of clubs or organizations: Not on file Relationship status: Not on file  Intimate partner violence Fear of current or ex partner: Not on file Emotionally abused: Not on file Physically abused: Not on file Forced sexual activity: Not on file Other Topics Concern  Not on file Social History Narrative  Not on file FAMILY HISTORY:  
Family History Problem Relation Age of Onset  Heart Disease Brother 48 MI  
 Alzheimer Mother  Heart Disease Brother  Pacemaker Brother  Diabetes Sister  Alzheimer Sister  Alcohol abuse Brother  Alzheimer Brother  Diabetes Brother  Alcohol abuse Brother  Alzheimer Brother REVIEW OF SYSTEMS:  
A full 12-point review of systems was completed and was negative unless noted in the history of present illness. PHYSICAL EXAMINATION:  
ECOG Performance status 2 VITAL SIGNS:  
Visit Vitals BP (!) 148/92 Pulse (!) 106 Temp (!) 96.4 °F (35.8 °C) Wt 94 kg (207 lb 3.2 oz) SpO2 97% BMI 32.45 kg/m² General: well developed/nourished adult Male in no acute distress; appears stated age HEENT: normocephalic, atraumatic; EOMI Neck: supple with full ROM; no cervical lymphadenopathy Cardiovascular: normal S1 and S2, RRR, no murmurs Respiratory: normal inspiratory effort, no audible wheezes Extremities: no cyanosis, clubbing, or edema Musculoskeletal: mobility intact x4; normal ROM in all joints, no tenderness to palpation along the spine Skin: no skin lesions identified Neuro: AOx3; sensation intact x 4; CNII-XII grossly intact Psych: appropriate affect, insight, and judgement GI: abdomen soft, non-distended : not indicated Breast: not indicated PATHOLOGY:   
I personally reviewed the pathology reports as documented in the HPI. LABORATORY:  
Lab Results Component Value Date/Time  Sodium 139 02/02/2021 09:43 AM  
 Potassium 4.7 02/02/2021 09:43 AM  
 Chloride 106 02/02/2021 09:43 AM  
 CO2 28 02/02/2021 09:43 AM  
 Anion gap 5 (L) 02/02/2021 09:43 AM  
 Glucose 126 (H) 02/02/2021 09:43 AM  
 BUN 44 (H) 02/02/2021 09:43 AM  
 Creatinine 1.30 02/02/2021 09:43 AM  
 GFR est AA >60 02/02/2021 09:43 AM  
 GFR est non-AA 57 (L) 02/02/2021 09:43 AM  
 Calcium 9.2 02/02/2021 09:43 AM  
 Magnesium 2.4 02/02/2021 09:43 AM  
 Albumin 2.7 (L) 02/02/2021 09:43 AM  
 Protein, total 7.2 02/02/2021 09:43 AM  
 Globulin 4.5 (H) 02/02/2021 09:43 AM  
 A-G Ratio 0.6 (L) 02/02/2021 09:43 AM  
 ALT (SGPT) 110 (H) 02/02/2021 09:43 AM  
 
Lab Results Component Value Date/Time WBC 15.6 (H) 02/02/2021 09:43 AM  
 HGB 13.2 (L) 02/02/2021 09:43 AM  
 HCT 41.9 02/02/2021 09:43 AM  
 PLATELET 871 10/84/6512 09:43 AM  
 
 
RADIOLOGY:   
I personally reviewed the images and agree with the findings as documented in the HPI. IMPRESSION:  Luisa Dubon is a 68 y.o. male with recently diagnosed adenocarcinoma of the right lung, metastatic to the adrenal and lung, with a progressive mass along the pleura extending into the T5 epidural space without cord compression or neurologic symptoms. I had a long discussion with Luisa Dubon regarding treatment options, specifically continued observation versus radiation therapy. Given that his disease progressed on first line systemic therapy and does demonstrate epidural extension I recommended a short course of palliative radiation to minimize the risk of progressive symptoms or neurologic compromise. I reviewed in detail the anticipated acute and late toxicities of radiation therapy as well as the logistics of treatment and expected disease control. Luisa Dubon and his family had the opportunity to ask questions which appeared to be answered to their satisfaction and have elected to proceed with treatment as planned. PLAN:   
1) Consented patient for treatment with external beam radiation after discussing risk, benefits, and side effects from treatment. 2) Continue prednisone 4mg three times dally 3) Simulation to be scheduled urgently given epidural extension, patient counseled to contact our office immediately for any pain or new neurologic symptoms 4) Follow up with Dr. Germain Dong for 2/23 Hua Garcia MD  
February 18, 2021

## 2021-02-18 NOTE — PROGRESS NOTES
Pt here today for the initial RT consult for RUL lung and T spine 4-6 cancer. Pt has been receiving chemo by Dr. Marielle Bradford and is scheduled again for 2/24/21. Nallely Vernon is pt's NN. The 2/17/21 MRI T Spine indicated lesions 4-6 with a mass on T5 displacing the spinal cord. Pt denies any pain. He is taking Decadron 4 mg 3 times daily. An overview of RT was given. Pt has received his first [address] vaccine and is due for the second one 3/1/21.

## 2021-02-23 NOTE — PROGRESS NOTES
I saw patient today with Dr Silvester Romberg. We will hold chemo for now until radiation is complete in approx 2 weeks. Pt will remain on Dex 4mg 3 times a day and start 20mg Prednisone only when chemo is restarted with Pembro added.

## 2021-02-23 NOTE — PROGRESS NOTES
I saw patient today with Dr Neal Councilman. Pt is to be CT SIM'd today and will start radiation later this week. We will hold chemo this week and schedule chemo 3-16-21. Pt will restart Pembro at next cycle along with 20mg Prednisone to prevent rash again. Pt currently is NOT taking Prednisone and only taking Dex. Nurse navigation will be following

## 2021-03-04 PROBLEM — I63.9 ACUTE ISCHEMIC STROKE (HCC): Status: ACTIVE | Noted: 2021-01-01

## 2021-03-04 NOTE — CONSULTS
[facility] Neurology [facility] [address] [place], [address] [facility] Chief Complaint Patient presents with  Facial Droop Sam Palacios is a 68 y.o. male who presents on referral from the emergency room for an acute Code S alert the patient was noted this morning to be confused and probably these symptoms had begun yesterday. He was also noted to have some degree of right facial droop Of note is the fact that he had an episode 2 weeks ago when he apparently decided that he needed to go to the grocery store at 11:45 at night. He was not heard for a considerable period of time but at 3:30 AM called his wife from [facility] and indicated that he had made a wrong turn. 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 A recent PET scanning demonstrated the presence of substantial metastatic disease in multiple areas including the adrenal persistent mass in the right lung pleural-based tumor with right paraspinal lesion which is increasing in size Past Medical History:  
Diagnosis Date  Hypercholesterolemia  Hypertension   
 controlled with med  Morbid obesity ([facility] [address] 11/11/15 BMI- 36.1  Other ill-defined conditions(799.89)   
 high cholesterol  Primary malignant neoplasm of lung metastatic to other site [facility]) 12/28/2020  Thyroid disease   
 partial thyroid Past Surgical History:  
Procedure Laterality Date  HX COLONOSCOPY    
 HX HEENT    
 part of thyroid removed  HX HERNIA REPAIR  1801  
 umbilical  
 HX ORTHOPAEDIC Left   
 bunion removal on left great toe.  HX OTHER SURGICAL    
 thyroid removed (1)  IR INSERT TUNL CVC W PORT OVER 5 YEARS  1/7/2021 Family History Problem Relation Age of Onset  Heart Disease Brother 48      MI  
 Alzheimer Mother  Heart Disease Brother  Pacemaker Brother  Diabetes Sister  Alzheimer Sister  Alcohol abuse Brother  Alzheimer Brother  Diabetes Brother  Alcohol abuse Brother  Alzheimer Brother Social History Socioeconomic History  Marital status:  Spouse name: Not on file  Number of children: Not on file  Years of education: Not on file  Highest education level: Not on file Tobacco Use  Smoking status: Former Smoker Packs/day: 1.50 Years: 30.00 Pack years: 45.00 Quit date: 1986 Years since quittin.1  Smokeless tobacco: Former User  Tobacco comment: quit  Substance and Sexual Activity  Alcohol use: Yes Alcohol/week: 1.0 standard drinks Types: 1 Glasses of wine per week Comment: daily  Drug use: No  
 
 
 
No current facility-administered medications for this encounter. Current Outpatient Medications:  
  dexAMETHasone (DECADRON) 4 mg tablet, One tab three times a day, Disp: 90 Tab, Rfl: 0 
  folic acid (FOLVITE) 1 mg tablet, Take 1 Tab by mouth daily. , Disp: 30 Tab, Rfl: 5 
  predniSONE (DELTASONE) 10 mg tablet, Take 90 mg by mouth daily (with breakfast). , Disp: 90 Tab, Rfl: 0 
  prochlorperazine (Compazine) 10 mg tablet, Take 1 Tab by mouth every six (6) hours as needed for Nausea or Vomiting for up to 30 days. Indications: nausea and vomiting caused by cancer drugs, Disp: 90 Tab, Rfl: 3 
  ondansetron (ZOFRAN ODT) 8 mg disintegrating tablet, Take 1 Tab by mouth every eight (8) hours as needed for Nausea or Vomiting. Indications: prevent nausea and vomiting from cancer chemotherapy, Disp: 90 Tab, Rfl: 3 
  lidocaine-prilocaine (EMLA) topical cream, Apply a dab to port site 30-45 min prior to labs/infusion appt.   Cover with saran wrap, Disp: 30 g, Rfl: 2 
  HYDROcodone-acetaminophen (NORCO) 5-325 mg per tablet, Take 1 Tab by mouth every six (6) hours as needed for Pain for up to 30 days. Max Daily Amount: 4 Tabs., Disp: 30 Tab, Rfl: 0 
•  furosemide (LASIX) 20 mg tablet, Take 1 Tab by mouth daily. Indications: accumulation of fluid resulting from chronic heart failure, Disp: 90 Tab, Rfl: 1 
•  amLODIPine (NORVASC) 5 mg tablet, Take 5 mg by mouth daily., Disp: , Rfl:  
•  cyanocobalamin (VITAMIN B-12) 1,000 mcg sublingual tablet, Take 1,000 mcg by mouth every morning., Disp: , Rfl:  
•  POTASSIUM (POTASSIMIN PO), Take  by mouth every morning., Disp: , Rfl:  
•  Hydrochlorothiazide (HYDRODIURIL) 12.5 mg tablet, Take 12.5 mg by mouth every morning., Disp: , Rfl:  
•  Cholecalciferol, Vitamin D3, (VITAMIN D3) 1,000 unit cap, Take  by mouth every morning., Disp: , Rfl:  
•  Thyroid, Pork, (ARMOUR THYROID) 120 mg Tab, Take 150 mg by mouth daily. Takes 1 (120 mg) + 1 (30 mg) tab daily, Disp: , Rfl:  
 
No Known Allergies 
 
Review of Systems 
Not feasible as patient confused 
There were no vitals taken for this visit. 
 
Neurologic Exam 
Elderly gentleman who can identify common objects appears a little bewildered. 
No persistent gaze paralysis 
Mild right facial droop 
Tongue protrudes midline 
Some drift left upper extremity 
Poor strength in both lower extremities and deconditioning 
 
Most recent MRI 
Results from Hospital Encounter encounter on 02/17/21  
MRI THORAC SPINE W WO CONT  
 Narrative MRI thoracic spine with and without contrast 
 
HISTORY: Lung carcinoma. Paraspinal mass with spinal canal invasion noted on PET 
scanning. 
 
TECHNIQUE: Multiplanar multisequence imaging of the thoracic spine was obtained 
on a 1.5 Lima magnet, utilizing the uneventful administration of 19 mL of 
intravenous Dotarem in order to better evaluate for spinal pathology. 
 
COMPARISON: None. Correlation is made to the PET scan dated 02/16/2021. 
 
FINDINGS: The vertebral body height and alignment are well-maintained. The disc 
space heights are well-preserved. 
 
There is thickened and enhancing pleural  nodularity within the visualized right 
hemithorax predominating at the mid and superior aspects. There is contiguous 
enhancement into the neural foramen of T5-6 where there is severe foraminal 
narrowing. Similar but less impressive findings are present at T4-5. There is 
abnormal enhancement extending into the right aspect of the spinal canal at the 
level of T5 measuring up to 2.6 cm in craniocaudal extent. There is mild 
leftward displacement of the cord but without definite cord compression. The 
dominant component of the mass including the pleural/paraspinal component 
measures approximately 4.1 x 4.2 cm. There is associated osseous signal 
abnormality and enhancement involving portions of the T5 vertebral body and 
posterior elements. The thoracic cord is otherwise normal in signal intensity with incidental 
dilatation of the central canal at the mid thoracic levels. A right suprahilar 
mass measures approximately 4.4 x 4.7 cm corresponding to the known lung 
neoplasm. 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 
the T4-5 and T5-6 neural foramen. Epidural tumor at T5 slightly displaces the 
cord to the left however there is no definite cord compression. Most recent MRA Results from [facility] Encounter encounter on 02/17/21 MRI [facility] SPINE W WO CONT Narrative MRI thoracic spine with and without contrast 
 
HISTORY: Lung carcinoma. Paraspinal mass with spinal canal invasion noted on PET 
scanning. TECHNIQUE: Multiplanar multisequence imaging of the thoracic spine was obtained 
on a 1.5 Lima magnet, utilizing the uneventful administration of 19 mL of 
intravenous Dotarem in order to better evaluate for spinal pathology. COMPARISON: None. Correlation is made to the PET scan dated 02/16/2021. FINDINGS: The vertebral body height and alignment are well-maintained. The disc 
space heights are well-preserved.  
 
There is thickened and enhancing pleural nodularity within the visualized right 
hemithorax predominating at the mid and superior aspects. There is contiguous 
enhancement into the neural foramen of T5-6 where there is severe foraminal 
narrowing. Similar but less impressive findings are present at T4-5. There is 
abnormal enhancement extending into the right aspect of the spinal canal at the 
level of T5 measuring up to 2.6 cm in craniocaudal extent. There is mild 
leftward displacement of the cord but without definite cord compression. The 
dominant component of the mass including the pleural/paraspinal component 
measures approximately 4.1 x 4.2 cm. There is associated osseous signal 
abnormality and enhancement involving portions of the T5 vertebral body and 
posterior elements. The thoracic cord is otherwise normal in signal intensity with incidental 
dilatation of the central canal at the mid thoracic levels. A right suprahilar 
mass measures approximately 4.4 x 4.7 cm corresponding to the known lung 
neoplasm. 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 
the T4-5 and T5-6 neural foramen. Epidural tumor at T5 slightly displaces the 
cord to the left however there is no definite cord compression. Most recent CTA Results from [facility] Encounter encounter on 03/04/21 CT CODE NEURO HEAD WO CONTRAST Narrative Exam: CT CODE NEURO HEAD WO CONTRAST on 3/4/2021 11:53 AM 
 
Clinical History: The Male patient is 68years old  presenting for patient with 
acute neuro changes. Technique: Thin slice axial CT images through the brain were obtained. All CT scans at this facility are performed using dose reduction/dose modulation 
techniques, as appropriate the performed exam, including the following: Automated Exposure Control;  Adjustment of the mA and/or kV according to patient 
size (this includes techniques or standardized protocols for targeted exams 
where dose is matched to indication/reason for exam); and Use of Iterative Reconstruction Technique. Comparison:  Brain MRI 12/30/2020. Findings:   
 
Cerebrum: Age-related cortical involutional changes are seen. There is chronic 
periventricular white matter disease with lacunae throughout the basal ganglia. No evidence of intracranial hemorrhage, mass, or other space-occupying lesion is 
seen. There are no abnormal extra-axial fluid collections. Cerebellum: Involutional changes. CSF spaces: The ventricular system is within normal limits. The basilar cisterns 
are unremarkable. Brainstem: No evidence of ischemia, hemorrhage, or mass. Extracranial tissues: Visualized orbits and extracranial soft tissues are 
unremarkable. Paranasal sinuses/Mastoids: Well-pneumatized and aerated. Lorean Snare Calvarium: No acute osseous abnormality. Impression 1. Age-related senescent changes and chronic microvascular disease. If 
patient's symptoms persist, further evaluation with MRI should be considered. CPT code 02192 Most recent Echo No results found for this visit on 03/04/21. Most recent lipid panels No results found for: CHOL, CHOLPOCT, CHOLX, CHLST, CHOLV, HDL, HDLPOC, HDLP, LDL, LDLCPOC, LDLC, DLDLP, VLDLC, VLDL, TGLX, TRIGL, TRIGP, TGLPOCT, CHHD, CHHDX Most recent Hgb A1C No results found for: HBA1C, HGBE8, [id number], [id number] Impression Episodic confusion and soft focal neurologic signs occurring in a 77-year-old with a widespread underlying malignancy. Recommend MRI brain with and without contrast in terms of acute pathology the patient's recent PET CT scan did not demonstrate any skull base pathology but that technique does not image basically above the lateral ventricles I would obtain an electroencephalogram as the patient may be having ictal phenomena Echocardiogram in terms of the possibility of marantic endocarditis or possible valve based metastases would be appropriate.  
 
Our therapeutic options are however very limited in this gentleman Would institute aspirin Role of statins would probably be highly limited in this overall scenario Would not aggressively treat hypertension unless blood pressure persistently over 220 It is noted that the patient has significant leukocytosis also has renal failure may need to be addressed as they may be contributing to the confusional state Unequivocally, this patient must not drive and his license should be surrendered Rico Mueller MD

## 2021-03-04 NOTE — PROGRESS NOTES
03/04/21 1649 Dual Skin Pressure Injury Assessment Dual Skin Pressure Injury Assessment WDL  
martinez lower extremities, scattered ecchymosis, two scabs to knees, sacrum intact no reddness noted, skin tear to right elbow, accessed port to left side of chest

## 2021-03-04 NOTE — PROGRESS NOTES
03/04/21 1633 NIH Stroke Scale Interval Other (comment) (admission to 7th floor) LOC 1 LOC Questions 0 LOC Commands 0 Best Gaze 0 Visual 0 Facial Palsy 1 Motor Right Arm 0 Motor Left Arm 0 Motor Right Leg 1 Motor Left Leg 1 Limb Ataxia 0 Sensory 0 Best Language 0 Dysarthria 0 Extinction and Inattention 0 Total 4 Dual NIH with ER nurse Negar

## 2021-03-04 NOTE — PROGRESS NOTES
TRANSFER - IN REPORT: 
 
Verbal report received from Rashmi(name) on [facility]  being received from ED(unit) for routine progression of care Report consisted of patients Situation, Background, Assessment and  
Recommendations(SBAR). Information from the following report(s) SBAR was reviewed with the receiving nurse. Opportunity for questions and clarification was provided. Assessment completed upon patients arrival to unit and care assumed.

## 2021-03-04 NOTE — H&P
Hospitalist Note Admit Date:  3/4/2021 11:33 AM  
Name:  Leslie Espino Age:  68 y.o. 
:  1940 MRN:  [id number] PCP:  Rosalba Avalos MD 
Treatment Team: Attending Provider: Romana Roers; Primary Nurse: Jasen Farmer HPI/Subjective:  
 
68 male ex-smoker (quit in ), EtOH abuse (admits to daily drinking), PS 2 (lives independently with wife, able to do most activities with some help), history of hypothyroidism, hypertension/dyslipidemia, obese habitus, umbilical hernia status post repair. Most importantly he has stage IV lung adenocarcinoma, with associated malignant pleural effusion, mediastinal adenopathy and adrenal gland metastasis. Patient is getting chemotherapy and has recently had an adrenal biopsy. Regarding his cancer history: an Abnormal chest x-ray led to CT chest 2020 showing right paratracheal mass 5.8 x 9.1 cm, as well as very large right pleural effusion, bilateral adrenal gland nodules. Patient subsequently seen by Jeremy Love, and underwent diagnostic/therapeutic thoracentesis 2020: 2200 cc yellow fluid removed, cytology unfortunately shows presence of malignant cells with IHC consistent with lung adenocarcinoma. PET scan 2020 consistent with widely metastatic process including right sided mass, right pleural metastatic disease with effusion, mediastinal adenopathy, as well as large right adrenal metastasis. Patient was brought in today from cancer center due to inability to identify simple items. Additionally there was concern for right facial droop and right-sided tongue deviation which prompted a code stroke.   Signs and symptoms began yet yesterday Wednesday morning 8 AM.  Patient reportedly had an episode 2 weeks ago regarding unusual behavior were he exhibited atypical behavior by suddenly deciding to go to a grocery store at 11:45PM at night, and was thenceforth unable to be contacted until 3:30 in the morning upon which time he stated he thought he made a wrong turn. In the ER labs significant for leukocytosis 32.3 (patient on Decadron but this is higher than usual), potassium 5.0, glucose 237. Noncontrast CT head showed \"Age-related senescent changes and chronic microvascular disease. If 
patient's symptoms persist, further evaluation with MRI should be considered. \" 
 
Pt is currently encephalopathic and unable to contribute meaningfully to history and history obtained via chart review and d/w providers. He denies cough, abdominal pain, diarrhea, headache, blurry vision. [id number] systems reviewed and negative except as noted in HPI. Past Medical History:  
Diagnosis Date  Hypercholesterolemia  Hypertension   
 controlled with med  Morbid obesity ([facility] [address] 11/11/15 BMI- 36.1  Other ill-defined conditions(799.89)   
 high cholesterol  Primary malignant neoplasm of lung metastatic to other site [facility]) 2020  Thyroid disease   
 partial thyroid Past Surgical History:  
Procedure Laterality Date  HX COLONOSCOPY    
 HX HEENT    
 part of thyroid removed  HX HERNIA REPAIR  4336  
 umbilical  
 HX ORTHOPAEDIC Left   
 bunion removal on left great toe.  HX OTHER SURGICAL    
 thyroid removed (1)  IR INSERT TUNL CVC W PORT OVER 5 YEARS  2021 No Known Allergies Social History Tobacco Use  Smoking status: Former Smoker Packs/day: 1.50 Years: 30.00 Pack years: 45.00 Quit date: 1986 Years since quittin.1  Smokeless tobacco: Former User  Tobacco comment: quit  Substance Use Topics  Alcohol use: Yes Alcohol/week: 1.0 standard drinks Types: 1 Glasses of wine per week Comment: daily Family History Problem Relation Age of Onset  Heart Disease Brother 48 MI  
 Alzheimer Mother  Heart Disease Brother  Pacemaker Brother  Diabetes Sister  Alzheimer Sister  Alcohol abuse Brother  Alzheimer Brother  Diabetes Brother  Alcohol abuse Brother  Alzheimer Brother Family history reviewed and noncontributory. There is no immunization history on file for this patient. PTA Medications: 
Prior to Admission Medications Prescriptions Last Dose Informant Patient Reported? Taking? Cholecalciferol, Vitamin D3, (VITAMIN D3) 1,000 unit cap   Yes No  
Sig: Take  by mouth every morning. HYDROcodone-acetaminophen (NORCO) 5-325 mg per tablet   No No  
Sig: Take 1 Tab by mouth every six (6) hours as needed for Pain for up to 30 days. Max Daily Amount: 4 Tabs. Hydrochlorothiazide (HYDRODIURIL) 12.5 mg tablet   Yes No  
Sig: Take 12.5 mg by mouth every morning. POTASSIUM (POTASSIMIN PO)   Yes No  
Sig: Take  by mouth every morning. Thyroid, Pork, (ARMOUR THYROID) 120 mg Tab   Yes No  
Sig: Take 150 mg by mouth daily. Takes 1 (120 mg) + 1 (30 mg) tab daily  
amLODIPine (NORVASC) 5 mg tablet   Yes No  
Sig: Take 5 mg by mouth daily. cyanocobalamin (VITAMIN B-12) 1,000 mcg sublingual tablet   Yes No  
Sig: Take 1,000 mcg by mouth every morning. dexAMETHasone (DECADRON) 4 mg tablet   No No  
Sig: One tab three times a day  
folic acid (FOLVITE) 1 mg tablet   No No  
Sig: Take 1 Tab by mouth daily. furosemide (LASIX) 20 mg tablet   No No  
Sig: Take 1 Tab by mouth daily. Indications: accumulation of fluid resulting from chronic heart failure  
lidocaine-prilocaine (EMLA) topical cream   No No  
Sig: Apply a dab to port site 30-45 min prior to labs/infusion appt. Cover with saran wrap  
ondansetron (ZOFRAN ODT) 8 mg disintegrating tablet   No No  
Sig: Take 1 Tab by mouth every eight (8) hours as needed for Nausea or Vomiting. Indications: prevent nausea and vomiting from cancer chemotherapy  
predniSONE (DELTASONE) 10 mg tablet   No No  
Sig: Take 90 mg by mouth daily (with breakfast).   
prochlorperazine (Compazine) 10 mg tablet   No No  
Sig: Take 1 Tab by mouth every six (6) hours as needed for Nausea or Vomiting for up to 30 days. Indications: nausea and vomiting caused by cancer drugs Facility-Administered Medications: None Objective:  
 
Patient Vitals for the past 24 hrs: 
 Pulse BP SpO2  
03/04/21 1320  (!) 171/96 96 % 03/04/21 1251 (!) 105 (!) 168/84 97 % 03/04/21 1201 (!) 103  96 % 03/04/21 1151  (!) 178/90  Oxygen Therapy O2 Sat (%): 96 % (03/04/21 1320) Pulse via Oximetry: 106 beats per minute (03/04/21 1320) Estimated body mass index is 32.26 kg/m² as calculated from the following: 
  Height as of 2/23/21: 5' 7\" (1.702 m). Weight as of 2/23/21: 93.4 kg (206 lb). No intake or output data in the 24 hours ending 03/04/21 1401 *Note that automatically entered I/Os may not be accurate; dependent on patient compliance with collection and accurate  by assistants. Physical Exam: 
General:    Blank stare, inattentive, AO x 1 (not to place or time). Requires frequent redirection Eyes:   Normal sclerae. Extraocular movements intact. HENT:  Normocephalic, atraumatic. Moist mucous membranes CV:   RRR. No m/r/g. Lungs:  CTAB. No wheezing, rhonchi, or rales. Abdomen: Soft, nontender, positive distention. Extremities: Warm and dry. No cyanosis or edema. Intention tremor Neurologic: CN II-XII grossly intact. Sensation intact. Skin:     No rashes or jaundice. Normal coloration Psych:  Normal mood and affect. I reviewed the labs, imaging, EKGs, telemetry, and other studies done this admission. Data Review:  
Recent Results (from the past 24 hour(s)) CBC WITH AUTOMATED DIFF Collection Time: 03/04/21 11:40 AM  
Result Value Ref Range WBC 32.3 (H) 4.3 - 11.1 K/uL  
 RBC 4.97 4.23 - 5.6 M/uL  
 HGB 14.6 13.6 - 17.2 g/dL HCT 43.5 41.1 - 50.3 % MCV 87.5 79.6 - 97.8 FL  
 MCH 29.4 26.1 - 32.9 PG  
 MCHC 33.6 31.4 - 35.0 g/dL  
 RDW 19.8 (H) 11.9 - 14.6 % PLATELET 992 731 - 392 K/uL MPV 10.0 9.4 - 12.3 FL ABSOLUTE NRBC 0.00 0.0 - 0.2 K/uL NEUTROPHILS 87 (H) 43 - 78 % LYMPHOCYTES 1 (L) 13 - 44 % MONOCYTES 9 4.0 - 12.0 % EOSINOPHILS 0 (L) 0.5 - 7.8 % BASOPHILS 0 0.0 - 2.0 % IMMATURE GRANULOCYTES 3 0.0 - 5.0 %  
 ABS. NEUTROPHILS 28.1 (H) 1.7 - 8.2 K/UL  
 ABS. LYMPHOCYTES 0.3 (L) 0.5 - 4.6 K/UL  
 ABS. MONOCYTES 2.9 (H) 0.1 - 1.3 K/UL  
 ABS. EOSINOPHILS 0.0 0.0 - 0.8 K/UL  
 ABS. BASOPHILS 0.0 0.0 - 0.2 K/UL  
 ABS. IMM. GRANS. 1.0 (H) 0.0 - 0.5 K/UL  
 RBC COMMENTS RARE 
SCHISTOCYTES 
    
 RBC COMMENTS SLIGHT 
ANISOCYTOSIS 
    
 WBC COMMENTS Result Confirmed By Smear PLATELET COMMENTS ADEQUATE    
 DF AUTOMATED METABOLIC PANEL, BASIC Collection Time: 03/04/21 11:40 AM  
Result Value Ref Range Sodium 139 138 - 145 mmol/L Potassium 5.0 3.5 - 5.1 mmol/L Chloride 107 98 - 107 mmol/L  
 CO2 24 21 - 32 mmol/L Anion gap 8 7 - 16 mmol/L Glucose 237 (H) 65 - 100 mg/dL BUN 53 (H) 8 - 23 MG/DL Creatinine 0.91 0.8 - 1.5 MG/DL  
 GFR est AA >60 >60 ml/min/1.73m2 GFR est non-AA >60 >60 ml/min/1.73m2 Calcium 8.6 8.3 - 10.4 MG/DL PROTHROMBIN TIME + INR Collection Time: 03/04/21 11:40 AM  
Result Value Ref Range Prothrombin time 14.0 12.5 - 14.7 sec INR 1.1 TROPONIN-HIGH SENSITIVITY Collection Time: 03/04/21 11:40 AM  
Result Value Ref Range Troponin-High Sensitivity 84.1 (H) 0 - 14 pg/mL EKG, 12 LEAD, INITIAL Collection Time: 03/04/21 12:06 PM  
Result Value Ref Range Ventricular Rate 101 BPM  
 Atrial Rate 107 BPM  
 QRS Duration 104 ms Q-T Interval 404 ms QTC Calculation (Bezet) 523 ms Calculated R Axis 84 degrees Calculated T Axis 32 degrees Diagnosis    
  !! AGE AND GENDER SPECIFIC ECG ANALYSIS !! Atrial fibrillation with rapid ventricular response Incomplete right bundle branch block Right ventricular hypertrophy with repolarization abnormality Abnormal ECG When compared with ECG of 04-MAR-2021 12:04, No significant change was found All Micro Results None No current facility-administered medications for this encounter. Current Outpatient Medications Medication Sig  
 dexAMETHasone (DECADRON) 4 mg tablet One tab three times a day  folic acid (FOLVITE) 1 mg tablet Take 1 Tab by mouth daily.  predniSONE (DELTASONE) 10 mg tablet Take 90 mg by mouth daily (with breakfast).  prochlorperazine (Compazine) 10 mg tablet Take 1 Tab by mouth every six (6) hours as needed for Nausea or Vomiting for up to 30 days. Indications: nausea and vomiting caused by cancer drugs  ondansetron (ZOFRAN ODT) 8 mg disintegrating tablet Take 1 Tab by mouth every eight (8) hours as needed for Nausea or Vomiting. Indications: prevent nausea and vomiting from cancer chemotherapy  lidocaine-prilocaine (EMLA) topical cream Apply a dab to port site 30-45 min prior to labs/infusion appt. Cover with saran wrap  
 HYDROcodone-acetaminophen (NORCO) 5-325 mg per tablet Take 1 Tab by mouth every six (6) hours as needed for Pain for up to 30 days. Max Daily Amount: 4 Tabs.  furosemide (LASIX) 20 mg tablet Take 1 Tab by mouth daily. Indications: accumulation of fluid resulting from chronic heart failure  amLODIPine (NORVASC) 5 mg tablet Take 5 mg by mouth daily.  cyanocobalamin (VITAMIN B-12) 1,000 mcg sublingual tablet Take 1,000 mcg by mouth every morning.  POTASSIUM (POTASSIMIN PO) Take  by mouth every morning.  Hydrochlorothiazide (HYDRODIURIL) 12.5 mg tablet Take 12.5 mg by mouth every morning.  Cholecalciferol, Vitamin D3, (VITAMIN D3) 1,000 unit cap Take  by mouth every morning.  Thyroid, Pork, (ARMOUR THYROID) 120 mg Tab Take 150 mg by mouth daily. Takes 1 (120 mg) + 1 (30 mg) tab daily Other Studies: No results found for this visit on 03/04/21. Xr Chest Pa Lat Result Date: 3/4/2021 Exam: XR CHEST PA LAT on 3/4/2021 12:55 PM Clinical History: The Male patient is 68years old  presenting for chest pain. Comparison:  Chest x-ray 1/6/2021 Findings:  Frontal and lateral views of the chest were obtained. There is minimal residual right apical and lateral pleural thickening however with a resolved right basilar effusion. Underlying changes of COPD are seen with right upper lobe paramediastinal mass having decreased in size to 6.7 x 5.0 cm. Left subclavian chest port is in place. The cardiomediastinal silhouette is within normal limits. There are no acute osseous abnormalities. 1. Interval slightly decreasing right upper lobe paramediastinal mass with resolved right basilar effusion however residual pleural thickening along right apical lateral lung segment. CPT code(s) 11249 Ct Code Neuro Head Wo Contrast 
 
Result Date: 3/4/2021 Exam: CT CODE NEURO HEAD WO CONTRAST on 3/4/2021 11:53 AM Clinical History: The Male patient is 68years old  presenting for patient with acute neuro changes. Technique: Thin slice axial CT images through the brain were obtained. All CT scans at this facility are performed using dose reduction/dose modulation techniques, as appropriate the performed exam, including the following: Automated Exposure Control; Adjustment of the mA and/or kV according to patient size (this includes techniques or standardized protocols for targeted exams where dose is matched to indication/reason for exam); and Use of Iterative Reconstruction Technique. Comparison:  Brain MRI 12/30/2020. Findings:  Cerebrum: Age-related cortical involutional changes are seen. There is chronic periventricular white matter disease with lacunae throughout the basal ganglia. No evidence of intracranial hemorrhage, mass, or other space-occupying lesion is seen. There are no abnormal extra-axial fluid collections. Cerebellum: Involutional changes. CSF spaces: The ventricular system is within normal limits. The basilar cisterns are unremarkable.  Brainstem: No evidence of ischemia, hemorrhage, or mass. Extracranial tissues: Visualized orbits and extracranial soft tissues are unremarkable. Paranasal sinuses/Mastoids: Well-pneumatized and aerated. . Calvarium: No acute osseous abnormality. 1.  Age-related senescent changes and chronic microvascular disease. If patient's symptoms persist, further evaluation with MRI should be considered. CPT code 39042 Assessment and Plan:  
 
Hospital Problems as of 3/4/2021 Date Reviewed: 3/4/2021 None 74-year-old male history of metastatic lung cancer presents with acute right facial droop and waxing and waning confusion, admitted for encephalopathy, rule out stroke and leukocytosis of unclear origin Encephalopathy with very mild right facial droop 
-Check TSH, ammonia level, EEG, MRI brain with and without contrast, prolactin. B12 
-Code stroke protocol implemented: PT OT speech therapy eval. echo with bubble study. Defer neurovascular imaging unless neurology feels this is warranted. Leukocytosis 
-Unclear if occult infection or steroid related 
-check lactic acid, blood cultures, urine cultures, procalcitonin 
-Abdomen distended, check abdominal ultrasound 
-Empiric antibiotics until cultures negativeRalph Zosyn 
-Steroids can blunt febrile response to infection Stage IV lung adenocarcinoma-oncology on board Chronic/prior EtOH abuse (admits to daily drinking), PS 2 (lives independently with wife, able to do most activities with some help), history of hypothyroidism, hypertension/dyslipidemia, obese habitus 
-Continue PTA meds once passes speech evaluation-appears unsafe to swallow currently 
-Rest of plan per hospital course Discharge planning: DVT ppx ordered Lovenox Code status:  Full Estimated LOS:  Greater than 2 midnights Risk:  high Signed: 
Hari Hogan MD

## 2021-03-04 NOTE — PROGRESS NOTES
3/4/2021  Patient seen with Deysi Roldan NP for an office visit. Upon entering the room NP noticed the right sided facial drooping, difficulty speaking, inability to shrug shoulders and spouse stated patient could not stand at all. 911 immediately called for transport to [address] [place] [address] ED. Report called to YULISA Pennington from [address] [place] [address] ED.

## 2021-03-04 NOTE — ED NOTES
TRANSFER - OUT REPORT: 
 
Verbal report given to YULISA Santillan(name) on Dale Estrada  being transferred to [facility](unit) for routine progression of care    
 
Report consisted of patient’s Situation, Background, Assessment and  
Recommendations(SBAR).  
 
Information from the following report(s) SBAR was reviewed with the receiving nurse. 
 
Lines:  
Venous Access Device 01/07/21 Upper chest (subclavicular area), left (Active)  
  
 
Opportunity for questions and clarification was provided.   
 
Patient transported with: 
 Registered Nurse

## 2021-03-04 NOTE — ED PROVIDER NOTES
Patient is a 77-year-old male presenting to the emergency department today from oncology where they noticed a left-sided tongue deviation and right-sided facial drooping. The patient is following with him for adenocarcinoma of the right lung. According to wife he normally walks on an assisted and today even with 2 people trying to pull him up out of a chair he could not get up. The patient is awake alert and oriented but slow to respond which according to wife is unusual.  He says that he was normal this morning when he got up but his wife says his weakness has been progressive over 2 days. Past Medical History:  
Diagnosis Date  Hypercholesterolemia  Hypertension   
 controlled with med  Morbid obesity ([facility] [address] 11/11/15 BMI- 36.1  Other ill-defined conditions(799.89)   
 high cholesterol  Primary malignant neoplasm of lung metastatic to other site [facility]) 12/28/2020  Thyroid disease   
 partial thyroid Past Surgical History:  
Procedure Laterality Date  HX COLONOSCOPY    
 HX HEENT    
 part of thyroid removed  HX HERNIA REPAIR  7642  
 umbilical  
 HX ORTHOPAEDIC Left   
 bunion removal on left great toe.  HX OTHER SURGICAL    
 thyroid removed (1)  IR INSERT TUNL CVC W PORT OVER 5 YEARS  1/7/2021 Family History:  
Problem Relation Age of Onset  Heart Disease Brother 48 MI  
 Alzheimer Mother  Heart Disease Brother  Pacemaker Brother  Diabetes Sister  Alzheimer Sister  Alcohol abuse Brother  Alzheimer Brother  Diabetes Brother  Alcohol abuse Brother  Alzheimer Brother Social History Socioeconomic History  Marital status:  Spouse name: Not on file  Number of children: Not on file  Years of education: Not on file  Highest education level: Not on file Occupational History  Not on file Social Needs  Financial resource strain: Not on file  Food insecurity Worry: Not on file Inability: Not on file  Transportation needs Medical: Not on file Non-medical: Not on file Tobacco Use  Smoking status: Former Smoker Packs/day: 1.50 Years: 30.00 Pack years: 45.00 Quit date: 1986 Years since quittin.1  Smokeless tobacco: Former User  Tobacco comment: quit  Substance and Sexual Activity  Alcohol use: Yes Alcohol/week: 1.0 standard drinks Types: 1 Glasses of wine per week Comment: daily  Drug use: No  
 Sexual activity: Not on file Lifestyle  Physical activity Days per week: Not on file Minutes per session: Not on file  Stress: Not on file Relationships  Social connections Talks on phone: Not on file Gets together: Not on file Attends [employer] service: Not on file Active member of club or organization: Not on file Attends meetings of clubs or organizations: Not on file Relationship status: Not on file  Intimate partner violence Fear of current or ex partner: Not on file Emotionally abused: Not on file Physically abused: Not on file Forced sexual activity: Not on file Other Topics Concern  Not on file Social History Narrative  Not on file ALLERGIES: Patient has no known allergies. Review of Systems Neurological: Positive for facial asymmetry, speech difficulty and weakness. All other systems reviewed and are negative. Vitals:  
 21 1151 21 1201 21 1251 21 1320 BP: (!) 178/90  (!) 168/84 (!) 171/96 Pulse:  (!) 103 (!) 105 SpO2:  96% 97% 96% Physical Exam  
 
GENERAL:The patient has There is no height or weight on file to calculate BMI. Well-hydrated. Ill-appearing. VITAL SIGNS: Heart rate, blood pressure, respiratory rate reviewed as recorded in 
nurse's notes EYES: Pupils reactive. Extraocular motion intact. No conjunctival redness or drainage. EARS: No external masses or lesions. NOSE: No nasal drainage or epistaxis. MOUTH/THROAT: Pharynx clear; airway patent. NECK: Supple, no meningeal signs. Trachea midline. No masses or thyromegaly. LUNGS: Breath sounds clear and equal bilaterally no accessory muscle use CHEST: No deformity CARDIOVASCULAR: Regular rate and rhythm EXTREMITIES: No clubbing or cyanosis. No joint swelling. Normal muscle tone. No 
restricted range of motion appreciated. NEUROLOGIC: Patient has right-sided facial drooping with deviation of the tongue to the left. He is slow to respond verbally. The patient does not have any pronator drift in the arms. He is unable to raise his legs off the stretcher against gravity. His  strength is equal bilaterally. No decreased sensation in V1 through 3 noted. SKIN: No rash or petechiae. Good skin turgor palpated. PSYCHIATRIC: Alert and oriented. Appropriate behavior and judgment. MDM Number of Diagnoses or Management Options Adenocarcinoma, lung, right ([address] Cerebrovascular accident (CVA), unspecified mechanism ([address] Diagnosis management comments: TIA, CVA, intracranial hemorrhage, ischemic stroke, brain tumor, Bell's palsy, 
 
tension headache, migraine headache, 
 
peripheral neuropathy, metabolic disorder, Guillian Comanche syndrome, multiple sclerosis, 
 
electrolyte abnormality 
 
seizure, pseudoseizures, status epilepticus, malingering 
 
meningitis, encephalitis, Amount and/or Complexity of Data Reviewed Clinical lab tests: reviewed and ordered Tests in the radiology section of CPT®: reviewed and ordered Tests in the medicine section of CPT®: reviewed and ordered Obtain history from someone other than the patient: yes Review and summarize past medical records: yes Discuss the patient with other providers: yes Independent visualization of images, tracings, or specimens: yes ED Course as of Mar 04 1334 Thu Mar 04, 2021 1155 Dr. Rosalina Pool saw the patient and does not feel that the patient meets criteria for TPA or intervention at this time. Bee Hauser 1155 WBC(!): 32.3 [KH] 1206 IMPRESSION 
  
1.  Age-related senescent changes and chronic microvascular disease. If 
patient's symptoms persist, further evaluation with MRI should be considered. CT CODE NEURO HEAD WO CONTRAST [KH] 1325 XR CHEST PA LAT [KH] 1325 IMPRESSION 
  
1. Interval slightly decreasing right upper lobe paramediastinal mass with 
resolved right basilar effusion however residual pleural thickening along right 
apical lateral lung segment. XR CHEST PA LAT [KH] 1331 The hospitalist has been notified and Dr. Diane Bowen will come and see the patient. Bee Hauser ED Course User Index [] Curt Mcpherson DO  
 
 
Critical Care Performed by: Curt Mcpherson DO Authorized by: Curt Mcpherson DO Comments:  
   Critical care time: 94 minutes of critical care time was performed in the emergency department. This was separate from any other procedures listed during the patients emergency department course. The failure to initiate these interventions on an urgent basis would likely have resulted in sudden, clinically significant or life-threatening deterioration in the patients condition.

## 2021-03-04 NOTE — ED TRIAGE NOTES
Patient arrived via EMS from cancer center. Patient unable to identify simple items. Right sided facial droop, right sided tongue deviation. EMS reports wife stated these s/s began wed morning around 0800.

## 2021-03-04 NOTE — PROCEDURES
Routing refill request to provider for review/approval because:  Drug not on the [facility] refill protocol for associated symptoms (URI)    ZULEMA AbrahamN, RN  [facility]       This electroencephalogram is performed on a multichannel digital EEG device with the patient awake and drowsy dermal electrodes are employed. The International 10-20 electrode recording system is used. As the tracing begins there is no specific well-regulated background alpha activity and activity in the theta range is noted predominantly. There are no basic asymmetries noted on the transverse electrode array there is a good deal of beta activity consistent with medication type artifact. Patient becomes drowsy and central slowing is apparent. With drowsiness and sleep sleep spindles are identified I note no paroxysmal dysrhythmia although in the latter portion of the recording there is the occurrence of high voltage artifact which obscures the background rhythm Impression The study is abnormal and reveals evidence of a diffuse generalized degree of electrocortical dysfunction while there are no specific ictal events appreciated the study does not exclude seizures no evidence of localized dysfunction

## 2021-03-05 NOTE — PROGRESS NOTES
Neurology Daily Progress Note Assessment:  
 
68year old male presenting with stroke like symptoms. Code S yesterday, presented with AMS and right facial droop, resolved. Likely secondary to acute metabolic encephalopathy. 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 EEG showed generalized diffuse slowing, negative for seizure. MRI brain negative for acute infarct, hemorrhage, metastatic lesion, or limbic encephalopathy. Leukocytosis with WBC 27.2 this morning, likely due to steroids v. Infection. UA, C&S pending. TTE pending. Plan:  
 
· Continue ASA 81 mg daily · Continue high intensity statin · Continue to correct metabolic or infectious etiologies. · Continue IV antibiotics per primary team. 
· Neurochecks Q4H 
· TTE pending. · Telemetry 
· PT/OT/ST 
· DVT prophylaxis · BP management - normotensive, with long-term goal <140/90 · Smoking cessation if applicable · Diabetes education if applicable · Depression Screening prior to discharge Subjective: Interval history: 
 
Alert and oriented to person, place. Able to follow commands. Working with PT, ambulating in room with walker. Short steppage gait noted. EEG showed generalized diffuse slowing, negative for seizure. MRI of brain negative for acute stroke, hemorrhage, or lesion. Leukocytosis with WBC 27.2 this morning. UA, C&S pending. Continue IV antibiotics per primary team. TTE pending. History: 
 
Ann Durán is a 68 y.o. male who is being seen for stroke like symptoms. Review of systems negative with exception of pertinent positives and negatives noted above. Objective:  
 
Vitals:  
 03/05/21 0021 03/05/21 0400 03/05/21 0800 03/05/21 1003 BP:  (!) 147/81 133/80 Pulse: 98 76 94 Resp:  18 16 Temp:  97.4 °F (36.3 °C) 97.5 °F (36.4 °C) SpO2:  94% 96% 97% Weight:      
Height:      
  
 
 
Current Facility-Administered Medications:   sodium chloride (NS) flush 5-40 mL, 5-40 mL, IntraVENous, Q8H, Gemma Benji, NP 
  sodium chloride (NS) flush 5-40 mL, 5-40 mL, IntraVENous, PRN, Priscila Leblanc, NP 
  LORazepam (ATIVAN) tablet 0.5 mg, 0.5 mg, Oral, Q1H PRN, Priscila Leblanc, NP 
  folic acid (FOLVITE) tablet 1 mg, 1 mg, Oral, DAILY, Priscila Sandovalo, NP 
  thiamine HCL (B-1) tablet 100 mg, 100 mg, Oral, DAILY, Gemma [place], NP 
  multivitamin, tx-iron-ca-min (THERA-M w/ IRON) tablet 1 Tab, 1 Tab, Oral, DAILY, Priscila Leblanc NP 
  tuberculin injection 5 Units, 5 Units, IntraDERMal, Be Zamorano MD, 5 Units at 03/04/21 1849 
  sodium chloride (NS) flush 5-40 mL, 5-40 mL, IntraVENous, Q8H, Tiff Guaman MD, 10 mL at 03/05/21 2813   sodium chloride (NS) flush 5-40 mL, 5-40 mL, IntraVENous, PRN, Tiff Guaman MD 
  [facility] injection 4 mg, 4 mg, IntraVENous, Q4H PRN, Tiff Guaman MD 
  aspirin chewable tablet 81 mg, 81 mg, Oral, DAILY, Tiff Guaman MD, 81 mg at 03/05/21 6940   atorvastatin (LIPITOR) tablet 40 mg, 40 mg, Oral, QHS, Tiff Guaman MD, Stopped at 03/04/21 2200   acetaminophen (TYLENOL) tablet 650 mg, 650 mg, Oral, Q4H PRN, Tiff Guaman MD 
  labetaloL (NORMODYNE;TRANDATE) injection 5 mg, 5 mg, IntraVENous, Q10MIN PRN, Tiff Guaman MD 
  polyethylene glycol (MIRALAX) packet 17 g, 17 g, Oral, DAILY PRN, Tiff Guaman MD 
  enoxaparin (LOVENOX) injection 40 mg, 40 mg, SubCUTAneous, Q24H, Tiff Guaman MD, 40 mg at 03/04/21 1849 
  insulin lispro (HUMALOG) injection 0-10 Units, 0-10 Units, SubCUTAneous, TIDAC, Tiff Guaman MD, Stopped at 03/05/21 0730 Recent Results (from the past 12 hour(s)) T4, FREE Collection Time: 03/04/21 11:50 PM  
Result Value Ref Range T4, Free 1.3 0.9 - 1.8 NG/DL  
CBC W/O DIFF Collection Time: 03/05/21  4:25 AM  
Result Value Ref Range WBC 27.2 (H) 4.3 - 11.1 K/uL  
 RBC 4.36 4.23 - 5.6 M/uL HGB 12.9 (L) 13.6 - 17.2 g/dL HCT 39.0 (L) 41.1 - 50.3 % MCV 89.4 79.6 - 97.8 FL  
 MCH 29.6 26.1 - 32.9 PG  
 MCHC 33.1 31.4 - 35.0 g/dL  
 RDW 19.8 (H) 11.9 - 14.6 % PLATELET 683 523 - 220 K/uL MPV 10.2 9.4 - 12.3 FL ABSOLUTE NRBC 0.00 0.0 - 0.2 K/uL LIPID PANEL Collection Time: 03/05/21  4:25 AM  
Result Value Ref Range LIPID PROFILE Cholesterol, total 161 <200 MG/DL Triglyceride 247 (H) 35 - 150 MG/DL  
 HDL Cholesterol 74 (H) 40 - 60 MG/DL  
 LDL, calculated 37.6 <100 MG/DL VLDL, calculated 49.4 (H) 6.0 - 23.0 MG/DL  
 CHOL/HDL Ratio 2.2 HEMOGLOBIN A1C WITH EAG Collection Time: 03/05/21  4:25 AM  
Result Value Ref Range Hemoglobin A1c 6.9 (H) 4.20 - 6.30 % Est. average glucose 151 mg/dL METABOLIC PANEL, BASIC Collection Time: 03/05/21  4:25 AM  
Result Value Ref Range Sodium 141 138 - 145 mmol/L Potassium 4.4 3.5 - 5.1 mmol/L Chloride 109 (H) 98 - 107 mmol/L  
 CO2 29 21 - 32 mmol/L Anion gap 3 (L) 7 - 16 mmol/L Glucose 154 (H) 65 - 100 mg/dL BUN 41 (H) 8 - 23 MG/DL Creatinine 0.73 (L) 0.8 - 1.5 MG/DL  
 GFR est AA >60 >60 ml/min/1.73m2 GFR est non-AA >60 >60 ml/min/1.73m2 Calcium 8.3 8.3 - 10.4 MG/DL  
GLUCOSE, POC Collection Time: 03/05/21  7:33 AM  
Result Value Ref Range Glucose (POC) 157 (H) 65 - 100 mg/dL MRI Results (most recent): 
Results from Hospital Encounter encounter on 03/04/21 MRI BRAIN W WO CONT Narrative MRI BRAIN WITHOUT and WITH CONTRAST. HISTORY:  Acute confusion right-sided facial droop. Small cell lung carcinoma. COMPARISON:  None. Study performed within 24 hours of admission. TECHNIQUE:  Sagittal T1, axial T1, T2, FLAIR, gradient echo, diffusion with ADC 
map were followed by 19cc intravenous gadolinium after which axial and coronal 
T1 images were repeated. Intravenous contrast was given to increase specificity 
of T2 abnormalities.    
 
FINDINGS:  
 -Diffusion images: No any areas of restricted diffusion to suggest acute 
infarction.   
-No midline shift, mass or mass effect. -Gradient echo images: are unremarkable. -FLAIR sequence images: Fairly extensive bilateral centrum semiovale and corona 
radiata white matter hyperintensities. 
-No evidence of acute hemorrhage.   
-Lateral ventricles are: normal size.   
-Pituitary and parasellar structures: unremarkable on the sagittal T1 images.   
-There are normal T2 flow-voids in the major vessels.    
-Posterior fossa structures are unremarkable.   
-Basal ganglia: appear symmetric.   
-Orbits: are grossly normal.  
-Paranasal sinuses: are clear. 
-Contrast: No enhancing nodules or masses. 
-Other: None. Impression 1) Negative for infarction. 2) Negative for metastatic disease. No enhancing lesions. 3) Nonspecific, nonenhancing FLAIR white matter hyperintensities, likely chronic 
small vessel disease. Physical Exam: 
General - Elderly, well nourished, in no apparent distress. Pleasant and conversent. HEENT - Normocephalic, atraumatic. Conjunctiva are clear. Neck - Supple without masses Cardiovascular - Regular rate and rhythm. Normal S1, S2 without murmurs, rubs, or gallops. Lungs - Clear to auscultation. Abdomen - Soft, nontender with normal bowel sounds. Extremities - Peripheral pulses intact. No edema and no rashes. Neurological examination - Comprehension, attention, memory and reasoning are intact. Language and speech are normal.  
On cranial nerve examination, (II, III, IV, VI) pupils are equal, round, and reactive to light. Visual acuity is adequate. Visual fields are full to finger confrontation. Extraocular motility is normal. (V, VII) Face is symmetric and sensation is intact to light touch. (VIII) Hearing is intact. (IX, X) Palate and uvula elevate symmetrically. Voice is normal. (XI) Shoulder shrug is strong and equal bilaterally. (XII)Tongue is midline. Motor examination - There is normal muscle tone and bulk. Moves all extremities spontaneously and to command. Muscle stretch reflexes are normoactive and there are no pathological reflexes present. Plantar response is flexor. Sensation is intact to light touch, pinprick, in all extremities. Cerebellar examination is normal. Gait and stance unsteady, ambulating with walker, short steppage gait. 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 Electroencephalogram of yesterday did not demonstrate any ictal disturbance. Recommendations continue to be to avoid driving as he is clearly unsafe and would have inadequate reflexes in terms of that activity

## 2021-03-05 NOTE — PROGRESS NOTES
Provider Prudence Religious) ordered strict NPO, effective immediately until cleared for swallowing study.

## 2021-03-05 NOTE — PROGRESS NOTES
SPEECH PATHOLOGY NOTE: 
 
Speech therapy consult received and appreciated. Attempted to see patient this AM for bedside swallow evaluation, however echo in progress. Will check back at later time.  [address]é [address] CCC-SLP

## 2021-03-05 NOTE — PROGRESS NOTES
[employer] Hospitalist Service Progress Note INTERVAL HISTORY  / Subjective: 
29-year-old male history of metastatic lung cancer presents with acute right facial droop and waxing and waning confusion, admitted for encephalopathy, rule out stroke and leukocytosis of unclear origin 3/5 More alert today, no signs of ETOH withdrawal.  
 
Assessment / Plan: 
Encephalopathy with very mild right facial droop 
-Check TSH, ammonia level, EEG, MRI brain with and without contrast no  Acute findings, . B12 noted 2000 
- PT OT speech therapy eval. echo with bubble study. Defer neurovascular imaging unless neurology feels this is warranted. 
  
Leukocytosis 
-Unclear if occult infection or steroid related 
- lactic acid normal, blood cultures NGTD, urine cultures, procalcitonin negative 
-Abdomen distended, c abdominal ultrasound 
-Empiric antibiotics until cultures negativeVanc,  Zosyn 
-Steroids can blunt febrile response to infection 3/5 holding ABT for now, UA pending, US abdomen showed distended bladder but per nursing, large void after getting back to unit. Likely related to Decadron 
  
Stage IV lung adenocarcinoma- 
oncology on board 
  
EtOH abuse 
 (admits to daily drinking), PS 2 (lives independently with wife, able to do most activities with some help) Thiamine, Folic acid, Multivitamin Ativan prn 
3/5 calm, cooperative 
 
 hypothyroidism Home medications Hypertension Home medications Dyslipidemia High dose statin 
 
obese habitus 
-Continue PTA meds once passes speech evaluation-appears unsafe to swallow currently 
-Rest of plan per hospital course Chief Complaint : Facial Droop Objective: 
Visit Vitals /80 (BP 1 Location: Right upper arm, BP Patient Position: At rest) Pulse 94 Temp 97.5 °F (36.4 °C) Resp 16 Ht 5' 7\" (1.702 m) Wt 93.4 kg (205 lb 14.6 oz) SpO2 96% BMI 32.25 kg/m² Physical Exam: General:          Blank stare, inattentive, AO x 1 (not to place or time). Requires frequent redirection Eyes:               Normal sclerae. Extraocular movements intact. HENT:             Normocephalic, atraumatic. Moist mucous membranes CV:                  RRR. No m/r/g. Lungs:             CTAB. No wheezing, rhonchi, or rales. Abdomen:        Soft, nontender, positive distention. Extremities:     Warm and dry. No cyanosis or edema. Intention tremor Neurologic:      CN II-XII grossly intact. Sensation intact. Skin:                No rashes or jaundice. Normal coloration Psych:             Normal mood and affect. Intake and Output: 
Date 03/04/21 0700 - 03/05/21 6827 03/05/21 0700 - 03/06/21 6399 Shift [id number] 1900-0659 24 Hour Total 9203-0495 6489-1031 24 Hour Total  
INTAKE Shift Total(mL/kg) OUTPUT Urine(mL/kg/hr)    75  75 Urine Voided    75  75 Shift Total(mL/kg)    75(0.8)  75(0.8) NET    -75  -75 Weight (kg) 93.4 93.4 93.4 93.4 93.4 93.4 LAB: 
Admission on 03/04/2021 Component Date Value  Ventricular Rate 03/04/2021 101  Atrial Rate 03/04/2021 107  QRS Duration 03/04/2021 104  Q-T Interval 03/04/2021 404  QTC Calculation (Bezet) 03/04/2021 523  Calculated R Axis 03/04/2021 84   
 Calculated T Axis 03/04/2021 32   
 Diagnosis 03/04/2021 Value:!! AGE AND GENDER SPECIFIC ECG ANALYSIS !! Sinus tachycardia with 1st degree A-V block Incomplete right bundle branch block Right ventricular hypertrophy with repolarization abnormality Abnormal ECG When compared with ECG of 04-MAR-2021 12:04, No significant change was found Confirmed by Gregg Perera MD (), Radha Diego ([id number]) on 3/4/2021 3:45:58 PM 
  
 WBC 03/04/2021 32.3*  
 RBC 03/04/2021 4.97   
 HGB 03/04/2021 14.6   
 HCT 03/04/2021 43.5  MCV 03/04/2021 87.5  MCH 03/04/2021 29.4  MCHC 03/04/2021 33.6  RDW 03/04/2021 19.8*  
  PLATELET 77/24/6670 540  MPV 03/04/2021 10.0  ABSOLUTE NRBC 03/04/2021 0.00   
 NEUTROPHILS 03/04/2021 87*  
 LYMPHOCYTES 03/04/2021 1*  
 MONOCYTES 03/04/2021 9   
 EOSINOPHILS 03/04/2021 0*  
 BASOPHILS 03/04/2021 0   
 IMMATURE GRANULOCYTES 03/04/2021 3  ABS. NEUTROPHILS 03/04/2021 28.1*  
 ABS. LYMPHOCYTES 03/04/2021 0.3*  
 ABS. MONOCYTES 03/04/2021 2.9*  
 ABS. EOSINOPHILS 03/04/2021 0.0  ABS. BASOPHILS 03/04/2021 0.0  ABS. IMM. GRANS. 03/04/2021 1.0*  
 RBC COMMENTS 03/04/2021 Value:RARE 
SCHISTOCYTES  RBC COMMENTS 03/04/2021 Value:SLIGHT 
ANISOCYTOSIS 
  
 WBC COMMENTS 03/04/2021 Result Confirmed By Smear  PLATELET COMMENTS 11/31/7673 ADEQUATE  DF 03/04/2021 AUTOMATED  Sodium 03/04/2021 139  Potassium 03/04/2021 5.0  Chloride 03/04/2021 107   
 CO2 03/04/2021 24   
 Anion gap 03/04/2021 8  Glucose 03/04/2021 237*  BUN 03/04/2021 53*  Creatinine 03/04/2021 0.91   
 GFR est AA 03/04/2021 >60   
 GFR est non-AA 03/04/2021 >60   
 Calcium 03/04/2021 8.6  Prothrombin time 03/04/2021 14.0  INR 03/04/2021 1.1  Troponin-High Sensitivity 03/04/2021 84.1*  Troponin-High Sensitivity 03/04/2021 81.1*  Special Requests: 03/04/2021 PORT  Culture result: 03/04/2021 NO GROWTH AFTER 15 HOURS  Special Requests: 03/04/2021 Value:RIGHT 
FOREARM  Culture result: 03/04/2021 NO GROWTH AFTER 14 HOURS  Lactic acid 03/04/2021 1.7  Procalcitonin 03/04/2021 0.18  Ammonia 03/04/2021 27  Vitamin B12 03/04/2021 >2,000*  TSH 03/04/2021 0.057*  Glucose (POC) 03/04/2021 175*  T4, Free 03/04/2021 1.3   
 WBC 03/05/2021 27.2*  
 RBC 03/05/2021 4.36   
 HGB 03/05/2021 12.9*  
 HCT 03/05/2021 39.0*  
 MCV 03/05/2021 89.4  MCH 03/05/2021 29.6  MCHC 03/05/2021 33.1  RDW 03/05/2021 19.8*  PLATELET 78/12/7017 393  MPV 03/05/2021 10.2  ABSOLUTE NRBC 03/05/2021 0.00   
 LIPID PROFILE 03/05/2021  Cholesterol, total 03/05/2021 161  Triglyceride 03/05/2021 247*  HDL Cholesterol 03/05/2021 74*  LDL, calculated 03/05/2021 37.6  VLDL, calculated 03/05/2021 49.4*  
 CHOL/HDL Ratio 03/05/2021 2.2  Hemoglobin A1c 03/05/2021 6.9*  
 Est. average glucose 03/05/2021 151  Sodium 03/05/2021 141  Potassium 03/05/2021 4.4  Chloride 03/05/2021 109*  
 CO2 03/05/2021 29   
 Anion gap 03/05/2021 3*  Glucose 03/05/2021 154*  
 BUN 03/05/2021 41*  Creatinine 03/05/2021 0.73*  GFR est AA 03/05/2021 >60   
 GFR est non-AA 03/05/2021 >60   
 Calcium 03/05/2021 8.3  Glucose (POC) 03/05/2021 157* IMAGING: 
Xr Chest Pa Lat Result Date: 3/4/2021 1. Interval slightly decreasing right upper lobe paramediastinal mass with resolved right basilar effusion however residual pleural thickening along right apical lateral lung segment. CPT code(s) 77360 Mri Brain W Wo Cont Result Date: 3/4/2021 1) Negative for infarction. 2) Negative for metastatic disease. No enhancing lesions. 3) Nonspecific, nonenhancing FLAIR white matter hyperintensities, likely chronic small vessel disease. Us Abd Comp Result Date: 3/5/2021 1. Tiny nonobstructing right kidney stone. 2. Small 2.4 cm left kidney cyst. 3. Markedly distended urinary bladder. The patient's nurse was notified by the ultrasound technologist.  
 
Ct Code Neuro Head Wo Contrast 
 
Result Date: 3/4/2021 1. Age-related senescent changes and chronic microvascular disease. If patient's symptoms persist, further evaluation with MRI should be considered. CPT code 10007 EKG: 
No results found for this or any previous visit.  
 
 
 
 
Nikko Terrell NP 
3/5/2021 9:40 AM

## 2021-03-05 NOTE — PROGRESS NOTES
LTG: Patient will tolerate least restrictive diet without overt signs or symptoms of airway compromise. STG: Patient will participate in modified barium swallow study as clinically indicated. SPEECH LANGUAGE PATHOLOGY: DYSPHAGIA- Initial Assessment NAME/AGE/GENDER: Rigo Haddad is a 68 y.o. male DATE: 3/5/2021 PRIMARY DIAGNOSIS: Acute ischemic stroke ([facility] [address] [I63.9] ICD-10: Treatment Diagnosis: R13.12 Dysphagia, Oropharyngeal Phase RECOMMENDATIONS  
DIET:  
? NPO  
? Can have 2-3 ice chips per hour with supervision from nursing staff MEDICATIONS: Crushed in puree ASPIRATION PRECAUTIONS 
· Oral care COMPENSATORY STRATEGIES/MODIFICATIONS · None RECOMMENDATIONS for CONTINUED SPEECH THERAPY:  
YES: Anticipate need for ongoing speech therapy during this hospitalization. ASSESSMENT Patient presents with mild-moderate oral and significant pharyngeal dysphagia symptoms. Reduced labial seal resulted in moderate anterior loss on right side with thin liquids by cup. Delayed weak cough following thin by cup and straw with patient not eliciting cough until asked to verbalize concerning for silent aspiration. No overt s/sx airway compromise with puree. Recommend NPO except meds crushed in puree (if crushable). Can have 2-3 ice chips per hour for pleasure after oral care with nursing staff. Modified barium swallow study tenatively planned for today at 1pm. Awaiting response from radiology regarding scheduling. EDUCATION: 
· Recommendations discussed with Patient, Family and Hospitalist 
CONTINUATION OF SKILLED SERVICES/MEDICAL NECESSITY: 
? Patient is expected to demonstrate progress in  swallow strength, swallow timeliness, swallow function, diet tolerance and swallow safety in order to  improve swallow safety, work toward diet advancement and decrease aspiration risk. ? Patient continues to require skilled intervention due to dysphagia. REHABILITATION POTENTIAL FOR STATED GOALS: Good PLAN   
FREQUENCY/DURATION: Continue to follow patient 3 times a week for duration of hospital stay to address above goals. Next treatment session will address Modified Barium Swallow Study - Recommendations for next treatment session: Next treatment session will address Modified Barium Swallow Study SUBJECTIVE Patient alert upright in bedside chair. Per RN has been requesting po intake. Just passed STAND. Oxygen Device: room air Pain: Pain Scale 1: Numeric (0 - 10) Pain Intensity 1: 0 History of Present Injury/Illness: Mr. 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 He also has no past medical history of Arrhythmia, Asthma, Autoimmune disease ([address] CAD (coronary artery disease), Chronic kidney disease, Coagulation defects, COPD, Diabetes ([address] Difficult intubation, GERD (gastroesophageal reflux disease), Heart failure ([address] Liver disease, Malignant hyperthermia due to anesthesia, Nausea & vomiting, Pseudocholinesterase deficiency, Psychiatric disorder, PUD (peptic ulcer disease), Seizures ([address] or Thromboembolus ([address] . He also  has a past surgical history that includes hx other surgical; hx colonoscopy; hx heent; hx hernia repair (2015); hx orthopaedic (Left); and ir insert tunl cvc w port over 5 years (1/7/2021). PRECAUTIONS/ALLERGIES: Patient has no known allergies. Problem List:  (Impairments causing functional limitations): 1. Oropharyngeal dysphagia Previous Dysphagia: NONE REPORTED Diet Prior to Evaluation: Mechanical soft/thin- Passed STAND Orientation: 
Person Place Time Cognitive-Linguistic Screening: ? Alertness 
o Alert ? Speech Production: o Some dysarthria, but able to understand ? Expressive Language: 
o Fluent speech and Word finding deficits noted ? Receptive Language: 
o Answers yes/no questions appropriately and Follows commands ? Cognition:  
o Some confusion noted. Patient asking Edmar Doll can I try the plan again? \" but unable to clarify what he's referring to Prior Level of Function: Lives at home with wife. Independent with most ADLs Recommendations: Given results of screening, anticipate need for cognitive linguistic assessment if confusion persists. OBJECTIVE Oral Motor:  
· Labial: Decreased seal and min right facial droop · Dentition: Upper Dentures and Lower Dentures · Oral Hygiene: Adequate · Lingual: No impairment Dysphagia evaluation: 
 Patient consumed trials of thin by teaspoon/cup/straw, puree, and mixed consistency. Mild-moderate anterior loss on right with thin by cup. Only minimal anterior loss with thin by straw. Patient appears to piecemeal swallow thin by cup and straw. Delayed cough post swallow with thin by cup on 1 out of 5 trials. Initially no overt s/sx with thin by single straw sips. Consumed puree across 4 trials and mixed consistency across 2 trials without overt s/sx airway compromise; however, when returning to thin by straw patient asked to verbalize post swallow and then exhibited weak cough across 2 trials. Symptoms persisted with returning to trials of thin by cup with wet vocal quality also noted. Tool Used: Dysphagia Outcome and Severity Scale (GINA) Score Comments Normal Diet  [] 7 With no strategies or extra time needed Functional Swallow  [] 6 May have mild oral or pharyngeal delay Mild Dysphagia  [] 5 Which may require one diet consistency restricted Mild-Moderate Dysphagia  [] 4 With 1-2 diet consistencies restricted Moderate Dysphagia  [] 3 With 2 or more diet consistencies restricted Moderate-Severe Dysphagia  [x] 2 With partial PO strategies (trials with ST only) Severe Dysphagia  [] 1 With inability to tolerate any PO safely Score:  Initial: 2 Most Recent: x (Date 03/05/21 ) Interpretation of Tool: The Dysphagia Outcome and Severity Scale (GINA) is a simple, easy-to-use, 7-point scale developed to systematically rate the functional severity of dysphagia based on objective assessment and make recommendations for diet level, independence level, and type of nutrition. Current Medications: No current facility-administered medications on file prior to encounter. Current Outpatient Medications on File Prior to Encounter Medication Sig Dispense Refill  dexAMETHasone (DECADRON) 4 mg tablet One tab three times a day 90 Tab 0  
 folic acid (FOLVITE) 1 mg tablet Take 1 Tab by mouth daily. 30 Tab 5  predniSONE (DELTASONE) 10 mg tablet Take 90 mg by mouth daily (with breakfast). 90 Tab 0  prochlorperazine (Compazine) 10 mg tablet Take 1 Tab by mouth every six (6) hours as needed for Nausea or Vomiting for up to 30 days. Indications: nausea and vomiting caused by cancer drugs 90 Tab 3  
 ondansetron (ZOFRAN ODT) 8 mg disintegrating tablet Take 1 Tab by mouth every eight (8) hours as needed for Nausea or Vomiting. Indications: prevent nausea and vomiting from cancer chemotherapy 90 Tab 3  
 lidocaine-prilocaine (EMLA) topical cream Apply a dab to port site 30-45 min prior to labs/infusion appt. Cover with saran wrap 30 g 2  
 HYDROcodone-acetaminophen (NORCO) 5-325 mg per tablet Take 1 Tab by mouth every six (6) hours as needed for Pain for up to 30 days. Max Daily Amount: 4 Tabs. 30 Tab 0  
 furosemide (LASIX) 20 mg tablet Take 1 Tab by mouth daily. Indications: accumulation of fluid resulting from chronic heart failure 90 Tab 1  
 amLODIPine (NORVASC) 5 mg tablet Take 5 mg by mouth daily.  cyanocobalamin (VITAMIN B-12) 1,000 mcg sublingual tablet Take 1,000 mcg by mouth every morning.  POTASSIUM (POTASSIMIN PO) Take  by mouth every morning.  Hydrochlorothiazide (HYDRODIURIL) 12.5 mg tablet Take 12.5 mg by mouth every morning.  Cholecalciferol, Vitamin D3, (VITAMIN D3) 1,000 unit cap Take  by mouth every morning.  Thyroid, Pork, (ARMOUR THYROID) 120 mg Tab Take 150 mg by mouth daily. Takes 1 (120 mg) + 1 (30 mg) tab daily INTERDISCIPLINARY COLLABORATION: RN and MD 
 
After treatment position/precautions: · Upright in chair · RN notified Total Treatment Duration:  
Time In: 6041 Time Out: 5377 Andreina Mireles MS, CCC-SLP

## 2021-03-05 NOTE — PROGRESS NOTES
LTG: Patient will tolerate least restrictive diet without overt signs or symptoms of airway compromise. STG: Patient will participate in modified barium swallow study as clinically indicated. MET 3/5/21 STG: Patient will tolerate chopped mechanical soft diet and thin liquids by cup without overt s/sx airway compromise. Added 3/5/21. SPEECH LANGUAGE PATHOLOGY: MODIFIED BARIUM SWALLOW STUDY Initial Assessment NAME/AGE/GENDER: Rigo Haddad is a 68 y.o. male DATE: 3/5/2021 PRIMARY DIAGNOSIS: Acute ischemic stroke ([facility] [I63.9] ICD-10: Treatment Diagnosis: Oropharyngeal dysphagia (R13.12) INTERDISCIPLINARY COLLABORATION: Radiologist, Dr. Johnson Gaines PRECAUTIONS/ALLERGIES: Patient has no known allergies. RECOMMENDATIONS/PLAN  
DIET:  
? PO diet texture:  Mechanical soft with chopped meat and vegetables ? Liquids:  regular thin, by cup only, using single sips MEDICATIONS: Whole in puree COMPENSATORY STRATEGIES/MODIFICATIONS INCLUDING: 
· Fully awake/alert · Upright for all PO 
· 1:1 assistance with all PO 
· Small bites and sips · Cup/sip · No straws OTHER RECOMMENDATIONS (including follow up treatment recommendations): · Treatment to improve/facilitate oral/pharyngeal skills · Training in use of compensatory safe swallowing strategies/feeding guidelines · Patient/family education RECOMMENDATIONS for CONTINUED SPEECH THERAPY:  
YES: Anticipate need for ongoing speech therapy during this hospitalization. FREQUENCY/DURATION: Continue to follow patient 3 times a week for duration of hospital stay to address above goals. Recommendations for next treatment session: Next treatment session will address diet tolerance ASSESSMENT Mr. Steph Alcantara presents with moderate oral and mild pharyngeal dysphagia exacerbated by altered mentation. Adequate oral containment with all trials as evidenced by no anterior bolus loss, which was observed during bedside swallow evaluation earlier today. Mild oral holding with patient \"swishing\" liquids in oral cavity prior to swallow. Piecemeal swallows with all consistencies resulting in trace non-clearing penetration with thin by straws and consecutive sips of thin by cup. No aspiration/penetration occurred with thin by single cup sips, pudding, mixed, or solid consistency trials. Min-mild residue on base of tongue post swallow with all consistencies due to reduced tongue base retraction. Recommend initiate mechanical soft diet/chopped meats &vegetables and thin liquids by small single cups sips. No straws. Meds floated in puree or crushed in puree (if cleared by pharmacy). Needs 1:1 assistance with all po intake to ensure use of safe swallowing precaution. Will continue to follow for diet tolerance and patient/caregiver education. SUBJECTIVE Patient alert upright in bed for assessment. Pleasantly confused. History of Present Injury/Illness: Mr. 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) (12/28/2020), and Thyroid disease. He also has no past medical history of Arrhythmia, Asthma, Autoimmune disease ([address] CAD (coronary artery disease), Chronic kidney disease, Coagulation defects, COPD, Diabetes ([address] Difficult intubation, GERD (gastroesophageal reflux disease), Heart failure ([address] Liver disease, Malignant hyperthermia due to anesthesia, Nausea & vomiting, Pseudocholinesterase deficiency, Psychiatric disorder, PUD (peptic ulcer disease), Seizures ([address] or Thromboembolus ([facility] [address] . He also  has a past surgical history that includes hx other surgical; hx colonoscopy; hx heent; hx hernia repair (2015); hx orthopaedic (Left); and ir insert tunl cvc w port over 5 years (1/7/2021). Pain: 
Pain Intensity 1: 0 Current dietary status prior to evaluation today:  NPO Previous Modified Barium Swallow studies: N/A Current Medications: No current facility-administered medications on file prior to encounter. Current Outpatient Medications on File Prior to Encounter Medication Sig Dispense Refill  dexAMETHasone (DECADRON) 4 mg tablet One tab three times a day 90 Tab 0  
 folic acid (FOLVITE) 1 mg tablet Take 1 Tab by mouth daily. 30 Tab 5  predniSONE (DELTASONE) 10 mg tablet Take 90 mg by mouth daily (with breakfast). 90 Tab 0  prochlorperazine (Compazine) 10 mg tablet Take 1 Tab by mouth every six (6) hours as needed for Nausea or Vomiting for up to 30 days.  Indications: nausea and vomiting caused by cancer drugs 90 Tab 3  
  ondansetron (ZOFRAN ODT) 8 mg disintegrating tablet Take 1 Tab by mouth every eight (8) hours as needed for Nausea or Vomiting. Indications: prevent nausea and vomiting from cancer chemotherapy 90 Tab 3  
 lidocaine-prilocaine (EMLA) topical cream Apply a dab to port site 30-45 min prior to labs/infusion appt. Cover with saran wrap 30 g 2  
 HYDROcodone-acetaminophen (NORCO) 5-325 mg per tablet Take 1 Tab by mouth every six (6) hours as needed for Pain for up to 30 days. Max Daily Amount: 4 Tabs. 30 Tab 0  
 furosemide (LASIX) 20 mg tablet Take 1 Tab by mouth daily. Indications: accumulation of fluid resulting from chronic heart failure 90 Tab 1  
 amLODIPine (NORVASC) 5 mg tablet Take 5 mg by mouth daily.  cyanocobalamin (VITAMIN B-12) 1,000 mcg sublingual tablet Take 1,000 mcg by mouth every morning.  POTASSIUM (POTASSIMIN PO) Take  by mouth every morning.  Hydrochlorothiazide (HYDRODIURIL) 12.5 mg tablet Take 12.5 mg by mouth every morning.  Cholecalciferol, Vitamin D3, (VITAMIN D3) 1,000 unit cap Take  by mouth every morning.  Thyroid, Pork, (ARMOUR THYROID) 120 mg Tab Take 150 mg by mouth daily. Takes 1 (120 mg) + 1 (30 mg) tab daily OBJECTIVE Orientation:  
? Person ? Place Oral Assessment:  Labial: Right droop Dentition: Upper Dentures and Lower Dentures Oral Hygiene: Adequate Lingual: No impairment Vocal Quality: Low volume Modified barium swallow study was performed in the radiology suite with Mr. Yuli Blas in the lateral plane upright 90° in bed and using C-arm. To evaluate his swallow function, barium coated liquid and food was administered in the form of thin liquids (by spoon, cup sip and straw sip), pudding, mixed consistency and cracker. Oral phase of swallow:  
? anterior bolus holding ? prolonged mastication 
? delayed oral transit ? piecemeal deglutition Pharyngeal phase of swallow: ? Swallows of thin by teaspoon and cup triggered at the vallecula. Oral holding and swishing thin liquids for 2-3 seconds before patient propelled bolus. Patient would piecemeal swallow bolus leaving approximately 1/4 of bolus in anterior oral cavity after initial swallow then oral residue spilled to vallecula and pyriforms before prompting 2nd swallow. No aspiration occurred with thin by teaspoon or thin by single cups sips. Swallow triggered at posterior epiglottis with thin by straw resulting in trace non-clearing penetration during the swallow with immediate weak cough response. Given immediate 2nd sip of thin by cup prior to patient swallowing vallecular/pyriforms residue from prior piecemeal swallow, patient with trace penetration of pyriform residue during the swallow that did not completely clear. ? Swallow triggered at vallecula with pudding/mixed/solid trials. Continues to piecemeal swallow bolus. Significantly prolonged mastication of solid cracker suspected to be worsened by patient attempting to watch swallow study on screen. ? No significant pharyngeal residue post swallow with any consistency. Only min-mild residue at base of tongue due to reduced lingual retraction. Pharyngeal characteristics: 
? reduced retraction of base of tongue 
? delayed hyolaryngeal elevation/excursion 
? delayed epiglottic inversion 
? adequate constriction of posterior pharyngeal wall 
? delayed laryngeal closure Attempted strategies:  
? none Effective strategies:  
? none Aspiration/Penetration Scale: 3 (Penetration/visible residue. Contrast remains above the folds/cords, but is not cleared.) Cervical esophageal phase of swallow:  
? a limited view. Therefore, unable to rule out an esophageal component of dysphagia **Distal esophagus not assessed due to limitations of MBS study. Assessment/Reassessment only, no treatment provided today. Tool Used: Dysphagia Outcome and Severity Scale (GINA) Comments Normal Diet With no strategies or extra time needed Functional Swallow May have mild oral or pharyngeal delay Mild Dysphagia Which may require one diet consistency restricted Mild-Moderate Dysphagia With 1-2 diet consistencies restricted Moderate Dysphagia With 2 or more diet consistencies restricted Moderately Severe Dysphagia With partial PO strategies (trials with ST only) Severe Dysphagia With inability to tolerate any PO safely Score:  Initial: 4 Most Recent: x (Date:3/5/2021) Interpretation of Tool: The Dysphagia Outcome and Severity Scale (GINA) is a simple, easy-to-use, 7-point scale developed to systematically rate the functional severity of dysphagia based on objective assessment and make recommendations for diet level, independence level, and type of nutrition. [place] / [place] / Product Type: Medicare /  
 
Education: · Recommendations discussed with patient, patient's wife and son, and RN, Chante Muniz. Safety: After treatment position/precautions: 
· Patient waiting in radiology holding bay for transport back to room. Total Treatment Duration: 
Time In: 1330 Time Out: 1400 Gabi Hamilton MS, CCC-SLP

## 2021-03-05 NOTE — PROGRESS NOTES
ACUTE OT GOALS: 
(Developed with and agreed upon by patient and/or caregiver.) 1. Patient will complete lower body bathing and dressing with minimal assistance and adaptive equipment as needed. 2. Patient will complete toileting with minimal assistance. 3. Patient will tolerate 25 minutes of OT treatment with 1-2 rest breaks to increase activity tolerance for ADLs. 4. Patient will complete functional transfers with stand by assistance and adaptive equipment as needed. 5. Patient will tolerate 10 minutes BUE exercises to increase strength for safe, functional transfers. Timeframe: 7 visits OCCUPATIONAL THERAPY ASSESSMENT: Initial Assessment and Daily Note OT Treatment Day # 1 Edi Devlin is a 68 y.o. male PRIMARY DIAGNOSIS: Acute ischemic stroke ([facility] [address] Acute ischemic stroke ([facility] [address] [I63.9] Reason for Referral: ICD-10: Treatment Diagnosis: Generalized Muscle Weakness (M62.81) Other lack of cordination (R27.8) Difficulty in walking, Not elsewhere classified (R26.2) Repeated Falls (R29.6) Facial weakness (R29.810) [place]PAT[place] / [place] / Product Type: Medicare /  
ASSESSMENT:  
 
REHAB RECOMMENDATIONS:  
Recommendation to date pending progress: 
Setting: 
? Short-term Rehab Equipment: ? To Be Determined PRIOR LEVEL OF FUNCTION: 
(Prior to Hospitalization) Pt is a poor historian, per chart pt gets help from wife for ADLs, but stated he was independent this eval INITIAL/CURRENT LEVEL OF FUNCTION: 
(Based on today's evaluation) Bathing: ? Minimal Assistance Dressing: ? Minimal Assistance Feeding/Grooming: ? Independent Toileting: ? Minimal Assistance Functional Mobility: ? Minimal Assistance Bathing: ? Moderate Assistance Dressing: ? Maximal Assistance Feeding/Grooming: 
? Set Up Toileting: ? Maximal Assistance Functional Mobility: ? Minimal Assistance x 2  
 
ASSESSMENT: 
 Mr. Gaby Bales is a 69 y/o male presents with R sided weakness after CVA workup, altered mental status, and metastatic lung cancer. Pt lives with wife and per chart, is min A for ADLs at baseline. Pt is a questionable historian secondary to AMS stating he uses a w/c at baseline and stands up to bathe. Pt reported using a RW for ambulation household distances. Pt with decreased functional ADLs, mobility and strength. Pt completed bed mobility mod A x2 and sit<>stand to ambulate household distances min A x2 with shuffling gait and mod verbal cues for stepping pattern. Pt max A toileting hygiene and brief change in standing. Pt is currently functioning below baseline and would benefit from skilled OT services to address deficits and goals. Rec STR at d/c.  
 
SUBJECTIVE:  
Mr. Gaby Bales states, \"I'm tired. \" SOCIAL HISTORY/LIVING ENVIRONMENT: Pt lives with wife and per chart, is min A for ADLs at baseline. Pt is a questionable historian secondary to AMS stating he uses a w/c at baseline and stands up to bathe. Pt reported using a RW for ambulation household distances. Home Environment: Private residence Living Alone: No 
Support Systems: Spouse/Significant Other/Partner OBJECTIVE:  
 
PAIN: VITAL SIGNS: LINES/DRAINS:  
Pre Treatment: Pain Screen Pain Scale 1: Numeric (0 - 10) Pain Intensity 1: 0 Post Treatment: 0 Vital Signs BP: (!) 149/89(after activity) MAP (Calculated): 109 BP 1 Location: Right upper arm BP 1 Method: Automatic 
BP Patient Position: Sitting(after activity) O2 Sat (%): 98 % O2 Device: Room air IV 
O2 Device: Room air GROSS EVALUATION: 
BUE Within Functional Limits Abnormal/ Functional Abnormal/ Non-Functional (see comments) Not Tested Comments: AROM [x] [] [] [] PROM [] [] [] [] Strength [x] [] [] [] Balance [] [x] [] [] Posture [] [x] [] [] Sensation [] [] [] [] Coordination [] [] [x] [] Frequent verbal cues for stepping pattern with RW, shuffling gait Tone [] [] [] [] Edema [] [] [] [] Activity Tolerance [] [x] [] []   
 [] [] [] [] COGNITION/ 
PERCEPTION: Intact Impaired  
(see comments) Comments:  
Orientation [] [x] Vision [] [x] Running into things in room, unaware of surroundings Hearing [] [x] Judgment/ Insight [] [x] Poor insight into deficits Attention [] [x] Memory [] [x] Questionable historian secondary to AMS Command Following [] [x] Frequent verbal cues Emotional Regulation [] []   
 [] [] ACTIVITIES OF DAILY LIVING: I Mod I S SBA CGA Min Mod Max Total NT Comments BASIC ADLs:             
Bathing/ Showering [] [] [] [] [] [] [] [] [] [] Toileting [] [] [] [] [] [] [] [x] [] [] Hygiene in standing Dressing [] [] [] [] [] [] [] [] [x] [] Donning socks in bed Feeding [] [] [] [] [] [] [] [] [] []   
Grooming [] [] [x] [] [] [] [] [] [] [] Set up combing hair EOB Personal Device Care [] [] [] [] [] [] [] [] [] [] Functional Mobility [] [] [] [] [] [x] [] [] [] [] x2 RW  
I=Independent, Mod I=Modified Independent, S=Supervision, SBA=Standby Assistance, CGA=Contact Guard Assistance,  
Min=Minimal Assistance, Mod=Moderate Assistance, Max=Maximal Assistance, Total=Total Assistance, NT=Not Tested MOBILITY: I Mod I S SBA CGA Min Mod Max Total  NT x2 Comments:  
Supine to sit [] [] [] [] [] [] [x] [] [] [] [x] Sit to supine [] [] [] [] [] [] [] [] [] [] [] Sit to stand [] [] [] [] [] [x] [] [] [] [] [x] RW Bed to chair [] [] [] [] [] [x] [] [] [] [] [x] RW  
I=Independent, Mod I=Modified Independent, S=Supervision, SBA=Standby Assistance, CGA=Contact Guard Assistance,  
Min=Minimal Assistance, Mod=Moderate Assistance, Max=Maximal Assistance, Total=Total Assistance, NT=Not [facility]ed [facility] Daily [facility] Inpatient Short Form How much help from another person does the patient currently need. .. Total A Lot A Little None 1.  Putting on and taking off regular lower body clothing? [] 1   [x] 2   [] 3   [] 4  
2. Bathing (including washing, rinsing, drying)? [] 1   [x] 2   [] 3   [] 4  
3. Toileting, which includes using toilet, bedpan or urinal?   [] 1   [x] 2   [] 3   [] 4  
4. Putting on and taking off regular upper body clothing? [] 1   [] 2   [x] 3   [] 4  
5. Taking care of personal grooming such as brushing teeth? [] 1   [] 2   [x] 3   [] 4  
6. Eating meals? [] 1   [] 2   [] 3   [x] 4  
© 2007, Trustees of [address] [place] [address] [address] [address], under license to [employer]. All rights reserved Score:  Initial: 16 Most Recent: X (Date: -- ) Interpretation of Tool:  Represents activities that are increasingly more difficult (i.e. Bed mobility, Transfers, Gait). PLAN:  
FREQUENCY/DURATION: OT Plan of Care: 3 times/week for duration of hospital stay or until stated goals are met, whichever comes first. 
 
PROBLEM LIST:  
(Skilled intervention is medically necessary to address:) 1. Decreased ADL/Functional Activities 2. Decreased Activity Tolerance 3. Decreased AROM/PROM 4. Decreased Balance 5. Decreased Cognition 6. Decreased Coordination 7. Decreased Gait Ability 8. Decreased Strength 9. Decreased Transfer Abilities INTERVENTIONS PLANNED:  
(Benefits and precautions of occupational therapy have been discussed with the patient.) 1. Self Care Training 2. Therapeutic Activity 3. Therapeutic Exercise/HEP 4. Neuromuscular Re-education 5. Gait Training 6. Education TREATMENT:  
 
EVALUATION: Low Complexity : (Untimed Charge) TREATMENT:  
($$ Self Care/Home Management: 8-22 mins$$ Neuromuscular Re-Education: 8-22 mins   ) Co-Treatment PT/OT necessary due to patient's decreased overall endurance/tolerance levels, as well as need for high level skilled assistance to complete functional transfers/mobility and functional tasks Self Care (11 Minutes): Self care including Toileting, Lower Body Dressing, Grooming and ambulating household distances to increase independence and decrease level of assistance required. Neuromuscular Re-education (11 Minutes): Neuromuscular Re-education included Balance Training, Coordination training, Functional mobility with facilitation, Postural training, Sitting balance training and Standing balance training to improve Balance, Coordination, Functional Mobility and Postural Control. AFTER TREATMENT POSITION/PRECAUTIONS: 
Alarm Activated, Chair, Needs within reach and RN notified INTERDISCIPLINARY COLLABORATION: 
RN/PCT, PT/PTA and OT/ROONEY TOTAL TREATMENT DURATION: 
OT Patient Time In/Time Out Time In: 3243 Time Out: 1002 Vida Tamez OT

## 2021-03-05 NOTE — PROGRESS NOTES
03/04/21 1913 NIH Stroke Scale Interval Other (comment) (dual with Para YULISA Plata)  
LOC 1 LOC Questions 0 LOC Commands 0 Best Gaze 0 Visual 0 Facial Palsy 1 Motor Right Arm 0 Motor Left Arm 0 Motor Right Leg 1 Motor Left Leg 1 Limb Ataxia 0 Sensory 0 Best Language 0 Dysarthria 0 Extinction and Inattention 0 Total 4

## 2021-03-05 NOTE — PROGRESS NOTES
Pt is a 67 yo male admitted due to a CVA. SW met with pt/spouse/son at the bedside to discuss dc needs. Per hospital protocol, CM wore appropriate PPE and observed social distancing. No direct physical contact. Demographics, insurance and PCP confirmed. Pt lives with the spouse in their own home. The spouse's son lives in [place] and assists when he can. They are definitely interested in the pt going to STR at [place]. SW provided them with a list of facilities to review and they agreed to provide SW with choices asap. They verbalized understanding that pt would not be able to have chemo or radiation while in rehab. PPD ordered 3/4/21. COVID test ordered today. SW following to facilitate pt's transfer to rehab at [place]. Care Management Interventions PCP Verified by CM: Yes Mode of Transport at Discharge: S Transition of Care Consult (CM Consult): Discharge Planning Discharge Durable Medical Equipment: No 
Physical Therapy Consult: Yes Occupational Therapy Consult: Yes Speech Therapy Consult: Yes Current Support Network: Own Home, Lives with Spouse Confirm Follow Up Transport: Family The Plan for Transition of Care is Related to the Following Treatment Goals : STR to improve pt's strength and functional abilities for a safe transition to home. The Patient and/or Patient Representative was Provided with a Choice of Provider and Agrees with the Discharge Plan?: Yes Name of the Patient Representative Who was Provided with a Choice of Provider and Agrees with the Discharge Plan: Delmi Estrada/spouse [place] of Choice List was Provided with Basic Dialogue that Supports the Patient's Individualized Plan of Care/Goals, Treatment Preferences and Shares the Quality Data Associated with the Providers?: Yes Discharge Location Discharge Placement: Rehab Unit Subacute

## 2021-03-05 NOTE — PROGRESS NOTES
03/05/21 4494 NIH Stroke Scale Interval Other (comment) 
(Dual NIH with [place], RN)  
LOC 0  
LOC Questions 0 LOC Commands 0 Best Gaze 0 Visual 0 Facial Palsy 1 Motor Right Arm 0 Motor Left Arm 0 Motor Right Leg 1 Motor Left Leg 1 Limb Ataxia 0 Sensory 0 Best Language 0 Dysarthria 0 Extinction and Inattention 0 Total 3

## 2021-03-05 NOTE — MANAGEMENT PLAN
Mr Bandar Ibrahim is a 78M with a PMH of hypercholesterolemia, HTN, obesity, thyroid disease. He is a former tobacco use and quit in 1986 after smoking 1.5 ppd x 30 years. 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 He received C2 carbo/alimta 2/4/2021 [facility] held with C2 due to rash). He underwent PET after 2C with what Dr Germain Dong felt to be responding disease. 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 He was started on Dex and saw Rad Onc. MRI showed extension with displacement of cord but no definite compression. He was referred to RadOn and had CT sim complete and what appears to be 2 fx of palliative radiation with plans to resume C3 there after. He was seen in the clinic on 3/4 as a work-in for urinary incontinence, intermittent confusion, aphasia, left facial droop and weakness and was sent to ED for further evaluation and stroke work-up. MRI brain without acute infarct, EEG without seizure activity. He is being treated for encephalopathy. Of note, he has mild leukocytosis - today WBC 27.2. UA and cultures negative. Oncology consulted for further recommendations. Formal consult and recommendations by Dr Martinez Anchors to follow. RAJENDRA Cedillo [facility] [address] [address] Office : [phone] Fax : [phone]

## 2021-03-05 NOTE — CONSULTS
[facility] Hematology & Oncology: In Patient Hematology / Oncology Consult Note Reason for Consult:  Lung cancer, AMS, urine retention Referring Physician:  Renea Morillo DO History of Present Illness: 
68 y.o. M PMH HTN, HLD, thyroid disease, former 39 pkyr smoker, recently diagnosed of stage IV NSCLC in 12/2020, received 2 cycles of carbo/alimta/keytruda, stopped for rash, PET showed response but progression in the right paraspinal lesion at T5-6 with extension into spinal canal, stared on Dex and had MRI confirmed the extension with displacement of cord but no definitive compression, received XRT x2, saw in clinic on 3/4 and sent to ED for admission for urinary incontinence, confusion, aphasia, left facial droop and weakness, stroke workup negative, MRI brain no infarct, EEG no seizure, bladder scan very full , UA/UC negative, consulted oncology for lung cancer and leukocytosis. Review of Systems: 
Constitutional Weak. Denies fever or chills. HEENT Denies trauma, bluring vision, hearing loss, ear pain, nosebleeds, sore throat, neck pain and ear discharge. Skin Denies lesions or rashes. Lungs Denies shortness of breath, cough, sputum production or hemoptysis. Cardiovascular Denies chest pain, palpitations, orthopnea, claudication and leg swelling. Gastrointestinal Denies nausea, vomiting, bowel changes. Denies bloody or black stools. Denies abdominal pain.  Incontinence resolved. Denies dysuria, frequency or hesitancy of urination Neuro LE weakness. Confusion resolved. Denies headaches, visual changes or ataxia. Denies dizziness, tingling, tremors, sensory change, speech change, focal weakness and headaches. Hematology Denies nasal/gum bleeding, denies easy bruise Endo Denies heat/cold intolerance, denies diabetes. MSK Denies back pain, swollen legs, myalgias and falls. Psychiatric/Behavioral Denies depression and substance abuse. The patient is not nervous/anxious. No Known Allergies Past Medical History:  
Diagnosis Date  Acute ischemic stroke ([facility] [address] 3/4/2021  Hypercholesterolemia  Hypertension   
 controlled with med  Morbid obesity ([facility] [address] 11/11/15 BMI- 36.1  Other ill-defined conditions(799.89)   
 high cholesterol  Primary malignant neoplasm of lung metastatic to other site [facility]) 2020  Thyroid disease   
 partial thyroid Past Surgical History:  
Procedure Laterality Date  HX COLONOSCOPY    
 HX HEENT    
 part of thyroid removed  HX HERNIA REPAIR  1306  
 umbilical  
 HX ORTHOPAEDIC Left   
 bunion removal on left great toe.  HX OTHER SURGICAL    
 thyroid removed (1)  IR INSERT TUNL CVC W PORT OVER 5 YEARS  2021 Family History Problem Relation Age of Onset  Heart Disease Brother 48 MI  
 Alzheimer Mother  Heart Disease Brother  Pacemaker Brother  Diabetes Sister  Alzheimer Sister  Alcohol abuse Brother  Alzheimer Brother  Diabetes Brother  Alcohol abuse Brother  Alzheimer Brother Social History Socioeconomic History  Marital status:  Spouse name: Not on file  Number of children: Not on file  Years of education: Not on file  Highest education level: Not on file Occupational History  Not on file Social Needs  Financial resource strain: Not on file  Food insecurity Worry: Not on file Inability: Not on file  Transportation needs Medical: Not on file Non-medical: Not on file Tobacco Use  Smoking status: Former Smoker Packs/day: 1.50 Years: 30.00 Pack years: 45.00 Quit date: 1986 Years since quittin.1  Smokeless tobacco: Former User  Tobacco comment: quit  Substance and Sexual Activity  Alcohol use: Yes Alcohol/week: 1.0 standard drinks Types: 1 Glasses of wine per week Comment: daily  Drug use: No  
 Sexual activity: Not on file Lifestyle  Physical activity Days per week: Not on file Minutes per session: Not on file  Stress: Not on file Relationships  Social connections Talks on phone: Not on file Gets together: Not on file Attends Anabaptism service: Not on file Active member of club or organization: Not on file Attends meetings of clubs or organizations: Not on file Relationship status: Not on file  Intimate partner violence Fear of current or ex partner: Not on file Emotionally abused: Not on file Physically abused: Not on file Forced sexual activity: Not on file Other Topics Concern  Not on file Social History Narrative  Not on file Current Facility-Administered Medications Medication Dose Route Frequency Provider Last Rate Last Admin  sodium chloride (NS) flush 5-40 mL  5-40 mL IntraVENous Q8H Scarlet Ruby, NP   5 mL at 03/05/21 1636  sodium chloride (NS) flush 5-40 mL  5-40 mL IntraVENous PRN Scarlet Ruby, NP      
 LORazepam (ATIVAN) tablet 0.5 mg  0.5 mg Oral Q1H PRN Scarlet Ruby, NP      
 folic acid (FOLVITE) tablet 1 mg  1 mg Oral DAILY Scarlet Ruby, NP   1 mg at 03/05/21 1039  thiamine HCL (B-1) tablet 100 mg  100 mg Oral DAILY Scarlet Ruby, NP   100 mg at 03/05/21 1039  
 multivitamin, tx-iron-ca-min (THERA-M w/ IRON) tablet 1 Tab  1 Tab Oral DAILY Scarlet Ruby, NP   1 Tab at 03/05/21 1039  barium sulfate (VARIBAR NECTAR) 40 % (w/v) contrast suspension 30 mL  30 mL Oral RAD ONCE Ciesco, Deangelo, DO      
 barium sulfate (VARIBAR THIN HONEY) 40 %(w/v), 29% (w/w)(1500 CPS) contrast suspension 30 mL  30 mL Oral RAD ONCE Ciesco, Deangelo, DO      
 tuberculin injection 5 Units  5 Units IntraDERMal Fayne Collet, MD   5 Units at 03/04/21 3088  
 ondansetron (ZOFRAN) injection 4 mg  4 mg IntraVENous Q4H PRN Cornel Hogan MD      
 aspirin chewable tablet 81 mg  81 mg Oral DAILY Cornel Hogan MD   81 mg at 03/05/21 2903  atorvastatin (LIPITOR) tablet 40 mg  40 mg Oral QHS Renetta Tafoya MD   Stopped at 21 2200  acetaminophen (TYLENOL) tablet 650 mg  650 mg Oral Q4H PRN Renetta Tafoya MD      
 labetaloL (NORMODYNE;TRANDATE) injection 5 mg  5 mg IntraVENous Q10MIN PRN Renetta Tafoya MD      
 polyethylene glycol (MIRALAX) packet 17 g  17 g Oral DAILY PRN Renetta Tafoya MD      
 enoxaparin (LOVENOX) injection 40 mg  40 mg SubCUTAneous Q24H Renetta Tafoya MD   40 mg at 21 1634  
 insulin lispro (HUMALOG) injection 0-10 Units  0-10 Units SubCUTAneous Royal Edda MD   4 Units at 21 3085 OBJECTIVE: 
Patient Vitals for the past 8 hrs: 
 BP Pulse SpO2  
21 1600  99   
21 1003   97 % 21 0931 (!) 149/89  98 % Temp (24hrs), Av °F (36.7 °C), Min:97.4 °F (36.3 °C), Max:99.6 °F (37.6 °C) 
 
 0701 -  1900 In: -  
Out: 75 [Urine:75] Physical Exam: 
Constitutional: Oriented to person, place, and time. Well-developed and well-nourished. HEENT: Normocephalic and atraumatic. Oropharynx is clear and moist.  
Conjunctivae and EOM are normal. Pupils are equal, round, and reactive to light. No scleral icterus. Neck supple. No JVD present. No tracheal deviation present. No thyromegaly present. Lymph node No palpable submandibular, cervical, supraclavicular, axillary and inguinal lymph nodes. Skin Warm and dry. No bruising and no rash noted. No erythema. No pallor. Respiratory Effort normal and breath sounds normal.  No respiratory distress. No wheezes. No rales. No tenderness. CVS Normal rate, regular rhythm and normal heart sounds. Exam reveals no gallop, no friction and no rub. No murmur heard. Abdomen Soft. Bowel sounds are normal. Exhibits no distension. There is no tenderness. There is no rebound and no guarding. Neuro Normal reflexes. No cranial nerve deficit. Exhibits normal muscle tone, 3 of 5 strength of lower extremities. MSK Legs swelling. Normal range of motion. No tenderness. Psych Normal mood, affect, behavior, judgment and thought content Labs:   
Recent Labs 03/05/21 0425 03/04/21 
1140 WBC 27.2* 32.3*  
RBC 4.36 4.97  
HGB 12.9* 14.6 HCT 39.0* 43.5 MCV 89.4 87.5 MCH 29.6 29.4 MCHC 33.1 33.6 RDW 19.8* 19.8*  250 GRANS  --  87* LYMPH  --  1* MONOS  --  9  
EOS  --  0* BASOS  --  0 IG  --  3  
DF  --  AUTOMATED ANEU  --  28.1* ABL  --  0.3* ABM  --  2.9* SHAMIR  --  0.0 ABB  --  0.0 AIG  --  1.0* Recent Labs 03/05/21 0425 03/04/21 
1140  139  
K 4.4 5.0  
* 107 CO2 29 24 AGAP 3* 8 * 237* BUN 41* 53* CREA 0.73* 0.91 GFRAA >60 >60 GFRNA >60 >60  
CA 8.3 8.6 ASSESSMENT/RECOMMENDATION: 
 
Principal Problem: 
  Acute ischemic stroke ([facility] [address] (3/4/2021) 68 y.o. M stage IV lung adenocarcinoma s/p 2 cycle triple therapy, T5 XRT for spinal metastasis, admitted for urinary incontinence, confusion, aphasia, left facial droop and weakness, stroke workup negative, bladder scan very full then started urinating and resolved, likely due to overflow incontinence, confusion resolved with supportive care, leukocytosis due to steroid, need rehab and follow in office. Please call as needed. Thank you for allowing me to participate in the care of Mr. Orlando Garzon. Satnam Hayward M.D. [address] Office : [phone] Fax : [phone]

## 2021-03-05 NOTE — CONSULTS
Dea Cox MD 
Medical Director Zachary Rao [address], [address] [facility] Tel: [phone] Physical Medicine & Rehab Consult Admit Date: 3/4/2021 Referring Provider: Hospitalist service / Holley Raphael MD 
 
Medical Decision Making / Plan / Recommend: Medical chart reviewed. Humberto Esparza is a 68 y.o. WM whom we have been requested to evaluate for consideration for inpatient rehabilitation as part of stroke protocol. EMR reviewed in conjunction with Juan Francisco  Director, Dr. Frankey Dibble. Briefly, patient with stage IV lung adenocarcinoma was sent to the ED 3/4/2021 from Oncology office with AMS and right facial weakness; Code S initiated, patient was admitted for work-up. MRI brain without acute infarct, hemorrhage, metastatic lesion, or contrast enhancement. EEG with generalized diffuse slowing, no seizure. Physical and occupational therapy evaluations noted. Patient does not meet medical criteria for [facility] admission. Thank you for inviting us to participate in this patient's care. Please notify us of any changes; otherwise, we will sign off. Juan Luis Ron PA-C Physician Assistant with [facility] 
Amy R. Frankey Dibble, MD, Medical Director Time spent reviewing EMR and medical decision-making was 15 minutes.

## 2021-03-06 NOTE — PROGRESS NOTES
[employer] Hospitalist Service Progress Note INTERVAL HISTORY  / Subjective: 
27-year-old male history of metastatic lung cancer presents with acute right facial droop and waxing and waning confusion, admitted for encephalopathy, rule out stroke and leukocytosis of unclear origin 3/6 Lethargic, alert and oriented to name, place. **Discharge plan: 
SNF, choices given to CM by spouse today. **Sangita [id number]-[id number] via phone. All questions answered. Assessment / Plan: 
Encephalopathy with very mild right facial droop 
-Check TSH, ammonia level, EEG, MRI brain with and without contrast no  Acute findings, . B12 noted 2000 
- PT OT speech therapy eval. echo with bubble study. Defer neurovascular imaging unless neurology feels this is warranted. 3/6  EEG showed generalized diffuse slowing, negative for seizure. MRI brain negative for acute infarct, hemorrhage, metastatic lesion, or limbic encephalopathy. TTE negative for PFO or atrial enlargement Neurology has signed off, no additional neuro work up needed. 
  
Leukocytosis 
-Unclear if occult infection or steroid related 
- lactic acid normal, blood cultures NGTD, urine cultures, procalcitonin negative 
-Abdomen distended, c abdominal ultrasound 
-Empiric antibiotics until cultures negativeVanc,  Zosyn 
-Steroids can blunt febrile response to infection 3/5 holding ABT for now, UA pending, US abdomen showed distended bladder but per nursing, large void after getting back to unit. Likely related to Decadron 3/6 noted 26.7 today, likely steroid related. UA negative, trend, no ABT 
  
Stage IV lung adenocarcinoma- 
oncology on board 
  
EtOH abuse 
 (admits to daily drinking), PS 2 (lives independently with wife, able to do most activities with some help) Thiamine, Folic acid, Multivitamin Ativan prn 
3/5 calm, cooperative 3/6 no signs of withdrawal 
 
 hypothyroidism Home medications Hypertension Home medications Dyslipidemia High dose statin 
 
obese habitus 
-Continue PTA meds once passes speech evaluation-appears unsafe to swallow currently 
-Rest of plan per hospital course Chief Complaint : Facial Droop Objective: 
Visit Vitals BP (!) 153/86 (BP 1 Location: Right upper arm, BP Patient Position: At rest) Pulse (!) 119 Temp 97.8 °F (36.6 °C) Resp 15 Ht 5' 7\" (1.702 m) Wt 93.4 kg (205 lb 14.6 oz) SpO2 97% BMI 32.25 kg/m² Physical Exam: 
General:          Blank stare, inattentive, AO x 1 (not to place or time). Requires frequent redirection Eyes:               Normal sclerae. Extraocular movements intact. HENT:             Normocephalic, atraumatic. Moist mucous membranes CV:                  RRR. No m/r/g. Lungs:             CTAB. No wheezing, rhonchi, or rales. Abdomen:        Soft, nontender, positive distention. Extremities:     Warm and dry. No cyanosis or edema. Intention tremor Neurologic:      CN II-XII grossly intact. Sensation intact. Skin:                No rashes or jaundice. Normal coloration Psych:             Normal mood and affect. Intake and Output: 
Date 03/05/21 0700 - 03/06/21 5699 03/06/21 0700 - 03/07/21 2664 Shift [id number] 3062-1547 24 Hour Total 8530-6790 6651-8636 24 Hour Total  
INTAKE Shift Total(mL/kg) OUTPUT Urine(mL/kg/hr) 75(0.1)  75(0) Urine Voided 75  75 Urine Occurrence(s) 2 x 2 x 4 x Stool Stool Occurrence(s) 2 x 2 x 4 x Shift Total(mL/kg) 75(0.8)  75(0.8) NET -75  -75 Weight (kg) 93.4 93.4 93.4 93.4 93.4 93.4 LAB: 
Admission on 03/04/2021 Component Date Value  Ventricular Rate 03/04/2021 101  Atrial Rate 03/04/2021 107  QRS Duration 03/04/2021 104  Q-T Interval 03/04/2021 404  QTC Calculation (Bezet) 03/04/2021 523  Calculated R Axis 03/04/2021 84   
 Calculated T Axis 03/04/2021 32   
 Diagnosis 03/04/2021 Value:!! AGE AND GENDER SPECIFIC ECG ANALYSIS !! Sinus tachycardia with 1st degree A-V block Incomplete right bundle branch block Right ventricular hypertrophy with repolarization abnormality Abnormal ECG When compared with ECG of 04-MAR-2021 12:04, No significant change was found Confirmed by Corrinne Fairly MD (), Darnell Madison ([id number]) on 3/4/2021 3:45:58 PM 
  
 WBC 03/04/2021 32.3*  
 RBC 03/04/2021 4.97   
 HGB 03/04/2021 14.6   
 HCT 03/04/2021 43.5  MCV 03/04/2021 87.5  MCH 03/04/2021 29.4  MCHC 03/04/2021 33.6  RDW 03/04/2021 19.8*  PLATELET 15/59/2272 003  MPV 03/04/2021 10.0  ABSOLUTE NRBC 03/04/2021 0.00   
 NEUTROPHILS 03/04/2021 87*  
 LYMPHOCYTES 03/04/2021 1*  
 MONOCYTES 03/04/2021 9   
 EOSINOPHILS 03/04/2021 0*  
 BASOPHILS 03/04/2021 0   
 IMMATURE GRANULOCYTES 03/04/2021 3  ABS. NEUTROPHILS 03/04/2021 28.1*  
 ABS. LYMPHOCYTES 03/04/2021 0.3*  
 ABS. MONOCYTES 03/04/2021 2.9*  
 ABS. EOSINOPHILS 03/04/2021 0.0  ABS. BASOPHILS 03/04/2021 0.0  ABS. IMM. GRANS. 03/04/2021 1.0*  
 RBC COMMENTS 03/04/2021 Value:RARE 
SCHISTOCYTES  RBC COMMENTS 03/04/2021 Value:SLIGHT 
ANISOCYTOSIS 
  
 WBC COMMENTS 03/04/2021 Result Confirmed By Smear  PLATELET COMMENTS 24/92/7441 ADEQUATE  DF 03/04/2021 AUTOMATED  Sodium 03/04/2021 139  Potassium 03/04/2021 5.0  Chloride 03/04/2021 107   
 CO2 03/04/2021 24   
 Anion gap 03/04/2021 8  Glucose 03/04/2021 237*  BUN 03/04/2021 53*  Creatinine 03/04/2021 0.91   
 GFR est AA 03/04/2021 >60   
 GFR est non-AA 03/04/2021 >60   
 Calcium 03/04/2021 8.6  Prothrombin time 03/04/2021 14.0  INR 03/04/2021 1.1  Troponin-High Sensitivity 03/04/2021 84.1*  
 Color 03/05/2021 YELLOW  Appearance 03/05/2021 CLEAR  Specific gravity 03/05/2021 1.023   
 pH (UA) 03/05/2021 6.5  Protein 03/05/2021 Negative  Glucose 03/05/2021 250  Ketone 03/05/2021 Negative  Bilirubin 03/05/2021 Negative  Blood 03/05/2021 Negative  Urobilinogen 03/05/2021 0.2  Nitrites 03/05/2021 Negative  Leukocyte Esterase 03/05/2021 Negative  Troponin-High Sensitivity 03/04/2021 81.1*  Special Requests: 03/04/2021 PORT  Culture result: 03/04/2021 NO GROWTH 2 DAYS  Special Requests: 03/04/2021 Value:RIGHT 
FOREARM  Culture result: 03/04/2021 NO GROWTH 2 DAYS   
 Lactic acid 03/04/2021 1.7  Procalcitonin 03/04/2021 0.18  PPD 03/05/2021 Negative  mm Induration 03/05/2021 0  Ammonia 03/04/2021 27  Vitamin B12 03/04/2021 >2,000*  TSH 03/04/2021 0.057*  Glucose (POC) 03/04/2021 175*  T4, Free 03/04/2021 1.3   
 WBC 03/05/2021 27.2*  
 RBC 03/05/2021 4.36   
 HGB 03/05/2021 12.9*  
 HCT 03/05/2021 39.0*  
 MCV 03/05/2021 89.4  MCH 03/05/2021 29.6  MCHC 03/05/2021 33.1  RDW 03/05/2021 19.8*  PLATELET 31/10/2935 928  MPV 03/05/2021 10.2  ABSOLUTE NRBC 03/05/2021 0.00   
 LIPID PROFILE 03/05/2021  Cholesterol, total 03/05/2021 161  Triglyceride 03/05/2021 247*  HDL Cholesterol 03/05/2021 74*  LDL, calculated 03/05/2021 37.6  VLDL, calculated 03/05/2021 49.4*  
 CHOL/HDL Ratio 03/05/2021 2.2  Hemoglobin A1c 03/05/2021 6.9*  
 Est. average glucose 03/05/2021 151  Sodium 03/05/2021 141  Potassium 03/05/2021 4.4  Chloride 03/05/2021 109*  
 CO2 03/05/2021 29   
 Anion gap 03/05/2021 3*  Glucose 03/05/2021 154*  
 BUN 03/05/2021 41*  Creatinine 03/05/2021 0.73*  GFR est AA 03/05/2021 >60   
 GFR est non-AA 03/05/2021 >60   
 Calcium 03/05/2021 8.3  Glucose (POC) 03/05/2021 157*  Glucose (POC) 03/05/2021 165*  Glucose (POC) 03/05/2021 228*  Glucose (POC) 03/05/2021 240*  Sodium 03/05/2021 145  Potassium 03/05/2021 4.0  Chloride 03/05/2021 111*  CO2 03/05/2021 26   
 Anion gap 03/05/2021 8  Glucose 03/05/2021 184*  
 BUN 03/05/2021 41*  Creatinine 03/05/2021 0.87  GFR est AA 03/05/2021 >60   
 GFR est non-AA 03/05/2021 >60   
 Calcium 03/05/2021 8.5  Magnesium 03/05/2021 2.3   
 WBC 03/06/2021 26.7*  
 RBC 03/06/2021 4.57   
 HGB 03/06/2021 13.4*  
 HCT 03/06/2021 40.6*  MCV 03/06/2021 88.8  MCH 03/06/2021 29.3  MCHC 03/06/2021 33.0  RDW 03/06/2021 19.7*  PLATELET 51/13/7478 343  MPV 03/06/2021 10.6  ABSOLUTE NRBC 03/06/2021 0.00  DF 03/06/2021 AUTOMATED  NEUTROPHILS 03/06/2021 92*  
 LYMPHOCYTES 03/06/2021 1*  
 MONOCYTES 03/06/2021 3*  
 EOSINOPHILS 03/06/2021 1   
 BASOPHILS 03/06/2021 0   
 IMMATURE GRANULOCYTES 03/06/2021 3  ABS. NEUTROPHILS 03/06/2021 24.5*  
 ABS. LYMPHOCYTES 03/06/2021 0.4*  ABS. MONOCYTES 03/06/2021 0.7  ABS. EOSINOPHILS 03/06/2021 0.1  ABS. BASOPHILS 03/06/2021 0.1  ABS. IMM. GRANS. 03/06/2021 0.9*  Sodium 03/06/2021 145  Potassium 03/06/2021 4.0  Chloride 03/06/2021 112*  
 CO2 03/06/2021 27   
 Anion gap 03/06/2021 6*  Glucose 03/06/2021 154*  
 BUN 03/06/2021 37*  Creatinine 03/06/2021 0.70*  GFR est AA 03/06/2021 >60   
 GFR est non-AA 03/06/2021 >60   
 Calcium 03/06/2021 8.8  Glucose (POC) 03/06/2021 154*  Glucose (POC) 03/06/2021 415* IMAGING: 
Xr Chest Pa Lat Result Date: 3/4/2021 1. Interval slightly decreasing right upper lobe paramediastinal mass with resolved right basilar effusion however residual pleural thickening along right apical lateral lung segment. CPT code(s) 35123 Mri Brain W Wo Cont Result Date: 3/4/2021 1) Negative for infarction. 2) Negative for metastatic disease. No enhancing lesions. 3) Nonspecific, nonenhancing FLAIR white matter hyperintensities, likely chronic small vessel disease. Us Abd Comp Result Date: 3/5/2021 1. Tiny nonobstructing right kidney stone. 2. Small 2.4 cm left kidney cyst. 3. Markedly distended urinary bladder. The patient's nurse was notified by the ultrasound technologist.  
 
Ct Code Neuro Head Wo Contrast 
 
Result Date: 3/4/2021 1. Age-related senescent changes and chronic microvascular disease. If patient's symptoms persist, further evaluation with MRI should be considered. CPT code 35340 EKG: 
No results found for this or any previous visit.  
 
 
 
 
Emile Camacho NP 
3/6/2021 9:40 AM

## 2021-03-06 NOTE — PROGRESS NOTES
CM met with patient and patient's wife to get SNF choices. 1) [address] 2) [facility] 3) [facility] 4) [facility] Referrals sent. CM remains available. Increase amaryl to bid please.  Get the starches out of the diet.  As discussed otherwise.  Adjust med list and send new to pharmacy to get to next appt.  a1c and cmp repeat in [place] please order PRIOR to appt, so we can discuss AT appt.  Bring blood sugars to appt

## 2021-03-06 NOTE — PROGRESS NOTES
03/05/21 1901 NIH Stroke Scale Interval Other (comment) (dual NIH with Gilberto Sherwood) LOC 0  
LOC Questions 0 LOC Commands 0 Best Gaze 0 Visual 0 Facial Palsy 1 Motor Right Arm 0 Motor Left Arm 0 Motor Right Leg 1 Motor Left Leg 1 Limb Ataxia 0 Sensory 0 Best Language 0 Dysarthria 0 Extinction and Inattention 0 Total 3

## 2021-03-06 NOTE — PROGRESS NOTES
Neurology Daily Progress Note Assessment:  
 
68year old male presenting with stroke like symptoms. Presented with AMS and right facial droop, resolved. Likely secondary to toxic metabolic encephalopathy v. TIA. 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 EEG showed generalized diffuse slowing, negative for seizure. MRI brain negative for acute infarct, hemorrhage, metastatic lesion, or limbic encephalopathy. TTE negative for PFO or atrial enlargement. Leukocytosis, likely due to steroids v. underlying infection. UA negative. Continue secondary stroke prevention. No additional neurological workup is needed at this time. Will signoff. Plan:  
 
· Continue ASA 81 mg daily · Continue high intensity statin · Continue to correct metabolic or infectious etiologies. · Continue IV antibiotics per primary team. 
· Telemetry 
· PT/OT/ST 
· DVT prophylaxis · BP management - normotensive, with long-term goal <140/90 · Recommendations continue to be to avoid driving as he is clearly unsafe and would have inadequate reflexes in terms of that activity Subjective: Interval history: 
 
Somnolent. Oriented to person, place. Able to follow commands. TTE negative for PFO or atrial enlargement. Continue IV antibiotics per primary team.  
 
History: 
 
Tyler Wadsworth is a 68 y.o. male who is being seen for stroke like symptoms. Review of systems negative with exception of pertinent positives and negatives noted above. Objective:  
 
Vitals:  
 03/05/21 2000 03/06/21 0000 03/06/21 0400 03/06/21 0800 BP: (!) 146/77 (!) 146/77 (!) 156/77 (!) 156/88 Pulse: 100 100 98 (!) 102 Resp: 18 19 19 15 Temp: 97.7 °F (36.5 °C) 97.7 °F (36.5 °C) 97.5 °F (36.4 °C) 98.7 °F (37.1 °C) SpO2: 96% 96% 96% 96% Weight:      
Height:      
  
 
 
Current Facility-Administered Medications:  
  sodium chloride (NS) flush 5-40 mL, 5-40 mL, IntraVENous, Q8H, Elyse Knox NP, 10 mL at 03/06/21 0600   sodium chloride (NS) flush 5-40 mL, 5-40 mL, IntraVENous, PRN, Bhavya Delgado, NP 
  LORazepam (ATIVAN) tablet 0.5 mg, 0.5 mg, Oral, Q1H PRN, Bhavya Delgado NP 
  folic acid (FOLVITE) tablet 1 mg, 1 mg, Oral, DAILY, Bhavyamaria fernanda Delgado, NP, 1 mg at 03/06/21 9607   thiamine HCL (B-1) tablet 100 mg, 100 mg, Oral, DAILY, Bhavyamaria fernanda Delgado, NP, 100 mg at 03/06/21 9019   multivitamin, tx-iron-ca-min (THERA-M w/ IRON) tablet 1 Tab, 1 Tab, Oral, DAILY, Bhavya Delgado, NP, 1 Tab at 03/06/21 9703   ondansetron (ZOFRAN) injection 4 mg, 4 mg, IntraVENous, Q4H PRN, Merced Diaz MD 
  aspirin chewable tablet 81 mg, 81 mg, Oral, DAILY, Merced Diaz MD, 81 mg at 03/06/21 1519   atorvastatin (LIPITOR) tablet 40 mg, 40 mg, Oral, QHS, Merced Diaz MD, 40 mg at 03/05/21 2120   acetaminophen (TYLENOL) tablet 650 mg, 650 mg, Oral, Q4H PRN, Merced Diaz MD 
  labetaloL (NORMODYNE;TRANDATE) injection 5 mg, 5 mg, IntraVENous, Q10MIN PRN, Merced Diaz MD 
  polyethylene glycol (MIRALAX) packet 17 g, 17 g, Oral, DAILY PRN, Merced Diaz MD 
  enoxaparin (LOVENOX) injection 40 mg, 40 mg, SubCUTAneous, Q24H, Merced Diaz MD, 40 mg at 03/05/21 1634 
  insulin lispro (HUMALOG) injection 0-10 Units, 0-10 Units, SubCUTAneous, Karrie Martinez MD, 2 Units at 03/06/21 6001 Recent Results (from the past 12 hour(s)) CBC WITH AUTOMATED DIFF Collection Time: 03/06/21  4:57 AM  
Result Value Ref Range WBC 26.7 (H) 4.3 - 11.1 K/uL  
 RBC 4.57 4.23 - 5.6 M/uL  
 HGB 13.4 (L) 13.6 - 17.2 g/dL HCT 40.6 (L) 41.1 - 50.3 % MCV 88.8 79.6 - 97.8 FL  
 MCH 29.3 26.1 - 32.9 PG  
 MCHC 33.0 31.4 - 35.0 g/dL  
 RDW 19.7 (H) 11.9 - 14.6 % PLATELET 393 147 - 224 K/uL MPV 10.6 9.4 - 12.3 FL ABSOLUTE NRBC 0.00 0.0 - 0.2 K/uL  
 DF AUTOMATED NEUTROPHILS 92 (H) 43 - 78 % LYMPHOCYTES 1 (L) 13 - 44 % MONOCYTES 3 (L) 4.0 - 12.0 % EOSINOPHILS 1 0.5 - 7.8 %  BASOPHILS 0 0.0 - 2.0 %  
 IMMATURE GRANULOCYTES 3 0.0 - 5.0 %  
 ABS. NEUTROPHILS 24.5 (H) 1.7 - 8.2 K/UL  
 ABS. LYMPHOCYTES 0.4 (L) 0.5 - 4.6 K/UL  
 ABS. MONOCYTES 0.7 0.1 - 1.3 K/UL  
 ABS. EOSINOPHILS 0.1 0.0 - 0.8 K/UL  
 ABS. BASOPHILS 0.1 0.0 - 0.2 K/UL  
 ABS. IMM. GRANS. 0.9 (H) 0.0 - 0.5 K/UL METABOLIC PANEL, BASIC Collection Time: 03/06/21  4:57 AM  
Result Value Ref Range Sodium 145 136 - 145 mmol/L Potassium 4.0 3.5 - 5.1 mmol/L Chloride 112 (H) 98 - 107 mmol/L  
 CO2 27 21 - 32 mmol/L Anion gap 6 (L) 7 - 16 mmol/L Glucose 154 (H) 65 - 100 mg/dL BUN 37 (H) 8 - 23 MG/DL Creatinine 0.70 (L) 0.8 - 1.5 MG/DL  
 GFR est AA >60 >60 ml/min/1.73m2 GFR est non-AA >60 >60 ml/min/1.73m2 Calcium 8.8 8.3 - 10.4 MG/DL  
GLUCOSE, POC Collection Time: 03/06/21  6:04 AM  
Result Value Ref Range Glucose (POC) 154 (H) 65 - 100 mg/dL MRI Results (most recent): 
Results from Hospital Encounter encounter on 03/04/21 MRI BRAIN W WO CONT Narrative MRI BRAIN WITHOUT and WITH CONTRAST. HISTORY:  Acute confusion right-sided facial droop. Small cell lung carcinoma. COMPARISON:  None. Study performed within 24 hours of admission. TECHNIQUE:  Sagittal T1, axial T1, T2, FLAIR, gradient echo, diffusion with ADC 
map were followed by 19cc intravenous gadolinium after which axial and coronal 
T1 images were repeated. Intravenous contrast was given to increase specificity 
of T2 abnormalities. FINDINGS:  
-Diffusion images: No any areas of restricted diffusion to suggest acute 
infarction.   
-No midline shift, mass or mass effect. -Gradient echo images: are unremarkable. -FLAIR sequence images: Fairly extensive bilateral centrum semiovale and corona 
radiata white matter hyperintensities. 
-No evidence of acute hemorrhage.   
-Lateral ventricles are: normal size.   
-Pituitary and parasellar structures: unremarkable on the sagittal T1 images. -There are normal T2 flow-voids in the major vessels.    
-Posterior fossa structures are unremarkable.   
-Basal ganglia: appear symmetric.   
-Orbits: are grossly normal.  
-Paranasal sinuses: are clear. 
-Contrast: No enhancing nodules or masses. 
-Other: None. Impression 1) Negative for infarction. 2) Negative for metastatic disease. No enhancing lesions. 3) Nonspecific, nonenhancing FLAIR white matter hyperintensities, likely chronic 
small vessel disease. Physical Exam: 
General - Elderly, well nourished, in no apparent distress. Pleasant and conversent. HEENT - Normocephalic, atraumatic. Conjunctiva are clear. Neck - Supple without masses Cardiovascular - Regular rate and rhythm. Normal S1, S2 without murmurs, rubs, or gallops. Lungs - Clear to auscultation. Abdomen - Soft, nontender with normal bowel sounds. Extremities - Peripheral pulses intact. No edema and no rashes. Neurological examination - Comprehension, attention, memory and reasoning are intact. Language and speech are normal.  
On cranial nerve examination, (II, III, IV, VI) pupils are equal, round, and reactive to light. Visual acuity is adequate. Visual fields are full to finger confrontation. Extraocular motility is normal. (V, VII) Face is symmetric and sensation is intact to light touch. (VIII) Hearing is intact. (IX, X) Palate and uvula elevate symmetrically. Voice is normal. (XI) Shoulder shrug is strong and equal bilaterally. (XII)Tongue is midline. Motor examination - There is normal muscle tone and bulk. Moves all extremities spontaneously and to command. Muscle stretch reflexes are normoactive and there are no pathological reflexes present. Plantar response is flexor. Sensation is intact to light touch, pinprick, in all extremities. Cerebellar examination is normal. Gait and stance unsteady, ambulating with walker, von Jackie apraxia. Heddy  Signed By: Zoila Rivas NP March 6, 2021 Neurology attending Seen and examined and continues to have rather profoundly abnormal mental status. Underlying etiology of the patient's marked leukocytosis is unclear From a neurological standpoint this appears to have been a progressive subacute process and the events that occurred 2 weeks ago with the patient driving for 3 hours and basically being unaware of what was going on clearly mandate that he requires considerably greater supervision in the future.  
 
I am skeptical however that any primary neurologic modulation would be potentially beneficial 
No evidence of seizures have been seen while he has been on hospital 
 
Available on request

## 2021-03-06 NOTE — PROGRESS NOTES
03/06/21 4928 NIH Stroke Scale Interval Other (comment) LOC 0  
LOC Questions 0 LOC Commands 0 Best Gaze 0 Visual 0 Facial Palsy 1 Motor Right Arm 0 Motor Left Arm 0 Motor Right Leg 1 Motor Left Leg 1 Limb Ataxia 0 Sensory 0 Best Language 0 Dysarthria 0 Extinction and Inattention 0 Total 3 Dual NIH w/ Jesse Schaumann

## 2021-03-07 NOTE — PROGRESS NOTES
[employer] Hospitalist Service Progress Note INTERVAL HISTORY  / Subjective: 
74-year-old male history of metastatic lung cancer presents with acute right facial droop and waxing and waning confusion, admitted for encephalopathy, rule out stroke and leukocytosis of unclear origin 3/7 More alert, alert and oriented to name, place. **Discharge plan: SNF Assessment / Plan: 
Encephalopathy with very mild right facial droop 
-Check TSH, ammonia level, EEG, MRI brain with and without contrast no  Acute findings, . B12 noted 2000 
- PT OT speech therapy eval. echo with bubble study. Defer neurovascular imaging unless neurology feels this is warranted. 3/6  EEG showed generalized diffuse slowing, negative for seizure. MRI brain negative for acute infarct, hemorrhage, metastatic lesion, or limbic encephalopathy. TTE negative for PFO or atrial enlargement Neurology has signed off, no additional neuro work up needed. 
  
Leukocytosis 
-Unclear if occult infection or steroid related 
- lactic acid normal, blood cultures NGTD, urine cultures, procalcitonin negative 
-Abdomen distended, c abdominal ultrasound 
-Empiric antibiotics until cultures negativeVanc,  Zosyn 
-Steroids can blunt febrile response to infection 3/5 holding ABT for now, UA pending, US abdomen showed distended bladder but per nursing, large void after getting back to unit. Likely related to Decadron 3/6 noted 26.7 today, likely steroid related. UA negative, trend, no ABT 
  
Stage IV lung adenocarcinoma- 
oncology on board 
  
EtOH abuse 
 (admits to daily drinking), PS 2 (lives independently with wife, able to do most activities with some help) Thiamine, Folic acid, Multivitamin Ativan prn 
3/5 calm, cooperative 3/6 no signs of withdrawal 
 
 hypothyroidism Home medications Hypertension Home medications Dyslipidemia High dose statin 
 
obese habitus -Continue PTA meds once passes speech evaluation-appears unsafe to swallow currently 
-Rest of plan per hospital course Chief Complaint : Facial Droop Objective: 
Visit Vitals BP (!) 151/91 (BP 1 Location: Right upper arm, BP Patient Position: At rest) Pulse (!) 106 Temp 98.1 °F (36.7 °C) Resp 16 Ht 5' 7\" (1.702 m) Wt 93.4 kg (205 lb 14.6 oz) SpO2 96% BMI 32.25 kg/m² Physical Exam: 
General:          Blank stare, inattentive, AO x 1 (not to place or time). Requires frequent redirection Eyes:               Normal sclerae. Extraocular movements intact. HENT:             Normocephalic, atraumatic. Moist mucous membranes CV:                  RRR. No m/r/g. Lungs:             CTAB. No wheezing, rhonchi, or rales. Abdomen:        Soft, nontender, positive distention. Extremities:     Warm and dry. No cyanosis or edema. Intention tremor Neurologic:      CN II-XII grossly intact. Sensation intact. Skin:                No rashes or jaundice. Normal coloration Psych:             Normal mood and affect. Intake and Output: 
 
 
LAB: 
Admission on 03/04/2021 Component Date Value  Ventricular Rate 03/04/2021 101  Atrial Rate 03/04/2021 107  QRS Duration 03/04/2021 104  Q-T Interval 03/04/2021 404  QTC Calculation (Bezet) 03/04/2021 523  Calculated R Axis 03/04/2021 84   
 Calculated T Axis 03/04/2021 32   
 Diagnosis 03/04/2021 Value:!! AGE AND GENDER SPECIFIC ECG ANALYSIS !! Sinus tachycardia with 1st degree A-V block Incomplete right bundle branch block Right ventricular hypertrophy with repolarization abnormality Abnormal ECG When compared with ECG of 04-MAR-2021 12:04, No significant change was found Confirmed by Gregg Perera MD (), Radha Diego ([id number]) on 3/4/2021 3:45:58 PM 
  
 WBC 03/04/2021 32.3*  
 RBC 03/04/2021 4.97   
 HGB 03/04/2021 14.6   
 HCT 03/04/2021 43.5  MCV 03/04/2021 87.5  MCH 03/04/2021 29.4  MCHC 03/04/2021 33.6  RDW 03/04/2021 19.8*  PLATELET 80/06/1111 208  MPV 03/04/2021 10.0  ABSOLUTE NRBC 03/04/2021 0.00   
 NEUTROPHILS 03/04/2021 87*  
 LYMPHOCYTES 03/04/2021 1*  
 MONOCYTES 03/04/2021 9   
 EOSINOPHILS 03/04/2021 0*  
 BASOPHILS 03/04/2021 0   
 IMMATURE GRANULOCYTES 03/04/2021 3  ABS. NEUTROPHILS 03/04/2021 28.1*  
 ABS. LYMPHOCYTES 03/04/2021 0.3*  
 ABS. MONOCYTES 03/04/2021 2.9*  
 ABS. EOSINOPHILS 03/04/2021 0.0  ABS. BASOPHILS 03/04/2021 0.0  ABS. IMM. GRANS. 03/04/2021 1.0*  
 RBC COMMENTS 03/04/2021 Value:RARE 
SCHISTOCYTES  RBC COMMENTS 03/04/2021 Value:SLIGHT 
ANISOCYTOSIS 
  
 WBC COMMENTS 03/04/2021 Result Confirmed By Smear  PLATELET COMMENTS 08/65/7849 ADEQUATE  DF 03/04/2021 AUTOMATED  Sodium 03/04/2021 139  Potassium 03/04/2021 5.0  Chloride 03/04/2021 107   
 CO2 03/04/2021 24   
 Anion gap 03/04/2021 8  Glucose 03/04/2021 237*  BUN 03/04/2021 53*  Creatinine 03/04/2021 0.91   
 GFR est AA 03/04/2021 >60   
 GFR est non-AA 03/04/2021 >60   
 Calcium 03/04/2021 8.6  Prothrombin time 03/04/2021 14.0  INR 03/04/2021 1.1  Troponin-High Sensitivity 03/04/2021 84.1*  
 Color 03/05/2021 YELLOW  Appearance 03/05/2021 CLEAR  Specific gravity 03/05/2021 1.023   
 pH (UA) 03/05/2021 6.5  Protein 03/05/2021 Negative  Glucose 03/05/2021 250  Ketone 03/05/2021 Negative  Bilirubin 03/05/2021 Negative  Blood 03/05/2021 Negative  Urobilinogen 03/05/2021 0.2  Nitrites 03/05/2021 Negative  Leukocyte Esterase 03/05/2021 Negative  Troponin-High Sensitivity 03/04/2021 81.1*  Special Requests: 03/04/2021 PORT  Culture result: 03/04/2021 NO GROWTH 3 DAYS  Special Requests: 03/04/2021 Value:RIGHT 
FOREARM  Culture result: 03/04/2021 NO GROWTH 3 DAYS  Lactic acid 03/04/2021 1.7  Procalcitonin 03/04/2021 0.18  PPD 03/05/2021 Negative  mm Induration 03/05/2021 0  PPD 03/06/2021 Negative  mm Induration 03/06/2021 0  Ammonia 03/04/2021 27  Vitamin B12 03/04/2021 >2,000*  TSH 03/04/2021 0.057*  Glucose (POC) 03/04/2021 175*  T4, Free 03/04/2021 1.3   
 WBC 03/05/2021 27.2*  
 RBC 03/05/2021 4.36   
 HGB 03/05/2021 12.9*  
 HCT 03/05/2021 39.0*  
 MCV 03/05/2021 89.4  MCH 03/05/2021 29.6  MCHC 03/05/2021 33.1  RDW 03/05/2021 19.8*  PLATELET 37/55/0896 119  MPV 03/05/2021 10.2  ABSOLUTE NRBC 03/05/2021 0.00   
 LIPID PROFILE 03/05/2021  Cholesterol, total 03/05/2021 161  Triglyceride 03/05/2021 247*  HDL Cholesterol 03/05/2021 74*  LDL, calculated 03/05/2021 37.6  VLDL, calculated 03/05/2021 49.4*  
 CHOL/HDL Ratio 03/05/2021 2.2  Hemoglobin A1c 03/05/2021 6.9*  
 Est. average glucose 03/05/2021 151  Sodium 03/05/2021 141  Potassium 03/05/2021 4.4  Chloride 03/05/2021 109*  
 CO2 03/05/2021 29   
 Anion gap 03/05/2021 3*  Glucose 03/05/2021 154*  
 BUN 03/05/2021 41*  Creatinine 03/05/2021 0.73*  GFR est AA 03/05/2021 >60   
 GFR est non-AA 03/05/2021 >60   
 Calcium 03/05/2021 8.3  Glucose (POC) 03/05/2021 157*  Glucose (POC) 03/05/2021 165*  Glucose (POC) 03/05/2021 228*  Glucose (POC) 03/05/2021 240*  Sodium 03/05/2021 145  Potassium 03/05/2021 4.0  Chloride 03/05/2021 111*  
 CO2 03/05/2021 26   
 Anion gap 03/05/2021 8  Glucose 03/05/2021 184*  
 BUN 03/05/2021 41*  Creatinine 03/05/2021 0.87  GFR est AA 03/05/2021 >60   
 GFR est non-AA 03/05/2021 >60   
 Calcium 03/05/2021 8.5  Magnesium 03/05/2021 2.3   
 WBC 03/06/2021 26.7*  
 RBC 03/06/2021 4.57   
 HGB 03/06/2021 13.4*  
 HCT 03/06/2021 40.6*  MCV 03/06/2021 88.8  MCH 03/06/2021 29.3  MCHC 03/06/2021 33.0  RDW 03/06/2021 19.7*  PLATELET 58/65/7947 009  MPV 03/06/2021 10.6  ABSOLUTE NRBC 03/06/2021 0.00  DF 03/06/2021 AUTOMATED  NEUTROPHILS 03/06/2021 92*  
 LYMPHOCYTES 03/06/2021 1*  
 MONOCYTES 03/06/2021 3*  
 EOSINOPHILS 03/06/2021 1   
 BASOPHILS 03/06/2021 0   
 IMMATURE GRANULOCYTES 03/06/2021 3  ABS. NEUTROPHILS 03/06/2021 24.5*  
 ABS. LYMPHOCYTES 03/06/2021 0.4*  ABS. MONOCYTES 03/06/2021 0.7  ABS. EOSINOPHILS 03/06/2021 0.1  ABS. BASOPHILS 03/06/2021 0.1  ABS. IMM. GRANS. 03/06/2021 0.9*  Sodium 03/06/2021 145  Potassium 03/06/2021 4.0  Chloride 03/06/2021 112*  
 CO2 03/06/2021 27   
 Anion gap 03/06/2021 6*  Glucose 03/06/2021 154*  
 BUN 03/06/2021 37*  Creatinine 03/06/2021 0.70*  GFR est AA 03/06/2021 >60   
 GFR est non-AA 03/06/2021 >60   
 Calcium 03/06/2021 8.8  Glucose (POC) 03/06/2021 154*  Glucose (POC) 03/06/2021 415*  Glucose (POC) 03/06/2021 164*  WBC 03/07/2021 26.9*  
 RBC 03/07/2021 4.69   
 HGB 03/07/2021 13.8  HCT 03/07/2021 41.8  MCV 03/07/2021 89.1  MCH 03/07/2021 29.4  MCHC 03/07/2021 33.0  RDW 03/07/2021 20.3*  PLATELET 98/82/5231 254  MPV 03/07/2021 10.7  ABSOLUTE NRBC 03/07/2021 0.00  DF 03/07/2021 AUTOMATED  NEUTROPHILS 03/07/2021 93*  
 LYMPHOCYTES 03/07/2021 2*  
 MONOCYTES 03/07/2021 2*  
 EOSINOPHILS 03/07/2021 0*  
 BASOPHILS 03/07/2021 0   
 IMMATURE GRANULOCYTES 03/07/2021 3  ABS. NEUTROPHILS 03/07/2021 25.0*  
 ABS. LYMPHOCYTES 03/07/2021 0.4*  ABS. MONOCYTES 03/07/2021 0.5  ABS. EOSINOPHILS 03/07/2021 0.1  ABS. BASOPHILS 03/07/2021 0.1  ABS. IMM. GRANS. 03/07/2021 0.8*  Sodium 03/07/2021 145  Potassium 03/07/2021 4.2  Chloride 03/07/2021 112*  
 CO2 03/07/2021 25   
 Anion gap 03/07/2021 8  Glucose 03/07/2021 150*  BUN 03/07/2021 46*  Creatinine 03/07/2021 0.96   
 GFR est AA 03/07/2021 >60   
  GFR est non-AA 03/07/2021 >60   
 Calcium 03/07/2021 8.9  Glucose (POC) 03/07/2021 165* IMAGING: 
Xr Chest Pa Lat Result Date: 3/4/2021 1. Interval slightly decreasing right upper lobe paramediastinal mass with resolved right basilar effusion however residual pleural thickening along right apical lateral lung segment. CPT code(s) 07774 Mri Brain W Wo Cont Result Date: 3/4/2021 1) Negative for infarction. 2) Negative for metastatic disease. No enhancing lesions. 3) Nonspecific, nonenhancing FLAIR white matter hyperintensities, likely chronic small vessel disease. Us Abd Comp Result Date: 3/5/2021 1. Tiny nonobstructing right kidney stone. 2. Small 2.4 cm left kidney cyst. 3. Markedly distended urinary bladder. The patient's nurse was notified by the ultrasound technologist.  
 
Ct Code Neuro Head Wo Contrast 
 
Result Date: 3/4/2021 1. Age-related senescent changes and chronic microvascular disease. If patient's symptoms persist, further evaluation with MRI should be considered. CPT code 00448 EKG: 
No results found for this or any previous visit.  
 
 
 
 
Nichole Nagel NP 
3/7/2021 9:40 AM

## 2021-03-07 NOTE — PROGRESS NOTES
03/06/21 1900 NIH Stroke Scale Interval Other (comment) LOC 0  
LOC Questions 0 LOC Commands 0 Best Gaze 0 Visual 0 Facial Palsy 1 Motor Right Arm 0 Motor Left Arm 0 Motor Right Leg 1 Motor Left Leg 1 Limb Ataxia 0 Sensory 0 Best Language 0 Dysarthria 0 Extinction and Inattention 0 Total 3 Dual NIH alpa/ Marybeth Neal

## 2021-03-07 NOTE — PROGRESS NOTES
03/07/21 0703 NIH Stroke Scale Interval Other (comment) LOC 0  
LOC Questions 0 LOC Commands 0 Best Gaze 0 Visual 0 Facial Palsy 1 Motor Right Arm 0 Motor Left Arm 0 Motor Right Leg 1 Motor Left Leg 1 Limb Ataxia 0 Sensory 0 Best Language 0 Dysarthria 0 Extinction and Inattention 0 Total 3 Dual NIH alpa/ Merry Parsley

## 2021-03-08 NOTE — PROGRESS NOTES
[facility] Hospitalist Service Progress Note INTERVAL HISTORY  / Subjective: 
24-year-old male history of metastatic lung cancer presents with acute right facial droop and waxing and waning confusion, admitted for encephalopathy, rule out stroke and leukocytosis of unclear origin 3/8 Lethargic, alert and oriented to name, place. Plans for radiation today. **Discharge plan: SNF-patient receiving palliative radiation with last dose 3/16. Many SNF will not accept patients receiving radiation. I did speak with CM and spouse along with son. Family will elect to forego radiation if SNF will not allow. Hospice consult placed. [employer] message sent to oncology. Assessment / Plan: 
Encephalopathy with very mild right facial droop 
-Check TSH, ammonia level, EEG, MRI brain with and without contrast no  Acute findings, . B12 noted 2000 
- PT OT speech therapy eval. echo with bubble study. Defer neurovascular imaging unless neurology feels this is warranted. 3/6  EEG showed generalized diffuse slowing, negative for seizure. MRI brain negative for acute infarct, hemorrhage, metastatic lesion, or limbic encephalopathy. TTE negative for PFO or atrial enlargement Neurology has signed off, no additional neuro work up needed. 3/8 mentation continues to wax and wane. Somnolent, wakens easily, knows name,  Place. Plans for radiation today at [facility]. 
  
Leukocytosis 
-Unclear if occult infection or steroid related 
- lactic acid normal, blood cultures NGTD, urine cultures, procalcitonin negative 
-Abdomen distended, c abdominal ultrasound 
-Empiric antibiotics until cultures negativeVanc,  Zosyn 
-Steroids can blunt febrile response to infection 3/5 holding ABT for now, UA pending, US abdomen showed distended bladder but per nursing, large void after getting back to unit. Likely related to Decadron 3/6 noted 26.7 today, likely steroid related.  UA negative, trend, no ABT 
  
 Stage IV lung adenocarcinoma- 
oncology on board 
  
EtOH abuse 
 (admits to daily drinking), PS 2 (lives independently with wife, able to do most activities with some help) Thiamine, Folic acid, Multivitamin Ativan prn 
3/5 calm, cooperative 3/6 no signs of withdrawal 
3/8 mentation continues to wax and wane. Somnolent, wakens easily, knows name,  Place. hypothyroidism Home medications Hypertension Home medications Dyslipidemia High dose statin 
 
obese habitus 
-Continue PTA meds once passes speech evaluation-appears unsafe to swallow currently 
-Rest of plan per hospital course Chief Complaint : Facial Droop Objective: 
Visit Vitals BP (!) 144/85 (BP 1 Location: Right upper arm, BP Patient Position: Lying) Pulse 95 Temp 97.9 °F (36.6 °C) Resp 18 Ht 5' 7\" (1.702 m) Wt 93.4 kg (205 lb 14.6 oz) SpO2 97% BMI 32.25 kg/m² Physical Exam: 
General: somnolent, easily awakens, knows name, place. Eyes:               Normal sclerae. Extraocular movements intact. HENT:             Normocephalic, atraumatic. Moist mucous membranes CV:                  RRR. No m/r/g. Lungs:             CTAB. No wheezing, rhonchi, or rales. Abdomen:        Soft, nontender, positive distention. Extremities:     Warm and dry. No cyanosis or edema. Intention tremor Neurologic:      CN II-XII grossly intact. Sensation intact. Skin:                No rashes or jaundice. Normal coloration Psych:             Normal mood and affect. Intake and Output: 
Date 03/07/21 0700 - 03/08/21 0659 03/08/21 0700 - 03/09/21 0014 Shift [id number] 1900-0659 24 Hour Total 3761-0101 2781-9190 24 Hour Total  
INTAKE  
P.O.    120  120  
  P. O.    120  120 Shift Total(mL/kg)    120(1.3)  120(1.3) OUTPUT Urine(mL/kg/hr) Urine Occurrence(s) 1 x 1 x 2 x Stool Stool Occurrence(s) 1 x 1 x 2 x Shift Total(mL/kg) NET    120  120 Weight (kg) 93.4 93.4 93.4 93.4 93.4 93.4 LAB: 
No results displayed because visit has over 200 results. IMAGING: 
Xr Chest Pa Lat Result Date: 3/4/2021 1. Interval slightly decreasing right upper lobe paramediastinal mass with resolved right basilar effusion however residual pleural thickening along right apical lateral lung segment. CPT code(s) 50981 Mri Brain W Wo Cont Result Date: 3/4/2021 1) Negative for infarction. 2) Negative for metastatic disease. No enhancing lesions. 3) Nonspecific, nonenhancing FLAIR white matter hyperintensities, likely chronic small vessel disease. Us Abd Comp Result Date: 3/5/2021 1. Tiny nonobstructing right kidney stone. 2. Small 2.4 cm left kidney cyst. 3. Markedly distended urinary bladder. The patient's nurse was notified by the ultrasound technologist.  
 
Ct Code Neuro Head Wo Contrast 
 
Result Date: 3/4/2021 1. Age-related senescent changes and chronic microvascular disease. If patient's symptoms persist, further evaluation with MRI should be considered. CPT code 19046 EKG: 
No results found for this or any previous visit.  
 
 
 
 
Clover Loja NP 
3/8/2021 9:40 AM

## 2021-03-08 NOTE — PROGRESS NOTES
Pt has been accepted for admission to [facility] and will still be able to go to radiation. The other 3 facilities declined to accept the pt. TC to pt's spouse and stepson. They accepted the bed offer. SW faxed calendar of pt's upcoming appointments through 4/6/21  to [facility]. SW to continue to follow to facilitate pt's transfer to rehab at [place].

## 2021-03-08 NOTE — PROGRESS NOTES
Initial visit by  to convey care and concern and to explore spiritual needs. Patient appeared asleep and I did not wake him. Chaplains remain available for follow-up care. Marbella Massey MDiv Board Certified [place]

## 2021-03-08 NOTE — PROGRESS NOTES
Physical Therapy Note: 
 
Chart reviewed for physical therapy treatment. Per chart review, patient at cancer [place] [facility] for radiation treatment this AM. Will try again at a later date/time as schedule permits and patient available to be seen. Thank you, Sherrie Saeed, PT, DPT

## 2021-03-08 NOTE — PROGRESS NOTES
ACUTE OT GOALS: 
(Developed with and agreed upon by patient and/or caregiver.) 1. Patient will complete lower body bathing and dressing with minimal assistance and adaptive equipment as needed. 2. Patient will complete toileting with minimal assistance. 3. Patient will tolerate 25 minutes of OT treatment with 1-2 rest breaks to increase activity tolerance for ADLs. 4. Patient will complete functional transfers with stand by assistance and adaptive equipment as needed. 5. Patient will tolerate 10 minutes BUE exercises to increase strength for safe, functional transfers.  
  
Timeframe: 7 visits OCCUPATIONAL THERAPY: Daily Note OT Treatment Day # 2 Jodi Munguia is a 68 y.o. male PRIMARY DIAGNOSIS: Acute ischemic stroke ([facility] Acute ischemic stroke ([facility] [I63.9] Pay[place] / Plan[place] PART A AND B / Product Type: Medicare /  
ASSESSMENT:  
 
REHAB RECOMMENDATIONS: CURRENT LEVEL OF FUNCTION: 
(Most Recently Demonstrated) Recommendation to date pending progress: 
Setting: 
? Short-term Rehab Equipment: ? To Be Determined Bathing: 
? Not tested Dressing: 
? Not tested Feeding/Grooming: ? Maximal Assistance Toileting: 
? Not tested Functional Mobility: ? Total Assistance ASSESSMENT: 
Mr. Agustin Ordoñez is a 69 y/o male presents with R sided weakness after CVA workup, altered mental status, and metastatic lung cancer. Pt was difficult to arouse and keep awake today. Pt max A to wash face in bed to increase arousal. Pt total A to attempt to sit EOB, pt unable ot keep eyes open throughout treatment. Pt rigid with movements and unable to follow simple commands today. RN notified. Pt is currently functioning below baseline and would continue to benefit from skilled OT services to address deficits and goals. Rec STR at d/c.  
 
SUBJECTIVE:  
Mr. Agustin Ordoñez states, \"I'm ok. \" 
 
 SOCIAL HISTORY/LIVING ENVIRONMENT: Pt lives with wife and per chart, is min A for ADLs at baseline. Pt is a questionable historian secondary to AMS stating he uses a w/c at baseline and stands up to bathe. Pt reported using a RW for ambulation household distances. Home Environment: Private residence Living Alone: No 
Support Systems: Spouse/Significant Other/Partner OBJECTIVE:  
 
PAIN: VITAL SIGNS: LINES/DRAINS:  
Pre Treatment: Pain Screen Pain Scale 1: FLACC Pain Intensity 1: 1 Post Treatment: same, RN notified  IV 
O2 Device: Room air ACTIVITIES OF DAILY LIVING: I Mod I S SBA CGA Min Mod Max Total NT Comments BASIC ADLs:             
Bathing/ Showering [] [] [] [] [] [] [] [] [] [] Toileting [] [] [] [] [] [] [] [] [] [] Dressing [] [] [] [] [] [] [] [] [] [] Feeding [] [] [] [] [] [] [] [] [] []   
Grooming [] [] [] [] [] [] [] [x] [] [] Washing face in bed Personal Device Care [] [] [] [] [] [] [] [] [] [] Functional Mobility [] [] [] [] [] [] [] [] [x] [] I=Independent, Mod I=Modified Independent, S=Supervision, SBA=Standby Assistance, CGA=Contact Guard Assistance,  
Min=Minimal Assistance, Mod=Moderate Assistance, Max=Maximal Assistance, Total=Total Assistance, NT=Not Tested MOBILITY: I Mod I S SBA CGA Min Mod Max Total  NT x2 Comments:  
Supine to sit [] [] [] [] [] [] [] [] [x] [] [] Attempt, pt rigid with movement and unable to lift legs Sit to supine [] [] [] [] [] [] [] [] [x] [] [] Sit to stand [] [] [] [] [] [] [] [] [] [] [] Bed to chair [] [] [] [] [] [] [] [] [] [] [] I=Independent, Mod I=Modified Independent, S=Supervision, SBA=Standby Assistance, CGA=Contact Guard Assistance,  
Min=Minimal Assistance, Mod=Moderate Assistance, Max=Maximal Assistance, Total=Total Assistance, NT=Not Tested PLAN:  
FREQUENCY/DURATION: OT Plan of Care: 3 times/week for duration of hospital stay or until stated goals are met, whichever comes first. 
 
TREATMENT:  
 TREATMENT:  
($$ Self Care/Home Management: 8-22 mins    ) Self Care (13 Minutes): Self care including Grooming to increase independence and decrease level of assistance required. AFTER TREATMENT POSITION/PRECAUTIONS: 
Alarm Activated, Bed, Needs within reach and RN notified INTERDISCIPLINARY COLLABORATION: 
RN/PCT and OT/ROOENY TOTAL TREATMENT DURATION: 
OT Patient Time In/Time Out Time In: 0505 Time Out: 1051 Carolina Alfonso OT

## 2021-03-08 NOTE — PROGRESS NOTES
03/08/21 0715 NIH Stroke Scale Interval Other (comment) LOC 0  
LOC Questions 0 LOC Commands 0 Best Gaze 0 Visual 0 Facial Palsy 1 Motor Right Arm 0 Motor Left Arm 0 Motor Right Leg 1 Motor Left Leg 1 Limb Ataxia 0 Sensory 0 Best Language 0 Dysarthria 0 Extinction and Inattention 0 Total 3 Dual NIH w/ Carlosle Kins

## 2021-03-08 NOTE — PROGRESS NOTES
SPEECH PATHOLOGY NOTE: 
Attempted to see patient for swallowing therapy, but he has left for the cancer center for radiation tx. Will try again at a later date/time as schedule permits and patient available to be seen.  
Marce Mcdonald MA, CCC-SLP

## 2021-03-08 NOTE — PROGRESS NOTES
03/08/21 0000 Vital Signs Temp 97.6 °F (36.4 °C) Temp Source Axillary Pulse (Heart Rate) 86 Heart Rate Source Monitor Resp Rate 19  
O2 Sat (%) 95 % Level of Consciousness Alert  
BP (!) 174/95 MAP (Calculated) 121 BP 1 Method Automatic  
BP 1 Location Right upper arm BP Patient Position At rest  
MEWS Score 1 MD notified. Hydralazine 50mg po ordered once

## 2021-03-08 NOTE — PROGRESS NOTES
Pt leaving floor to go to radiation treatment. Vitals: bp- 156/93, pulse-103, o2 98% room air. Pt stable and provider notified of drowsiness

## 2021-03-08 NOTE — PROGRESS NOTES
03/07/21 1910 NIH Stroke Scale Interval Other (comment) 
(Dual with Kyler Dean RN)  
LOC 0  
LOC Questions 0 LOC Commands 0 Best Gaze 0 Visual 0 Facial Palsy 1 Motor Right Arm 0 Motor Left Arm 0 Motor Right Leg 1 Motor Left Leg 1 Limb Ataxia 0 Sensory 0 Best Language 0 Dysarthria 0 Extinction and Inattention 0 Total 3

## 2021-03-09 NOTE — TELEPHONE ENCOUNTER
I called [place] [phone] regarding patient transport to and from [phone]. Pt will be in [address] room 9 per  Andreina Judge. [facility] for 3-10-21 to 3-12-21. I called [facility] to notify them of pt transfer to [place] [id number]. 7 Tala Ng notified. She requested that transport be arranged for 3-15-21 and 3-16-21 on Friday 3-12-21.     He Sandoval RN

## 2021-03-09 NOTE — PROGRESS NOTES
Physician Progress Note Karin Meraz 
CSN #:                  [id number] :                       1940 ADMIT DATE:       3/4/2021 11:33 AM 
DISCH DATE: 
RESPONDING 
PROVIDER #:        URBAN PATEL DO 
 
 
 
 
QUERY TEXT: 
 
Pt admitted with Acute Ischemic Stroke. Pt noted to have Cr increased over 4 days to 1.62. If possible, please document in the progress notes and discharge summary if you are evaluating and/or treating any of the following: The medical record reflects the following: 
Risk Factors: HTN, Obesity, ETOH abuse, Metastatic Lung cancer, US abd: tiny non obstructing R Kidney Stone, small L Kidney cyst, markedly distended bladder Clinical Indicators: 3/4 Cr 0.91 3/8 Cr 1.62 (1.5 rule 0.91 x 1.5 = 6.23), Metabolic Encephalopathy. 3/4 Consult: It is noted that the patient has significant leukocytosis also has renal failure may need to be addressed as they may be contributing to the confusional state  3/5 US abd: tiny non obstructing R Kidney Stone, small L Kidney cyst, markedly distended bladder Treatment: Monitoring, Apresoline 50mg x1 PO, labetalol 5mg IV /20, LR @ 100cc/hr Thank you, Kathrin Bae RN,C BSN Clinical  
[email or web address] 
Options provided: 
-- Acute kidney injury -- Other - I will add my own diagnosis -- Disagree - Not applicable / Not valid -- Disagree - Clinically unable to determine / Unknown 
-- Refer to Clinical Documentation Reviewer PROVIDER RESPONSE TEXT: 
 
This patient has an Acute kidney injury. Query created by:  Alexi Lima on 3/8/2021 5:52 PM 
 
 
Electronically signed by:  Mario Fontenot DO 3/9/2021 8:20 AM

## 2021-03-09 NOTE — DISCHARGE SUMMARY
[facility] Patient ID: 
Wilner Bazan 
male. 1940. 
[id number] Admit date: 3/4/2021 11:33 AM 
 
Discharge date: 03/09/21 Admitting Physician: Anna Burks MD 
Attending Physician: Manuel lAy DO 
Primary Care Physician: Nicholas Garcia MD  
 
Discharge Physician: Belinda Phoenix NP Discharged Condition: Stable Indication for Admission: Chief Complaint Patient presents with  Facial Droop Reason for hospitalization:  
Patient Active Problem List  
Diagnosis Code  Pleural effusion J90  
 Primary malignant neoplasm of lung metastatic to other site [facility]) C34.90  
 Non-small cell lung cancer ([facility] [address] C34.90  Acute ischemic stroke (HCC) I63.9 Discharge Diagnosis: acute encephalopathy Discharge Disposition: stable Follow up Instructions: Dr. Estefanía Moon 1month, CBC and BMP next lab day, facility MD this week. Did Patient have Sepsis (YES OR NO): No 
 
Hospital Course:  
68-year-old male history of metastatic lung cancer presents with acute right facial droop and waxing and waning confusion, admitted for encephalopathy, rule out stroke and leukocytosis of unclear origin 
  
3/9 stable for discharge to [facility]. Assessment and Plan: 
Encephalopathy with very mild right facial droop 
-Check TSH, ammonia level, EEG, MRI brain with and without contrast no  Acute findings, . B12 noted 2000 
- PT OT speech therapy eval. echo with bubble study.  Defer neurovascular imaging unless neurology feels this is warranted. 3/6  EEG showed generalized diffuse slowing, negative for seizure. MRI brain negative for acute infarct, hemorrhage, metastatic lesion, or limbic encephalopathy. TTE negative for PFO or atrial enlargement Neurology has signed off, no additional neuro work up needed. 3/8 mentation continues to wax and wane. Somnolent, wakens easily, knows name,  Place. Plans for radiation today at [facility].  
3/9 alert and oriented to baseline. Going to radiation today then [facility]. Radiation is palliative.  
  
Leukocytosis 
-Unclear if occult infection or steroid related 
- lactic acid normal, blood cultures NGTD, urine cultures, procalcitonin negative 
-Abdomen distended, c abdominal ultrasound 
-Empiric antibiotics until cultures negativeVanc,  Zosyn 
-Steroids can blunt febrile response to infection 3/5 holding ABT for now, UA pending, US abdomen showed distended bladder but per nursing, large void after getting back to unit. Likely related to Decadron 3/6 noted 26.7 today, likely steroid related. UA negative, trend, no ABT 
3/9 WBC 21.5, slight improvement. CBC recheck at facility  
  
Stage IV lung adenocarcinoma- 
oncology on board 
  
EtOH abuse 
 (admits to daily drinking), PS 2 (lives independently with wife, able to do most activities with some help) Thiamine, Folic acid, Multivitamin Ativan prn 
3/5 calm, cooperative 3/6 no signs of withdrawal 
3/8 mentation continues to wax and wane. Somnolent, wakens easily, knows name,  Place. 
  
 hypothyroidism Home medications  
  
Hypertension Home medications 
  
Dyslipidemia High dose statin 
  
obese habitus 
-Continue PTA meds once passes speech evaluation-appears unsafe to swallow currently 
-Rest of plan per hospital course KEI 
3/9 given IVF overnight, Cr.1.63, poor oral intake. Facility will continue to monitor. Discharge Exam: 
Visit Vitals BP (!) 181/87 (BP 1 Location: Right upper arm, BP Patient Position: At rest) Pulse (!) 106 Temp 98.3 °F (36.8 °C) Resp 18 Ht 5' 7\" (1.702 m) Wt 93.4 kg (205 lb 14.6 oz) SpO2 95% BMI 32.25 kg/m² General: somnolent, easily awakens, knows name, place. Eyes:               Normal sclerae.  Extraocular movements intact. HENT:             Normocephalic, atraumatic.  Moist mucous membranes CV:                  RRR.  No m/r/g.   
Lungs:             CTAB.  No wheezing, rhonchi, or rales.  
Abdomen:        Soft, nontender, positive distention. Extremities:     Warm and dry.  No cyanosis or edema.  Intention tremor Neurologic:      CN II-XII grossly intact.  Sensation intact. Skin:                No rashes or jaundice.  Normal coloration Psych:             Normal mood and affect. Consults: IP CONSULT TO NEUROLOGY 
IP CONSULT TO PHYSIATRIST(REHAB MEDICINE) IP CONSULT TO ONCOLOGY 
IP CONSULT TO NEUROLOGY Code Status: Full Code Significant Diagnostic Studies:  
CMP:  
Lab Results Component Value Date/Time  (H) 03/09/2021 06:19 AM  
 K 4.5 03/09/2021 06:19 AM  
  (H) 03/09/2021 06:19 AM  
 CO2 28 03/09/2021 06:19 AM  
 AGAP 6 (L) 03/09/2021 06:19 AM  
  (H) 03/09/2021 06:19 AM  
 BUN 74 (H) 03/09/2021 06:19 AM  
 CREA 1.63 (H) 03/09/2021 06:19 AM  
 GFRAA 53 (L) 03/09/2021 06:19 AM  
 GFRNA 44 (L) 03/09/2021 06:19 AM  
 CA 9.0 03/09/2021 06:19 AM  
 
 
 
CBC:   
Lab Results Component Value Date/Time WBC 21.5 (H) 03/09/2021 06:19 AM  
 HGB 12.1 (L) 03/09/2021 06:19 AM  
 HCT 36.8 (L) 03/09/2021 06:19 AM  
  (L) 03/09/2021 06:19 AM  
 
 
Lab Results Component Value Date/Time INR 1.1 03/04/2021 11:40 AM  
 INR 1.2 05/30/2013 08:20 AM  
 INR 1.0 04/12/2010 07:20 AM  
 Prothrombin time 14.0 03/04/2021 11:40 AM  
 Prothrombin time 12.1 (H) 05/30/2013 08:20 AM  
 Prothrombin time 10.0 04/12/2010 07:20 AM  
 
 
ABG:  No results found for: PH, PHI, PCO2, PCO2I, PO2, PO2I, HCO3, HCO3I, FIO2, FIO2I No results found for: CPK, RCK1, RCK2, RCK3, RCK4, CKMB, CKNDX, CKND1, TROPT, TROIQ, BNPP, BNP Radiology Reports :  
[unfilled] 
 
EKG: 
EKG Results Procedure 720 Value Units Date/Time Initial ECG [[id number]] Collected: 03/04/21 1206 Order Status: Completed Updated: 03/04/21 1546 Ventricular Rate 101 BPM   
  Atrial Rate 107 BPM   
  QRS Duration 104 ms Q-T Interval 404 ms QTC Calculation (Bezet) 523 ms Calculated R Axis 84 degrees Calculated T Axis 32 degrees Diagnosis --  
  !! AGE AND GENDER SPECIFIC ECG ANALYSIS !! Sinus tachycardia with 1st degree A-V block Incomplete right bundle branch block Right ventricular hypertrophy with repolarization abnormality Abnormal ECG When compared with ECG of 04-MAR-2021 12:04, No significant change was found Confirmed by [facility] JOSE EDUARDO & LIAM [facility] [place]  MD (), Radha Diego ([id number]) on 3/4/2021 3:45:58 PM 
  
  
 
 
Discharge Medications: 
Current Discharge Medication List  
  
CONTINUE these medications which have NOT CHANGED Details  
dexAMETHasone (DECADRON) 4 mg tablet One tab three times a day 
Qty: 90 Tab, Refills: 0  
  
folic acid (FOLVITE) 1 mg tablet Take 1 Tab by mouth daily. Qty: 30 Tab, Refills: 5  
  
prochlorperazine (Compazine) 10 mg tablet Take 1 Tab by mouth every six (6) hours as needed for Nausea or Vomiting for up to 30 days. Indications: nausea and vomiting caused by cancer drugs Qty: 90 Tab, Refills: 3  
  
ondansetron (ZOFRAN ODT) 8 mg disintegrating tablet Take 1 Tab by mouth every eight (8) hours as needed for Nausea or Vomiting. Indications: prevent nausea and vomiting from cancer chemotherapy Qty: 90 Tab, Refills: 3  
  
furosemide (LASIX) 20 mg tablet Take 1 Tab by mouth daily. Indications: accumulation of fluid resulting from chronic heart failure Qty: 90 Tab, Refills: 1  
  
amLODIPine (NORVASC) 5 mg tablet Take 5 mg by mouth daily. cyanocobalamin (VITAMIN B-12) 1,000 mcg sublingual tablet Take 1,000 mcg by mouth every morning. Cholecalciferol, Vitamin D3, (VITAMIN D3) 1,000 unit cap Take  by mouth every morning. Thyroid, Pork, (ARMOUR THYROID) 120 mg Tab Take 150 mg by mouth daily. Takes 1 (120 mg) + 1 (30 mg) tab daily STOP taking these medications  
  
 predniSONE (DELTASONE) 10 mg tablet Comments:  
Reason for Stopping:   
   
 lidocaine-prilocaine (EMLA) topical cream Comments:  
Reason for Stopping:   
   
 HYDROcodone-acetaminophen (NORCO) 5-325 mg per tablet Comments:  
Reason for Stopping:   
   
 POTASSIUM (POTASSIMIN PO) Comments:  
Reason for Stopping:   
   
 Hydrochlorothiazide (HYDRODIURIL) 12.5 mg tablet Comments:  
Reason for Stopping:   
   
  
 
 
Medications Discontinued during this hospitalization:  
Medications Discontinued During This Encounter Medication Reason  midodrine (PROAMATINE) tablet 10 mg   
 thiamine (B-1) 100 mg in 0.9% sodium chloride 50 mL IVPB DUPLICATE ORDER  
 sodium chloride (NS) flush 3-02 mL DUPLICATE ORDER  
 sodium chloride (NS) flush 6-01 mL DUPLICATE ORDER Patient Instructions: Dr. Neal Councilman 1month, CBC and BMP next lab day, facility MD this week. Activity: as tolerated. Diet: DIET MECHANICAL SOFT Therapy Ordered: No therapy plan of the specified type found. Follow-up appointments: Follow-up Appointments Procedures  FOLLOW UP VISIT Appointment in: 3 - [address] MD  
  Standing Status:   Standing Number of Occurrences:   1 Order Specific Question:   Appointment in Answer:   3 - 5 Days Time Spent on Discharge greater than 30 minutes.  
 
Valencia Torres NP 
3/9/2021 9:18 AM

## 2021-03-09 NOTE — PROGRESS NOTES
03/08/21 1911 NIH Stroke Scale Interval Other (comment) (dual with Mateusz) LOC 1 LOC Questions 0 LOC Commands 0 Best Gaze 0 Visual 0 Facial Palsy 1 Motor Right Arm 0 Motor Left Arm 0 Motor Right Leg 1 Motor Left Leg 1 Limb Ataxia 0 Sensory 0 Best Language 0 Dysarthria 0 Extinction and Inattention 0 Total 4

## 2021-03-09 NOTE — PROGRESS NOTES
03/09/21 1226 NIH Stroke Scale Interval Other (comment) (pt returned from radiation) LOC 1 LOC Questions 1 LOC Commands 1 Best Gaze 0 Visual 0 Facial Palsy 1 Motor Right Arm 1 Motor Left Arm 1 Motor Right Leg 1 Motor Left Leg 1 Limb Ataxia 0 Sensory 0 Best Language 0 Dysarthria 0 Extinction and Inattention 0 Total 8  
pt returned from radiation. Vitals stable. Hospitalist made aware

## 2021-03-09 NOTE — PROGRESS NOTES
Problem: Pressure Injury - Risk of 
Goal: *Prevention of pressure injury Description: Document Antelmo Scale and appropriate interventions in the flowsheet. Outcome: Progressing Towards Goal 
Note: Pressure Injury Interventions: 
Sensory Interventions: Assess changes in LOC, Check visual cues for pain, Discuss PT/OT consult with provider, Float heels, Keep linens dry and wrinkle-free, Maintain/enhance activity level, Minimize linen layers, Pressure redistribution bed/mattress (bed type) Moisture Interventions: Absorbent underpads, Check for incontinence Q2 hours and as needed, Limit adult briefs, Minimize layers Activity Interventions: Increase time out of bed, Pressure redistribution bed/mattress(bed type), PT/OT evaluation Mobility Interventions: HOB 30 degrees or less, Pressure redistribution bed/mattress (bed type), PT/OT evaluation Nutrition Interventions: Document food/fluid/supplement intake Friction and Shear Interventions: Feet elevated on foot rest, HOB 30 degrees or less, Lift sheet, Minimize layers Problem: Patient Education: Go to Patient Education Activity Goal: Patient/Family Education Outcome: Progressing Towards Goal 
  
Problem: Falls - Risk of 
Goal: *Absence of Falls Description: Document Gonzalez Cease Fall Risk and appropriate interventions in the flowsheet. Outcome: Progressing Towards Goal 
Note: Fall Risk Interventions: 
Mobility Interventions: Bed/chair exit alarm, Communicate number of staff needed for ambulation/transfer, OT consult for ADLs, Patient to call before getting OOB, PT Consult for mobility concerns Mentation Interventions: Adequate sleep, hydration, pain control, Bed/chair exit alarm, Door open when patient unattended, Increase mobility, More frequent rounding, Reorient patient Medication Interventions: Bed/chair exit alarm, Patient to call before getting OOB, Teach patient to arise slowly Elimination Interventions: Bed/chair exit alarm, Call light in reach, Patient to call for help with toileting needs, Stay With Me (per policy), Toilet paper/wipes in reach, Toileting schedule/hourly rounds Problem: Patient Education: Go to Patient Education Activity Goal: Patient/Family Education Outcome: Progressing Towards Goal 
  
Problem: Diabetes Self-Management Goal: *Disease process and treatment process Description: Define diabetes and identify own type of diabetes; list 3 options for treating diabetes. Outcome: Progressing Towards Goal 
Goal: *Incorporating nutritional management into lifestyle Description: Describe effect of type, amount and timing of food on blood glucose; list 3 methods for planning meals. Outcome: Progressing Towards Goal 
Goal: *Incorporating physical activity into lifestyle Description: State effect of exercise on blood glucose levels. Outcome: Progressing Towards Goal 
Goal: *Developing strategies to promote health/change behavior Description: Define the ABC's of diabetes; identify appropriate screenings, schedule and personal plan for screenings. Outcome: Progressing Towards Goal 
Goal: *Using medications safely Description: State effect of diabetes medications on diabetes; name diabetes medication taking, action and side effects. Outcome: Progressing Towards Goal 
Goal: *Monitoring blood glucose, interpreting and using results Description: Identify recommended blood glucose targets  and personal targets. Outcome: Progressing Towards Goal 
Goal: *Prevention, detection, treatment of acute complications Description: List symptoms of hyper- and hypoglycemia; describe how to treat low blood sugar and actions for lowering  high blood glucose level. Outcome: Progressing Towards Goal 
Goal: *Prevention, detection and treatment of chronic complications Description: Define the natural course of diabetes and describe the relationship of blood glucose levels to long term complications of diabetes. Outcome: Progressing Towards Goal 
Goal: *Developing strategies to address psychosocial issues Description: Describe feelings about living with diabetes; identify support needed and support network Outcome: Progressing Towards Goal 
Goal: *Insulin pump training Outcome: Progressing Towards Goal 
Goal: *Sick day guidelines Outcome: Progressing Towards Goal 
Goal: *Patient Specific Goal (EDIT GOAL, INSERT TEXT) Outcome: Progressing Towards Goal 
  
Problem: Patient Education: Go to Patient Education Activity Goal: Patient/Family Education Outcome: Progressing Towards Goal 
  
Problem: Patient Education: Go to Patient Education Activity Goal: Patient/Family Education Outcome: Progressing Towards Goal 
  
Problem: TIA/CVA Stroke: Day 2 Until Discharge Goal: Off Pathway (Use only if patient is Off Pathway) Outcome: Progressing Towards Goal 
Goal: Activity/Safety Outcome: Progressing Towards Goal 
Goal: Diagnostic Test/Procedures Outcome: Progressing Towards Goal 
Goal: Nutrition/Diet Outcome: Progressing Towards Goal 
Goal: Discharge Planning Outcome: Progressing Towards Goal 
Goal: Medications Outcome: Progressing Towards Goal 
Goal: Respiratory Outcome: Progressing Towards Goal 
Goal: Treatments/Interventions/Procedures Outcome: Progressing Towards Goal 
Goal: Psychosocial 
Outcome: Progressing Towards Goal 
Goal: *Verbalizes anxiety and depression are reduced or absent Outcome: Progressing Towards Goal 
Goal: *Absence of aspiration Outcome: Progressing Towards Goal 
Goal: *Absence of deep venous thrombosis signs and symptoms(Stroke Metric) Outcome: Progressing Towards Goal 
Goal: *Optimal pain control at patient's stated goal 
Outcome: Progressing Towards Goal 
Goal: *Tolerating diet Outcome: Progressing Towards Goal 
 Goal: *Ability to perform ADLs and demonstrates progressive mobility and function Outcome: Progressing Towards Goal 
Goal: *Stroke education continued(Stroke Metric) Outcome: Progressing Towards Goal 
  
Problem: Patient Education: Go to Patient Education Activity Goal: Patient/Family Education Outcome: Progressing Towards Goal 
  
Problem: Patient Education: Go to Patient Education Activity Goal: Patient/Family Education Outcome: Progressing Towards Goal 
  
Problem: Patient Education: Go to Patient Education Activity Goal: Patient/Family Education Outcome: Progressing Towards Goal

## 2021-03-09 NOTE — PROGRESS NOTES
LTG: Patient will tolerate least restrictive diet without overt signs or symptoms of airway compromise. STG: Patient will participate in modified barium swallow study as clinically indicated.  MET 3/5/21 STG: Patient will tolerate chopped mechanical soft diet and thin liquids by cup without overt s/sx airway compromise. Added 3/5/21. SPEECH LANGUAGE PATHOLOGY: DYSPHAGIA- Initial Assessment NAME/AGE/GENDER: Mau Murphy is a 68 y.o. male DATE: 3/9/2021 PRIMARY DIAGNOSIS: Acute ischemic stroke ([facility] [I63.9] ICD-10: Treatment Diagnosis: R13.12 Dysphagia, Oropharyngeal Phase RECOMMENDATIONS  
DIET:  
? Mechanical Soft- chopped meats & vegetables ? Thin Liquids (Single sips) (No straws) ? Can have 2-3 ice chips per hour with supervision from nursing staff MEDICATIONS: floated or crushed in puree ASPIRATION PRECAUTIONS · Fully awake/alert · Upright for all PO 
· 1:1 assistance with all PO 
· Small bites and sips · Cup/sip · No straws COMPENSATORY STRATEGIES/MODIFICATIONS · None RECOMMENDATIONS for CONTINUED SPEECH THERAPY:  
YES: Anticipate need for ongoing speech therapy during this hospitalization. ASSESSMENT Patient with moderate oral and mild pharyngeal dysphagia per modified barium swallow study completed 3/5/21. More drowsy today, but stating he is thirsty. Appears to continue to piecemeal swallow thin liquids and puree. Single occurrence of weak cough post swallow with thin by cup. Otherwise no overt s/sx airway compromise with thin by single cups sips, puree, or mechanical soft trials. Limited assessment of diet tolerance this date as patient accepted minimal trials before declining further intake. RN aware of patient being more drowsy/lethargic this date.   
 
EDUCATION: 
· Recommendations discussed with Patient and RN 
CONTINUATION OF SKILLED SERVICES/MEDICAL NECESSITY: 
 ? Patient is expected to demonstrate progress in  swallow strength, swallow timeliness, swallow function, diet tolerance and swallow safety in order to  improve swallow safety, work toward diet advancement and decrease aspiration risk. ? Patient continues to require skilled intervention due to dysphagia. REHABILITATION POTENTIAL FOR STATED GOALS: Good PLAN   
FREQUENCY/DURATION: Continue to follow patient 3 times a week for duration of hospital stay to address above goals. Next treatment session will address diet tolerance - Recommendations for next treatment session: Next treatment session will address diet tolerance SUBJECTIVE Patient upright in bed for assessment. Drowsy, but responding to basic questions. Kept eyes closed throughout session. Oxygen Device: room air Pain: Pain Scale 1: Numeric (0 - 10) Pain Intensity 1: 0 History of Present Injury/Illness: Mr. 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 He also has no past medical history of Arrhythmia, Asthma, Autoimmune disease ([address] CAD (coronary artery disease), Chronic kidney disease, Coagulation defects, COPD, Diabetes ([address] Difficult intubation, GERD (gastroesophageal reflux disease), Heart failure ([address] Liver disease, Malignant hyperthermia due to anesthesia, Nausea & vomiting, Pseudocholinesterase deficiency, Psychiatric disorder, PUD (peptic ulcer disease), Seizures ([address] or Thromboembolus ([address] . He also  has a past surgical history that includes hx other surgical; hx colonoscopy; hx heent; hx hernia repair (2015); hx orthopaedic (Left); and ir insert tunl cvc w port over 5 years (1/7/2021). PRECAUTIONS/ALLERGIES: Patient has no known allergies. Problem List:  (Impairments causing functional limitations): 1. Oropharyngeal dysphagia Orientation: 
Person Place OBJECTIVE Dysphagia treatment: 
 Patient seen for diet tolerance. Consumed trials of thin by single cup sips, puree, and mechanical soft consistency. Persistent mild oral holding and piecemeal swallows with thin liquids and puree. Weak cough post swallow on 1 out of 6 trials thin by single cup sips. No overt s/sx airway compromise with puree across 4 trials or mechanical soft consistency across 2 trials. Prolonged, but functional oral prep with mech soft (cracker softened in pudding) and adequate oral clearance post swallow. Patient declined further po trials. Tool Used: Dysphagia Outcome and Severity Scale (GINA) Score Comments Normal Diet  [] 7 With no strategies or extra time needed Functional Swallow  [] 6 May have mild oral or pharyngeal delay Mild Dysphagia  [] 5 Which may require one diet consistency restricted Mild-Moderate Dysphagia  [] 4 With 1-2 diet consistencies restricted Moderate Dysphagia  [] 3 With 2 or more diet consistencies restricted Moderate-Severe Dysphagia  [x] 2 With partial PO strategies (trials with ST only) Severe Dysphagia  [] 1 With inability to tolerate any PO safely Score:  Initial: 2 Most Recent: 4 (Date 03/09/21 ) Interpretation of Tool: The Dysphagia Outcome and Severity Scale (GINA) is a simple, easy-to-use, 7-point scale developed to systematically rate the functional severity of dysphagia based on objective assessment and make recommendations for diet level, independence level, and type of nutrition. Current Medications: No current facility-administered medications on file prior to encounter. Current Outpatient Medications on File Prior to Encounter Medication Sig Dispense Refill  dexAMETHasone (DECADRON) 4 mg tablet One tab three times a day 90 Tab 0  
  folic acid (FOLVITE) 1 mg tablet Take 1 Tab by mouth daily. 30 Tab 5  predniSONE (DELTASONE) 10 mg tablet Take 90 mg by mouth daily (with breakfast). 90 Tab 0  prochlorperazine (Compazine) 10 mg tablet Take 1 Tab by mouth every six (6) hours as needed for Nausea or Vomiting for up to 30 days. Indications: nausea and vomiting caused by cancer drugs 90 Tab 3  
 ondansetron (ZOFRAN ODT) 8 mg disintegrating tablet Take 1 Tab by mouth every eight (8) hours as needed for Nausea or Vomiting. Indications: prevent nausea and vomiting from cancer chemotherapy 90 Tab 3  
 lidocaine-prilocaine (EMLA) topical cream Apply a dab to port site 30-45 min prior to labs/infusion appt. Cover with saran wrap 30 g 2  
 HYDROcodone-acetaminophen (NORCO) 5-325 mg per tablet Take 1 Tab by mouth every six (6) hours as needed for Pain for up to 30 days. Max Daily Amount: 4 Tabs. 30 Tab 0  
 furosemide (LASIX) 20 mg tablet Take 1 Tab by mouth daily. Indications: accumulation of fluid resulting from chronic heart failure 90 Tab 1  
 amLODIPine (NORVASC) 5 mg tablet Take 5 mg by mouth daily.  cyanocobalamin (VITAMIN B-12) 1,000 mcg sublingual tablet Take 1,000 mcg by mouth every morning.  POTASSIUM (POTASSIMIN PO) Take  by mouth every morning.  Hydrochlorothiazide (HYDRODIURIL) 12.5 mg tablet Take 12.5 mg by mouth every morning.  Cholecalciferol, Vitamin D3, (VITAMIN D3) 1,000 unit cap Take  by mouth every morning.  Thyroid, Pork, (ARMOUR THYROID) 120 mg Tab Take 150 mg by mouth daily. Takes 1 (120 mg) + 1 (30 mg) tab daily INTERDISCIPLINARY COLLABORATION: RN After treatment position/precautions: · Upright in bed · RN notified Total Treatment Duration:  
Time In: 5229 Time Out: 5703 Julio Jo MS, CCC-SLP

## 2021-03-09 NOTE — PROGRESS NOTES
Pt is medically cleared for dc today and will transfer to [facility] for [employer]. Transport scheduled for 1500 through [employer]. Pt's spouse is at the bedside and aware of pt's dc and transport time. SW remains available to assist as needed. Care Management Interventions PCP Verified by CM: Yes Mode of Transport at Discharge: ([employer]) Transition of Care Consult (CM Consult): SNF Partner SNF: Yes Discharge Durable Medical Equipment: No 
Physical Therapy Consult: Yes Occupational Therapy Consult: Yes Speech Therapy Consult: Yes Current Support Network: Own Home, Lives with Spouse Confirm Follow Up Transport: Family The Plan for Transition of Care is Related to the Following Treatment Goals : STR to improve pt's strength and functional abilities for a safe transition to home. The Patient and/or Patient Representative was Provided with a Choice of Provider and Agrees with the Discharge Plan?: Yes Name of the Patient Representative Who was Provided with a Choice of Provider and Agrees with the Discharge Plan: Jose L Estrada/spouse Freedom of Choice List was Provided with Basic Dialogue that Supports the Patient's Individualized Plan of Care/Goals, Treatment Preferences and Shares the Quality Data Associated with the Providers?: Yes Discharge Location Discharge Placement: Rehab Unit Subacute([facility])

## 2021-03-09 NOTE — PROGRESS NOTES
TRANSFER - OUT REPORT: 
 
Verbal report given to Maria C(name) on Leslie Masker  being transferred to [facility](unit) for routine progression of care Report consisted of patients Situation, Background, Assessment and  
Recommendations(SBAR). Information from the following report(s) SBAR was reviewed with the receiving nurse. Lines:  
Venous Access Device 01/07/21 Upper chest (subclavicular area), left (Active) Central Line Being Utilized Yes 03/08/21 2001 Criteria for Appropriate Use Limited/no vessel suitable for conventional peripheral access 03/08/21 2001 Site Assessment Clean, dry, & intact 03/09/21 0739 Dressing Status Clean, dry, & intact 03/09/21 0739 Dressing Type Disk with Chlorhexadine gluconate (CHG); Transparent 03/08/21 2001 Action Taken Blood drawn 03/05/21 0418 Date Accessed (Medial Site) 03/05/21 03/07/21 2963 Action Taken (Medial Site) Flushed 03/08/21 1636 Positive Blood Return (Lateral Site) Yes 03/05/21 0418 Opportunity for questions and clarification was provided.

## 2021-03-09 NOTE — PROGRESS NOTES
03/09/21 4096 NIH Stroke Scale Interval Other (comment) LOC 1 LOC Questions 0 LOC Commands 0 Best Gaze 0 Visual 0 Facial Palsy 1 Motor Right Arm 0 Motor Left Arm 0 Motor Right Leg 1 Motor Left Leg 1 Limb Ataxia 0 Sensory 0 Best Language 0 Dysarthria 0 Extinction and Inattention 0 Total 4  
dual nih with noreen MÁQRUEZ

## 2021-03-10 NOTE — PROGRESS NOTES
Called wife of patient to notify of next appt 1 month away as requested. She stated she was writing down time and date at 4-6-21 at 1:15pm with Dr Julia Graham as requested at discharge. Pt is being discharged to [facility].

## 2021-03-12 PROBLEM — N17.9 AKI (ACUTE KIDNEY INJURY) (HCC): Status: ACTIVE | Noted: 2021-01-01

## 2021-03-12 PROBLEM — E03.9 ACQUIRED HYPOTHYROIDISM: Status: ACTIVE | Noted: 2021-01-01

## 2021-03-12 PROBLEM — E44.1 MILD PROTEIN-CALORIE MALNUTRITION (HCC): Chronic | Status: ACTIVE | Noted: 2021-01-01

## 2021-03-12 PROBLEM — J96.00 ACUTE RESPIRATORY FAILURE (HCC): Status: ACTIVE | Noted: 2021-01-01

## 2021-03-12 PROBLEM — E87.0 HYPERNATREMIA: Status: ACTIVE | Noted: 2021-01-01

## 2021-03-12 PROBLEM — J18.9 HCAP (HEALTHCARE-ASSOCIATED PNEUMONIA): Status: ACTIVE | Noted: 2021-01-01

## 2021-03-12 PROBLEM — I10 HTN (HYPERTENSION): Status: ACTIVE | Noted: 2021-01-01

## 2021-03-12 PROBLEM — A41.9 SEPSIS (HCC): Status: ACTIVE | Noted: 2021-01-01

## 2021-03-12 NOTE — PROGRESS NOTES
vancomycin Consult MD ordering: Jm GILMORE following? no Indication: HAP 
DOT:  7  days Goal level(s): 15-20 Ht Readings from Last 1 Encounters:  
21 6' (1.829 m) Wt Readings from Last 1 Encounters:  
21 93 kg (205 lb) Temp (24hrs), Av.6 °F (37 °C), Min:98.4 °F (36.9 °C), Max:98.8 °F (37.1 °C) Dosing weight: 93 kg 
68 y.o. Date:  Dose/Freq Admin Times Level/Time:  
3/12 2g x 1 1410   
3/13 Intermittent dosing  Random (13) Recent Labs  
  21 
1119 * CREA 2.90* WBC 16.2*  
LAC 1.3 Estimated Creatinine Clearance: 23 mL/min (A) (based on SCr of 2.9 mg/dL (H)). No results found for: Ludivina Santiago Day 1 Assessment and Plan: Will continue to dose based on random levels. Random is ordered for tomorrow at 1300. Earl Frederick, PharmD, BCPS

## 2021-03-12 NOTE — H&P
Hospitalist Admission History and Physical     NAME:  Kayode Davis   Age:  68 y.o.  :   1940   MRN:   [id number]  PCP: Isai Flores MD  Consulting MD:  Treatment Team: Attending Provider: Glenys Emery DO; Primary Nurse: Jett Doty RN    Chief Complaint   Patient presents with    Shortness of Breath         HPI:   Patient is a 68 y.o. male who presented to the ED for cc respiratory failure requiring intubation with EMS. 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 Wife is at bedside who states he has not been eating or drinking well for weeks and having difficulty with swallowing. 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     HX OTHER SURGICAL      thyroid removed (1)    IR INSERT TUNL CVC W PORT OVER 5 YEARS  2021        Family History   Problem Relation Age of Onset    Heart Disease Brother 48        MI    Alzheimer Mother     Heart Disease Brother     Pacemaker Brother     Diabetes Sister     Alzheimer Sister     Alcohol abuse Brother     Alzheimer Brother     Diabetes Brother     Alcohol abuse Brother     Alzheimer Brother        Social History     Social History Narrative    Not on file        Social History     Tobacco Use    Smoking status: Former Smoker     Packs/day: 1.50     Years: 30.00     Pack years: 45.00     Quit date: 1986     Years since quittin.2    Smokeless tobacco: Former User    Tobacco comment: quit    Substance Use Topics    Alcohol use: Yes     Alcohol/week: 1.0 standard drinks     Types: 1 Glasses of wine per week     Comment: daily        Social History     Substance and Sexual Activity   Drug Use No         No Known Allergies    Prior to Admission medications    Medication Sig Start Date End Date Taking? Authorizing Provider   dexAMETHasone (DECADRON) 4 mg tablet One tab three times a day 3/1/21   Sylvain Olivo MD   folic acid (FOLVITE) 1 mg tablet Take 1 Tab by mouth daily. 21   Negro Neal NP   prochlorperazine (Compazine) 10 mg tablet Take 1 Tab by mouth every six (6) hours as needed for Nausea or Vomiting for up to 30 days. Indications: nausea and vomiting caused by cancer drugs 21  Sylvain Olivo MD   ondansetron [facility] ODT) 8 mg disintegrating tablet Take 1 Tab by mouth every eight (8) hours as needed for Nausea or Vomiting. Indications: prevent nausea and vomiting from cancer chemotherapy 21   Sylvain Olivo MD   furosemide (LASIX) 20 mg tablet Take 1 Tab by mouth daily. Indications: accumulation of fluid resulting from chronic heart failure 20   Sylvain Olivo MD   amLODIPine (NORVASC) 5 mg tablet Take 5 mg by mouth daily. Provider, Historical   cyanocobalamin (VITAMIN B-12) 1,000 mcg sublingual tablet Take 1,000 mcg by mouth every morning. Provider, Historical   Cholecalciferol, Vitamin D3, (VITAMIN D3) 1,000 unit cap Take  by mouth every morning.     Provider, Historical Thyroid, Pork, (ARMOUR THYROID) 120 mg Tab Take 150 mg by mouth daily. Takes 1 (120 mg) + 1 (30 mg) tab daily    Provider, Historical           Review of Systems    Cannot obtain due to being intubated. Objective:     Visit Vitals  /64   Pulse 90   Temp 98.8 °F (37.1 °C)   Resp 18 Comment: Simultaneous filing. User may not have seen previous data. Ht 6' (1.829 m)   Wt 93 kg (205 lb)   SpO2 95%   BMI 27.80 kg/m²        No intake/output data recorded. No intake/output data recorded. Data Review:   Recent Results (from the past 24 hour(s))   CBC WITH AUTOMATED DIFF    Collection Time: 03/12/21 11:19 AM   Result Value Ref Range    WBC 16.2 (H) 4.3 - 11.1 K/uL    RBC 3.71 (L) 4.23 - 5.6 M/uL    HGB 10.9 (L) 13.6 - 17.2 g/dL    HCT 35.4 (L) 41.1 - 50.3 %    MCV 95.4 79.6 - 97.8 FL    MCH 29.4 26.1 - 32.9 PG    MCHC 30.8 (L) 31.4 - 35.0 g/dL    RDW 20.8 (H) 11.9 - 14.6 %    PLATELET 468 (L) 298 - 450 K/uL    MPV 11.3 9.4 - 12.3 FL    ABSOLUTE NRBC 0.00 0.0 - 0.2 K/uL    DF AUTOMATED      NEUTROPHILS 93 (H) 43 - 78 %    LYMPHOCYTES 2 (L) 13 - 44 %    MONOCYTES 3 (L) 4.0 - 12.0 %    EOSINOPHILS 0 (L) 0.5 - 7.8 %    BASOPHILS 0 0.0 - 2.0 %    IMMATURE GRANULOCYTES 2 0.0 - 5.0 %    ABS. NEUTROPHILS 15.0 (H) 1.7 - 8.2 K/UL    ABS. LYMPHOCYTES 0.3 (L) 0.5 - 4.6 K/UL    ABS. MONOCYTES 0.5 0.1 - 1.3 K/UL    ABS. EOSINOPHILS 0.0 0.0 - 0.8 K/UL    ABS. BASOPHILS 0.0 0.0 - 0.2 K/UL    ABS. IMM.  GRANS. 0.3 0.0 - 0.5 K/UL   LACTIC ACID    Collection Time: 03/12/21 11:19 AM   Result Value Ref Range    Lactic acid 1.3 0.4 - 2.0 MMOL/L   METABOLIC PANEL, COMPREHENSIVE    Collection Time: 03/12/21 11:19 AM   Result Value Ref Range    Sodium 159 (H) 136 - 145 mmol/L    Potassium 5.0 3.5 - 5.1 mmol/L    Chloride 127 (H) 98 - 107 mmol/L    CO2 27 21 - 32 mmol/L    Anion gap 5 (L) 7 - 16 mmol/L    Glucose 218 (H) 65 - 100 mg/dL     (H) 8 - 23 MG/DL    Creatinine 2.90 (H) 0.8 - 1.5 MG/DL    GFR est AA 27 (L) >60 ml/min/1.73m2    GFR est non-AA 22 (L) >60 ml/min/1.73m2    Calcium 8.9 8.3 - 10.4 MG/DL    Bilirubin, total 0.6 0.2 - 1.1 MG/DL    ALT (SGPT) 54 12 - 65 U/L    AST (SGOT) 14 (L) 15 - 37 U/L    Alk. phosphatase 119 50 - 136 U/L    Protein, total 5.6 (L) 6.3 - 8.2 g/dL    Albumin 2.0 (L) 3.2 - 4.6 g/dL    Globulin 3.6 (H) 2.3 - 3.5 g/dL    A-G Ratio 0.6 (L) 1.2 - 3.5     TROPONIN-HIGH SENSITIVITY    Collection Time: 03/12/21 11:19 AM   Result Value Ref Range    Troponin-High Sensitivity 162.6 (HH) 0 - 14 pg/mL   EKG, 12 LEAD, INITIAL    Collection Time: 03/12/21 11:33 AM   Result Value Ref Range    Ventricular Rate 99 BPM    Atrial Rate 99 BPM    P-R Interval 328 ms    QRS Duration 104 ms    Q-T Interval 422 ms    QTC Calculation (Bezet) 541 ms    Calculated P Axis 46 degrees    Calculated R Axis 84 degrees    Calculated T Axis 33 degrees    Diagnosis       !! AGE AND GENDER SPECIFIC ECG ANALYSIS !! Sinus rhythm with 1st degree A-V block  Incomplete right bundle branch block  Prolonged QT  Abnormal ECG  When compared with ECG of 04-MAR-2021 12:06,  Nonspecific T wave abnormality now evident in Anterior leads     POC G3    Collection Time: 03/12/21 11:45 AM   Result Value Ref Range    Device: VENT      FIO2 (POC) 60 %    pH (POC) 7.40 7.35 - 7.45      pCO2 (POC) 37.2 35 - 45 MMHG    pO2 (POC) 112 (H) 75 - 100 MMHG    HCO3 (POC) 23.2 22 - 26 MMOL/L    sO2 (POC) 98 95 - 98 %    Base deficit (POC) 1 mmol/L    Mode ASSIST CONTROL      Tidal volume 450 ml    Set Rate 16 bpm    PEEP/CPAP (POC) 8 cmH2O    Allens test (POC) YES      Inspiratory Time 1 sec    Site RIGHT RADIAL      Specimen type (POC) ARTERIAL      Performed by Lulu     CO2, POC 24 MMOL/L    Critical value read back 00:01     COLLECT TIME 1,140         Physical Exam:     General:  Intubated. No obvious pain. Eyes:  Conjunctivae/corneas clear.  Reactive to light, left eye slower to constrict compared to right    Ears:  Normal TMs and external ear canals both ears. Nose: Nares normal. Septum midline. Mouth/Throat: intubated. Neck:  no JVD. Back:   deferred   Lungs:   Clear to auscultation bilaterally. Heart:  Regular rate and rhythm, S1, S2 normal   Abdomen:   Soft, non-tender. Bowel sounds normal.   Extremities: Chronic 1+ edema to LE bilaterally    Pulses: 2+ and symmetric all extremities. Skin: Skin color, texture, turgor normal. No rashes or lesions   Lymph nodes: Cervical, supraclavicular, and axillary nodes normal.   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 sepsis bolus. Met by HR and WBC. UA pending. LLL HCAP - Vancomycin/Zosyn. Order sputum culture. Currently ventilated and want light sedation. Possible extubation tomorrow. Consult pulmonology to see in AM. Light sedation today. Currently on Propofol and will add fentyl for analgesic concerns. KEI - Pre renal. Suspecting from hypoxia and volume depletion. IV fluids. Strict Is and Os. Hypernatremia - Start D5, trend Na levels. Do not correct more than 6 units in 24 hours to avoid edema. Poor PO intake with difficulty swallowing - Once more awake, consult speech therapy. Adenocarcinoma of right lung - Continue steroids. Follows oncology outpatient. Currently on chemo/radiation. Elevated troponin - Likely type II since hypoxic with respiratory failure. Trend troponin. Order EKG. DVT prophylaxis - heparin    Discussed case with wife. She would like  to be DNR. Does not want him to suffer.      Signed By: Eulalia Baptiste, DO   March 12, 2021

## 2021-03-12 NOTE — ED PROVIDER NOTES
Patient is a 81-year-old male presenting to the emergency department by EMS secondary to sepsis with acute respiratory failure intubated from rehab. Patient was admitted to our hospital on 3/4/2021 as a code stroke with leukocytosis and altered mental status with facial drooping. He was discharged on 3/9/2021 to the rehab facility. I saw this patient at the time of his initial admission and his family states that he was walking with out assistance prior to his TIA. The patient was alert and communicative when I saw him in the emergency department last week. EMS said that when they picked him up he had a respiratory rate of 40 and was nonverbal.  In route he became unresponsive and his respiratory rate dropped significantly. He said his oxygen saturation was never lower than 90. At that point they intubated the patient with RSI. Past Medical History:  
Diagnosis Date  Acute ischemic stroke ([facility] 3/4/2021  Hypercholesterolemia  Hypertension   
 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   
 partial thyroid Past Surgical History:  
Procedure Laterality Date  HX COLONOSCOPY    
 HX HEENT    
 part of thyroid removed  HX HERNIA REPAIR  0021  
 umbilical  
 HX ORTHOPAEDIC Left   
 bunion removal on left great toe.  HX OTHER SURGICAL    
 thyroid removed (1)  IR INSERT TUNL CVC W PORT OVER 5 YEARS  1/7/2021 Family History:  
Problem Relation Age of Onset  Heart Disease Brother 48 MI  
 Alzheimer Mother  Heart Disease Brother  Pacemaker Brother  Diabetes Sister  Alzheimer Sister  Alcohol abuse Brother  Alzheimer Brother  Diabetes Brother  Alcohol abuse Brother  Alzheimer Brother Social History Socioeconomic History  Marital status:    Spouse name: Not on file  Number of children: Not on file  Years of education: Not on file  Highest education level: Not on file Occupational History  Not on file Social Needs  Financial resource strain: Not on file  Food insecurity Worry: Not on file Inability: Not on file  Transportation needs Medical: Not on file Non-medical: Not on file Tobacco Use  Smoking status: Former Smoker Packs/day: 1.50 Years: 30.00 Pack years: 45.00 Quit date: 1986 Years since quittin.2  Smokeless tobacco: Former User  Tobacco comment: quit  Substance and Sexual Activity  Alcohol use: Yes Alcohol/week: 1.0 standard drinks Types: 1 Glasses of wine per week Comment: daily  Drug use: No  
 Sexual activity: Not on file Lifestyle  Physical activity Days per week: Not on file Minutes per session: Not on file  Stress: Not on file Relationships  Social connections Talks on phone: Not on file Gets together: Not on file Attends Sabianism service: Not on file Active member of club or organization: Not on file Attends meetings of clubs or organizations: Not on file Relationship status: Not on file  Intimate partner violence Fear of current or ex partner: Not on file Emotionally abused: Not on file Physically abused: Not on file Forced sexual activity: Not on file Other Topics Concern  Not on file Social History Narrative  Not on file ALLERGIES: Patient has no known allergies. Review of Systems Unable to perform ROS: Intubated Respiratory: Positive for shortness of breath. Vitals:  
 21 1153 21 1300 21 1503 21 2148 BP: 137/82 131/81 (!) 143/83 (!) 153/83 Pulse: 73 80 75 71 Resp:  Temp: 97.2 °F (36.2 °C)  97.7 °F (36.5 °C) 98 °F (36.7 °C) SpO2: 95% 95% 97% 97% Weight:      
Height:      
      
 
Physical Exam  
 GENERAL:The patient has Body mass index is 27.12 kg/m². Well-hydrated. VITAL SIGNS: Heart rate, blood pressure, respiratory rate reviewed as recorded in 
nurse's notes HEAD: negative herndon and raccoon sign EYES: Pupils minimally reactive. No extraocular motion intact. No conjunctival redness or drainage. NOSE: No nasal drainage or epistaxis. No septal hematoma noted MOUTH/THROAT: ET tube in place NECK: Supple, no meningeal signs. Trachea midline. No masses or thyromegaly. LUNGS: Diminished breath sounds on the right. The patient has rhonchorous breath sounds on the left. No additional respirations other than what is being given by EMS. CHEST: No deformity, no crepitus. CARDIOVASCULAR: Regular rate and rhythm ABDOMEN: Soft without tenderness. No palpable masses or organomegaly. No 
peritoneal signs. No rigidity. EXTREMITIES: No clubbing or cyanosis. No joint swelling. Normal muscle tone. No 
restricted range of motion appreciated. NEUROLOGIC: Sensation is grossly intact. Cranial nerve exam reveals face is 
symmetrical, tongue is midline speech is clear. SKIN: No rash or petechiae. Good skin turgor palpated. PSYCHIATRIC: Unresponsive secondary to recent RSI 
 
 
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, unspecified laterality ([address] Severe sepsis ([address] Diagnosis management comments: Sepsis, pneumonia, lung cancer, electrolyte abnormality, renal failure, Amount and/or Complexity of Data Reviewed Clinical lab tests: ordered and reviewed Tests in the radiology section of CPT®: reviewed and ordered Tests in the medicine section of CPT®: reviewed and ordered Decide to obtain previous medical records or to obtain history from someone other than the patient: yes Obtain history from someone other than the patient: yes Review and summarize past medical records: yes Discuss the patient with other providers: yes Independent visualization of images, tracings, or specimens: yes ED Course as of Mar 17 2211 Fri Mar 12, 2021  
1123 I spoke to the patient's wife who tells me that he has been food packing. The patient got and transition back to liquid diet because of this. She says that they did an x-ray and there was concern for pneumonia. Libra Shin 1127 ET tube was deep down in the right mainstem. This was retracted 2 cm and secured in place. Libra Shin 1155 WBC(!): 16.2 [] 1155 IMPRESSION 
  
1. Endotracheal tube tip at bridgette and should be pulled back to ensure optimal 
functioning. 2. Developing mild hazy infiltrate at left lung base with otherwise stable 
exam.. XR CHEST PORT [KH] ED Course User Index [KH] Jonna Kawasaki, DO  
 
 
Critical Care Performed by: Jonna Kawasaki, DO Authorized by: Jonna Kawasaki, DO Comments:  
   Critical care time: 97 minutes of critical care time was performed in the emergency department. This was separate from any other procedures listed during the patients emergency department course. The failure to initiate these interventions on an urgent basis would likely have resulted in sudden, clinically significant or life-threatening deterioration in the patients condition.

## 2021-03-12 NOTE — ED TRIAGE NOTES
Pt to ER with EMS from [facility] due to abnormal labs. When EMS arrived pt was breathing 40 times per min and not able to speak in full sentences due to SOB. EMS was diverted from [address] [place] [address] due to pt having cancer. Pt's RR decreased and he became lethargic in route so EMS intubated him due to him having a full code status. RT and Dr. Marcelina Mac at bedside at this time.

## 2021-03-12 NOTE — CONSULTS
CONSULT NOTE Torrey Marcos 3/12/2021 Date of Admission:  3/12/2021 The patient's chart is reviewed and the patient is discussed with the staff. Subjective:  
 
Patient is a 68 y.o. male seen and evaluated at the request of Dr. Heavelny Nugent. He has a PMH of HTN, hypothyroidism, former ETOH and Stage IV adenocarcinoma of the lung. He is a former smoker, quit in 1987. He smoked for 25 years 1.5ppd. He is known to our office after a CT of the chest revealed a R paratracheal mass and R pleural effusion. A thoracentesis was done 12/2020 and revealed malignant cells. A PET scan showed primary R suprahilar and perihilar bronchogenic carcinoma as well as a R adrenal mass and FDG avid mediastinal and paraesophageal metastatic lymphadenopathy. He underwent an addition thoracentesis on 1/7/21 with 2L removed on R side. Adrenal biopsy and additional thoracentesis with 450cc removed on R side on 1/13. He began chemotherapy in January. An MRI was completed and showed extension of a R paraspinal lesion at T5-T6 with extension into spinal canal through neuroforamen. He then began XRT. He recently had an admission on 3/4-3/9 concerning for CVA, CT head and MRI was negative for acute stroke, hemorrhage or metastasis. Per chart review EMS was called to rehab facility due to patient being tachypneic and unable to speak in full sentences due to SOB. While in route to ER he became lethargic and had decreased respirations and was intubated by EMS. The wife stated the patient has had difficulty swallowing and has not been eating or drinking well for weeks. WBC 16.2 with L shift, Cr elevated at 2.9, Na 159, trop 162.6. ABG with pH 7.4, PCO2 37.2 PO2 112. EKG SR with 1st AVB. SPutum culture and blood cultures obtained in ER. Review of Systems Review of systems not obtained due to patient factors. Patient Active Problem List  
Diagnosis Code  Pleural effusion J90  
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  
 Non-small cell lung cancer ([facility] C34.90  Acute ischemic stroke (HCC) I63.9  Acute respiratory failure (HCC) J96.00  
 HCAP (healthcare-associated pneumonia) J18.9  
 HTN (hypertension) I10  
 Acquired hypothyroidism E03.9  Sepsis ([facility] A41.9  Hypernatremia E87.0  KEI (acute kidney injury) ([facility] N17.9 Prior to Admission Medications Prescriptions Last Dose Informant Patient Reported? Taking? Cholecalciferol, Vitamin D3, (VITAMIN D3) 1,000 unit cap   Yes No  
Sig: Take  by mouth every morning. Thyroid, Pork, (ARMOUR THYROID) 120 mg Tab   Yes No  
Sig: Take 150 mg by mouth daily. Takes 1 (120 mg) + 1 (30 mg) tab daily  
amLODIPine (NORVASC) 5 mg tablet   Yes No  
Sig: Take 5 mg by mouth daily. cyanocobalamin (VITAMIN B-12) 1,000 mcg sublingual tablet   Yes No  
Sig: Take 1,000 mcg by mouth every morning. dexAMETHasone (DECADRON) 4 mg tablet   No No  
Sig: One tab three times a day  
folic acid (FOLVITE) 1 mg tablet   No No  
Sig: Take 1 Tab by mouth daily. furosemide (LASIX) 20 mg tablet   No No  
Sig: Take 1 Tab by mouth daily. Indications: accumulation of fluid resulting from chronic heart failure  
ondansetron (ZOFRAN ODT) 8 mg disintegrating tablet   No No  
Sig: Take 1 Tab by mouth every eight (8) hours as needed for Nausea or Vomiting. Indications: prevent nausea and vomiting from cancer chemotherapy  
prochlorperazine (Compazine) 10 mg tablet   No No  
Sig: Take 1 Tab by mouth every six (6) hours as needed for Nausea or Vomiting for up to 30 days. Indications: nausea and vomiting caused by cancer drugs Facility-Administered Medications: None Past Medical History:  
Diagnosis Date  Acute ischemic stroke ([facility] 3/4/2021  Hypercholesterolemia  Hypertension   
 controlled with med  Morbid obesity ([facility] 11/11/15 BMI- 36.1  Other ill-defined conditions(799.89)   
 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   
 partial thyroid Past Surgical History:  
Procedure Laterality Date  HX COLONOSCOPY    
 HX HEENT    
 part of thyroid removed  HX HERNIA REPAIR  9685  
 umbilical  
 HX ORTHOPAEDIC Left   
 bunion removal on left great toe.  HX OTHER SURGICAL    
 thyroid removed (1)  IR INSERT TUNL CVC W PORT OVER 5 YEARS  2021 Social History Socioeconomic History  Marital status:  Spouse name: Not on file  Number of children: Not on file  Years of education: Not on file  Highest education level: Not on file Occupational History  Not on file Social Needs  Financial resource strain: Not on file  Food insecurity Worry: Not on file Inability: Not on file  Transportation needs Medical: Not on file Non-medical: Not on file Tobacco Use  Smoking status: Former Smoker Packs/day: 1.50 Years: 30.00 Pack years: 45.00 Quit date: 1986 Years since quittin.2  Smokeless tobacco: Former User  Tobacco comment: quit  Substance and Sexual Activity  Alcohol use: Yes Alcohol/week: 1.0 standard drinks Types: 1 Glasses of wine per week Comment: daily  Drug use: No  
 Sexual activity: Not on file Lifestyle  Physical activity Days per week: Not on file Minutes per session: Not on file  Stress: Not on file Relationships  Social connections Talks on phone: Not on file Gets together: Not on file Attends Protestant service: Not on file Active member of club or organization: Not on file Attends meetings of clubs or organizations: Not on file Relationship status: Not on file  Intimate partner violence Fear of current or ex partner: Not on file Emotionally abused: Not on file Physically abused: Not on file Forced sexual activity: Not on file Other Topics Concern  Not on file Social History Narrative  Not on file Family History Problem Relation Age of Onset  Heart Disease Brother 48 MI  
 Alzheimer Mother  Heart Disease Brother  Pacemaker Brother  Diabetes Sister  Alzheimer Sister  Alcohol abuse Brother  Alzheimer Brother  Diabetes Brother  Alcohol abuse Brother  Alzheimer Brother No Known Allergies Current Facility-Administered Medications Medication Dose Route Frequency  propofol (DIPRIVAN) 10 mg/mL infusion  10 mcg/kg/min IntraVENous TITRATE  
 . PHARMACY TO SUBSTITUTE PER PROTOCOL (Reordered from: cyanocobalamin (VITAMIN B-12) 1,000 mcg sublingual tablet)    Per Protocol  dexAMETHasone (DECADRON) tablet 4 mg  4 mg Per NG tube Q8H  
 [START ON 3/13/2021] Thyroid (Pork) tab 120 mg  120 mg Per NG tube DAILY  [START ON 3/13/2021] amLODIPine (NORVASC) tablet 5 mg  5 mg Per NG tube DAILY  sodium chloride (NS) flush 5-40 mL  5-40 mL IntraVENous Q8H  
 sodium chloride (NS) flush 5-40 mL  5-40 mL IntraVENous PRN  
 acetaminophen (TYLENOL) tablet 650 mg  650 mg Oral Q6H PRN Or  
 acetaminophen (TYLENOL) suppository 650 mg  650 mg Rectal Q6H PRN  polyethylene glycol (MIRALAX) packet 17 g  17 g Oral DAILY PRN  
 ondansetron (ZOFRAN) injection 4 mg  4 mg IntraVENous Q6H PRN  piperacillin-tazobactam (ZOSYN) 4.5 g in 0.9% sodium chloride (MBP/ADV) 100 mL MBP  4.5 g IntraVENous Q8H  
 fentaNYL in normal saline (pf) 25 mcg/mL infusion  0-1.5 mcg/kg/hr IntraVENous TITRATE  sodium chloride (NS) flush 5-10 mL  5-10 mL IntraVENous PRN  
 lactated ringers bolus infusion 1,000 mL  1,000 mL IntraVENous Q1H  
 dextrose 5% infusion  75 mL/hr IntraVENous CONTINUOUS  
 heparin (porcine) injection 5,000 Units  5,000 Units SubCUTAneous Q12H  
 vancomycin (VANCOCIN) 2000 mg in  ml infusion  2,000 mg IntraVENous ONCE  
 VANCOMYCIN INFORMATION NOTE   Other PRN Current Outpatient Medications Medication Sig  
 dexAMETHasone (DECADRON) 4 mg tablet One tab three times a day  folic acid (FOLVITE) 1 mg tablet Take 1 Tab by mouth daily.  prochlorperazine (Compazine) 10 mg tablet Take 1 Tab by mouth every six (6) hours as needed for Nausea or Vomiting for up to 30 days. Indications: nausea and vomiting caused by cancer drugs  ondansetron (ZOFRAN ODT) 8 mg disintegrating tablet Take 1 Tab by mouth every eight (8) hours as needed for Nausea or Vomiting. Indications: prevent nausea and vomiting from cancer chemotherapy  furosemide (LASIX) 20 mg tablet Take 1 Tab by mouth daily. Indications: accumulation of fluid resulting from chronic heart failure  amLODIPine (NORVASC) 5 mg tablet Take 5 mg by mouth daily.  cyanocobalamin (VITAMIN B-12) 1,000 mcg sublingual tablet Take 1,000 mcg by mouth every morning.  Cholecalciferol, Vitamin D3, (VITAMIN D3) 1,000 unit cap Take  by mouth every morning.  Thyroid, Pork, (ARMOUR THYROID) 120 mg Tab Take 150 mg by mouth daily. Takes 1 (120 mg) + 1 (30 mg) tab daily Objective:  
 
Vitals:  
 03/12/21 1128 03/12/21 1137 03/12/21 1157 03/12/21 1237 BP:  129/67 119/64 120/62 Pulse:  95 90 87 Resp:      
Temp:    98.4 °F (36.9 °C) SpO2: 97% 95% 95% 96% Weight:      
Height: PHYSICAL EXAM  
 
Constitutional:  the patient is well developed and in no acute distress EENMT:  Sclera clear, pupils equal, oral mucosa moist 
Respiratory: clear Cardiovascular:  RRR without M,G,R 
Gastrointestinal: soft and non-tender; with positive bowel sounds. Musculoskeletal: warm without cyanosis. There is no lower extremity edema. Skin:  no jaundice or rashes, no wounds Neurologic: no gross neuro deficits Psychiatric:  sedated CXR:  LLL atelectasis Recent Labs  
  03/12/21 
1119 WBC 16.2* HGB 10.9* HCT 35.4*  
* Recent Labs  
  03/12/21 
1119 *  
K 5.0  
* * CO2 27 * CREA 2.90* CA 8.9 ALB 2.0*  
TBILI 0.6 ALT 54 Recent Labs  
  03/12/21 
1145 PHI 7.40 PCO2I 37.2 PO2I 112* HCO3I 23.2 Recent Labs  
  03/12/21 
1119 LAC 1.3  
 
PFT 12/2020 Assessment:  (Medical Decision Making) Hospital Problems  Date Reviewed: 3/5/2021 Codes Class Noted POA * (Principal) Acute respiratory failure ([facility] ICD-10-CM: J96.00 
ICD-9-CM: 518.81  3/12/2021 Unknown HCAP (healthcare-associated pneumonia) ICD-10-CM: J18.9 ICD-9-CM: 944  3/12/2021 Unknown HTN (hypertension) ICD-10-CM: I10 
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 
-- Wean ventilator as tolerated, wean O2, on  
-- Keep sedation at RASS 0- -1, avoid oversedation 
-- continue Vanc and Zosyn , D1; follow cultures -- Continue D5 for hypernatremia, avoid over correction too quickly -- trend troponin Continue DVT prophylaxis and add GI PPI Prophylaxis. Patient is DNR. More than 50% of the time documented was spent in face-to-face contact with the patient and in the care of the patient on the floor/unit where the patient is located. Thank you very much for this referral.  We appreciate the opportunity to participate in this patient's care. Will follow along with above stated plan. Dong Jeffrey NP Lungs: clear Heart:  RRR with no Murmur/Rubs/Gallops Additional Comments:  Now on vent with resp distress earler today Will get d dmer, ultrasound, iv fluids, contiue anti bx, and will get chest ct now nocode I have spoken with and examined the patient. I agree with the above assessment and plan as documented.  
 
Indira Cohen MD

## 2021-03-12 NOTE — ED NOTES
TRANSFER - OUT REPORT: 
 
Verbal report given to [address] on Alexandra Homans  being transferred to [address] [place] [address] for routine progression of care Report consisted of patients Situation, Background, Assessment and  
Recommendations(SBAR). Information from the following report(s) ED Summary, MAR, Recent Results and Cardiac Rhythm NSR was reviewed with the receiving nurse. Lines:  
Venous Access Device Upper chest (subclavicular area), left (Active) Date Accessed (Medial Site) 03/12/21 03/12/21 1127 Access Time (Medial Site) 1118 03/12/21 1127 Access Needle Size (Site #1) 20 G 03/12/21 1127 Access Needle Length (Medial Site) 0.75 inches 03/12/21 1127 Positive Blood Return (Medial Site) Yes 03/12/21 1127 Action Taken (Medial Site) Blood drawn;Flushed 03/12/21 1127 Peripheral IV Right Forearm (Active) Peripheral IV 03/12/21 Left Wrist (Active) Opportunity for questions and clarification was provided. Patient transported with: 
 Monitor O2 @ 16 liters Registered Nurse Tech

## 2021-03-12 NOTE — PROGRESS NOTES
Patient was intubated with a number 7.0 ET Tube. Tube placement verified by CXR. ET Tube is secured at the 23 cm judy at the teeth and on the left side. Patient was intubated by EMS on the unknown attempt. Breath sounds are coarse. Patient is Negative for subcutaneous air and chest excursion is symmetric. Trachea is midline. Patient is also Negative for cyanosis and is Negative for pitting edema. Patient placed on ventilator on documented settings. All alarms are set and audible. Resuscitation bag is at the head of the bed. Ventilator Settings Mode FIO2 Rate Tidal Volume Pressure PEEP I:E Ratio Assist control, VC+  60 %    450 ml     8 cm H20  1:2.3 Peak airway pressure: 26 cm H2O Minute ventilation: 7.6 l/min ABG: No results for input(s): PH, PCO2, PO2, HCO3 in the last 72 hours.

## 2021-03-13 PROBLEM — R78.81 GRAM-POSITIVE COCCI BACTEREMIA: Status: ACTIVE | Noted: 2021-01-01

## 2021-03-13 PROBLEM — J96.01 ACUTE HYPOXEMIC RESPIRATORY FAILURE (HCC): Status: ACTIVE | Noted: 2021-01-01

## 2021-03-13 PROBLEM — R65.20 SEVERE SEPSIS (HCC): Status: ACTIVE | Noted: 2021-01-01

## 2021-03-13 NOTE — PROGRESS NOTES
Ventilator check complete; patient has a #7. 0 ET tube secured at the 23 at the teeth. Patient is sedated. Patient is not able to follow commands. Breath sounds are diminished. Trachea is midline, Negative for subcutaneous air, and chest excursion is symmetric. Patient is also Negative for cyanosis and is Negative for pitting edema. All alarms are set and audible. Resuscitation bag is at the head of the bed. Ventilator Settings Mode FIO2 Rate Tidal Volume Pressure PEEP I:E Ratio Assist control  30 %    450 ml     8 cm H20  1:1.8 Peak airway pressure: 18 cm H2O Minute ventilation: 6.3 l/min ABG: No results for input(s): PH, PCO2, PO2, HCO3 in the last 72 hours.  
 
 
Osmar Husbands, RT

## 2021-03-13 NOTE — ROUTINE PROCESS
Discussed with Dr. Ya Ibrahim that patient's POC glucose was 361 following ordered POC BGL check by Dr. Goodwin Sites with SSI Q6H to start at 1200. Received a one time verbal order with read back to give 15 units SQ now and recheck BGL and dose according to SSI again at 1200.

## 2021-03-13 NOTE — PROGRESS NOTES
Hospitalist Progress Note Admit Date:  3/12/2021 10:58 AM  
Name:  Carmella Ordoñez Age:  68 y.o. 
:  1940 MRN:  [id number] PCP:  Denae Claire MD 
Treatment Team: Attending Provider: Mauro Carter MD; Consulting Provider: Yuli Guzman MD; Primary Nurse: Isa Dutton Presenting Complaint: Shortness of Breath Hospital Course: Mr. Arpita Durán is a 67 y/o WM with a h/o stage 4 lung adenocarcinoma, HTN, HLD and hypothyroid recently admitted for suspected CVA. Discharged on 3/9. EMS called to rehab facility on 3/12 due to respiratory distress. He was intubated in the field and brought here. Labs showed leukocytosis (though recently has been on Decadron), KEI, hypernatremia. CXR showed a developing hazy infiltrate at the left base. Started on antibiotics and sedation. Admitted to ICU. Pulmonology consulted. Started on D5 for hyperNa. His wife has made him DNR should he deteriorate further. 24hr Events/Subjective:  
3/13: Intubated/sedated. Sodium is better, Cr also a little better. LA normal, US neg for LE DVTs. Assessment and Plan:  
 
Hospital Problems as of 3/13/2021 Date Reviewed: 3/5/2021 Codes Class Noted - Resolved POA Gram-positive cocci bacteremia ICD-10-CM: R78.81 ICD-9-CM: 790.7, 041.89  3/13/2021 - Present Yes * (Principal) Acute hypoxemic respiratory failure ([facility] [address] ICD-10-CM: J96.01 
ICD-9-CM: 518.81  3/12/2021 - Present Unknown HCAP (healthcare-associated pneumonia) ICD-10-CM: J18.9 ICD-9-CM: 604  3/12/2021 - Present Yes HTN (hypertension) ICD-10-CM: I10 
ICD-9-CM: 401.9  3/12/2021 - Present Yes Acquired hypothyroidism ICD-10-CM: E03.9 ICD-9-CM: 244.9  3/12/2021 - Present Yes Severe sepsis (HCC) ICD-10-CM: A41.9, R65.20 ICD-9-CM: 038.9, 995.92  3/12/2021 - Present Unknown Hypernatremia ICD-10-CM: E87.0 ICD-9-CM: 276.0  3/12/2021 - Present Yes  KEI (acute kidney injury) ([facility] [address] ICD-10-CM: N17.9 ICD-9-CM: 584.9  3/12/2021 - Present Yes Mild protein-calorie malnutrition (HCC) (Chronic) ICD-10-CM: E44.1 ICD-9-CM: 263.1  3/12/2021 - Present Yes Primary malignant neoplasm of lung metastatic to other site [facility]) ICD-10-CM: C34.90 ICD-9-CM: 162.9  12/28/2020 - Present Yes Plan: # Acute hypoxemic respiratory failure - In setting of metastatic lung cancer and possible HAP. Con't broad spectrum abx for now. LE US neg for DVTs, d-dimer elevation could be KEI, infection, hypoxia. Appreciate Pulm help with vent management. # Severe sepsis - Present on admission. Leukocytosis + tachy with suspected infiltrate, now with 1/4 bottles GPCs. Sepsis resolved. Con't abx. F/u cultures. Repeat cultures tomorrow. # Hypernatremia - Better. Likely 2/2 poor PO intake. Con't D5, Na q6h # KEI 
 - Suspect pre-renal. Con't IVFs. Cr 2.9 on admission, a little better today at 2.6. Watch uop. # Elevated d-dimer - LE US neg for DVT, could be realted to KEI, infection, hypoxia as noted above. # Hypothyroid - Pork thyroid # Stage 4 lung cancer Other listed chronic conditions stable, continue current management. DC planning: Pending. Wife made him DNR on 3/12. Diet:  DIET NPO 
DIET TUBE FEEDING 
DVT ppx: Heparin Objective:  
 
Patient Vitals for the past 24 hrs: 
 Temp Pulse Resp BP SpO2  
03/13/21 0816  70 15  99 % 03/13/21 0730 97.4 °F (36.3 °C)      
03/13/21 0630  70 15 95/62 100 % 03/13/21 0615  69 16 100/63 99 % 03/13/21 0600  69 16 107/65 100 % 03/13/21 0545  71 15 100/64 98 % 03/13/21 0530  71 16 98/60 100 % 03/13/21 0515  70 16 100/62 100 % 03/13/21 0500  70 15 104/67 100 % 03/13/21 0445  69 15 104/64 99 % 03/13/21 0430  70 16 107/67 100 % 03/13/21 0427  70 16  100 % 03/13/21 0415  69 15 (!) 85/59 99 % 03/13/21 0400 97 °F (36.1 °C) 70 16 100/62 100 % 03/13/21 0345  70 15 (!) 112/59 100 % 03/13/21 0330  69 15 105/64 99 %  
03/13/21 0315  68 16 98/65 99 % 03/13/21 0300  68 15 107/63 100 % 03/13/21 0245  69 15 99/65 100 % 03/13/21 0230  70 16 99/63 99 % 03/13/21 0215  70 16 103/63 100 % 03/13/21 0200  69 16 97/62 98 % 03/13/21 0145  67 15 97/63 99 % 03/13/21 0130  71 15 97/65 99 % 03/13/21 0115  69 16 104/66 98 % 03/13/21 0100  70 15 101/63 98 % 03/13/21 0045  71 16 99/60 99 % 03/13/21 0031  72 16  99 % 03/13/21 0030    100/66   
03/13/21 0020  72 16  99 % 03/13/21 0015  72 16 103/63 100 % 03/13/21 0000  72 16 103/67 99 % 03/12/21 2345  73 15 102/65 97 % 03/12/21 2330  72 15 108/65 100 % 03/12/21 2315  73 16 123/76 100 % 03/12/21 2300 97.2 °F (36.2 °C) 70 15 100/65 98 % 03/12/21 2245  71 16 105/62 99 % 03/12/21 2230  71 15 97/61 99 % 03/12/21 2215  69 15 92/61 98 % 03/12/21 2200  74 16 94/61 99 % 03/12/21 2145  73 15 102/65 97 % 03/12/21 2130  76 15 110/62 97 % 03/12/21 2115  73 15 100/63 99 % 03/12/21 2100  75 15 99/63 98 % 03/12/21 2045  74 15 95/62 98 % 03/12/21 2030  73 16 (!) 94/59 98 % 03/12/21 2015  74 16 103/66 99 % 03/12/21 2014  75 16  100 % 03/12/21 2000  75 16 102/62 99 % 03/12/21 1945  74 15 99/62 98 % 03/12/21 1930 97 °F (36.1 °C) 74 16 94/66 98 % 03/12/21 1917  74 15 (!) 98/59 98 % 03/12/21 1900  77 16 104/60 98 % 03/12/21 1630  73 15 102/62 98 % 03/12/21 1618 97.7 °F (36.5 °C)      
03/12/21 1600  76 16 106/60 97 % 03/12/21 1530  77 16 112/62 97 % 03/12/21 1505  83 17  100 % 03/12/21 1500  80 15 108/64 99 % 03/12/21 1430  88 16 118/70 100 % 03/12/21 1414 99 °F (37.2 °C)      
03/12/21 1400  89 16 (!) 100/58 99 % 03/12/21 1357  90 16 99/61 99 % 03/12/21 1334 99 °F (37.2 °C) 84 17 104/69 99 % 03/12/21 1237 98.4 °F (36.9 °C) 87  120/62 96 % 03/12/21 1157  90  119/64 95 % 03/12/21 1137  95  129/67 95 % 03/12/21 1128     97 % 03/12/21 1117  98  (!) 145/82 98 %  
03/12/21 1110  100   98 % 03/12/21 1109 98.8 °F (37.1 °C) (!) 103 18 (!) 151/77 97 % Oxygen Therapy O2 Sat (%): 99 % (03/13/21 0816) Pulse via Oximetry: 71 beats per minute (03/13/21 0630) O2 Device: Endotracheal tube;Ventilator (03/13/21 0750) Skin Assessment: Clean, dry, & intact (03/13/21 0816) Skin Protection for O2 Device: N/A (03/13/21 0400) O2 Flow Rate (L/min): 16 l/min (03/12/21 1237) FIO2 (%): 30 % (03/13/21 0816) Estimated body mass index is 25.71 kg/m² as calculated from the following: 
  Height as of this encounter: 6' (1.829 m). Weight as of this encounter: 86 kg (189 lb 9.6 oz). Intake/Output Summary (Last 24 hours) at 3/13/2021 7602 Last data filed at 3/13/2021 4500 Gross per 24 hour Intake 2511.26 ml Output 2610 ml Net -98.74 ml *Note that automatically entered I/Os may not be accurate; dependent on patient compliance with collection and accurate  by techs. General:    Intubated/sedated. CV:   RRR. No m/r/g. No edema. No JVD Lungs:   CTAB. No wheezing, rhonchi, or rales. Unlabored. Abdomen:   Soft, nontender, nondistended. Extremities: Warm and dry. No cyanosis. B/l stasis dermatitis, mild b/l pitting edema. Skin:     No rashes. Normal coloration. Stasis dermatitis as above. Neuro:  Unable to assess, intubated/sedated. Data Reviewed: 
I have reviewed all labs, meds, and studies from the last 24 hours: 
Recent Results (from the past 24 hour(s)) CULTURE, BLOOD Collection Time: 03/12/21 10:17 AM  
 Specimen: Blood Result Value Ref Range Special Requests: NO SPECIAL REQUESTS    
 GRAM STAIN GRAM POSITIVE COCCI    
 GRAM STAIN ANAEROBIC BOTTLE POSITIVE    
 GRAM STAIN Leonela NICHOLSON.XIOBLOXH ON 3/13/21 @ 0810 BY VALENTINA   
 Culture result: CULTURE IN PROGRESS,FURTHER UPDATES TO FOLLOW    
CBC WITH AUTOMATED DIFF Collection Time: 03/12/21 11:19 AM  
Result Value Ref Range  WBC 16.2 (H) 4.3 - 11.1 K/uL  
 RBC 3.71 (L) 4.23 - 5.6 M/uL  
 HGB 10.9 (L) 13.6 - 17.2 g/dL HCT 35.4 (L) 41.1 - 50.3 % MCV 95.4 79.6 - 97.8 FL  
 MCH 29.4 26.1 - 32.9 PG  
 MCHC 30.8 (L) 31.4 - 35.0 g/dL RDW 20.8 (H) 11.9 - 14.6 % PLATELET 410 (L) 110 - 450 K/uL MPV 11.3 9.4 - 12.3 FL ABSOLUTE NRBC 0.00 0.0 - 0.2 K/uL  
 DF AUTOMATED NEUTROPHILS 93 (H) 43 - 78 % LYMPHOCYTES 2 (L) 13 - 44 % MONOCYTES 3 (L) 4.0 - 12.0 % EOSINOPHILS 0 (L) 0.5 - 7.8 % BASOPHILS 0 0.0 - 2.0 % IMMATURE GRANULOCYTES 2 0.0 - 5.0 %  
 ABS. NEUTROPHILS 15.0 (H) 1.7 - 8.2 K/UL  
 ABS. LYMPHOCYTES 0.3 (L) 0.5 - 4.6 K/UL  
 ABS. MONOCYTES 0.5 0.1 - 1.3 K/UL  
 ABS. EOSINOPHILS 0.0 0.0 - 0.8 K/UL  
 ABS. BASOPHILS 0.0 0.0 - 0.2 K/UL  
 ABS. IMM. GRANS. 0.3 0.0 - 0.5 K/UL  
LACTIC ACID Collection Time: 03/12/21 11:19 AM  
Result Value Ref Range Lactic acid 1.3 0.4 - 2.0 MMOL/L  
CULTURE, BLOOD Collection Time: 03/12/21 11:19 AM  
 Specimen: Blood Result Value Ref Range Special Requests: LEFT 
PORT Culture result: NO GROWTH AFTER 19 HOURS METABOLIC PANEL, COMPREHENSIVE Collection Time: 03/12/21 11:19 AM  
Result Value Ref Range Sodium 159 (H) 136 - 145 mmol/L Potassium 5.0 3.5 - 5.1 mmol/L Chloride 127 (H) 98 - 107 mmol/L  
 CO2 27 21 - 32 mmol/L Anion gap 5 (L) 7 - 16 mmol/L Glucose 218 (H) 65 - 100 mg/dL  (H) 8 - 23 MG/DL Creatinine 2.90 (H) 0.8 - 1.5 MG/DL  
 GFR est AA 27 (L) >60 ml/min/1.73m2 GFR est non-AA 22 (L) >60 ml/min/1.73m2 Calcium 8.9 8.3 - 10.4 MG/DL Bilirubin, total 0.6 0.2 - 1.1 MG/DL  
 ALT (SGPT) 54 12 - 65 U/L  
 AST (SGOT) 14 (L) 15 - 37 U/L Alk. phosphatase 119 50 - 136 U/L Protein, total 5.6 (L) 6.3 - 8.2 g/dL Albumin 2.0 (L) 3.2 - 4.6 g/dL Globulin 3.6 (H) 2.3 - 3.5 g/dL A-G Ratio 0.6 (L) 1.2 - 3.5 PROCALCITONIN Collection Time: 03/12/21 11:19 AM  
Result Value Ref Range Procalcitonin 0.49 ng/mL TROPONIN-HIGH SENSITIVITY  Collection Time: 03/12/21 11:19 AM  
Result Value Ref Range Troponin-High Sensitivity 162.6 (HH) 0 - 14 pg/mL D DIMER Collection Time: 03/12/21 11:20 AM  
Result Value Ref Range D DIMER 2.32 (H) <0.56 ug/ml(FEU) EKG, 12 LEAD, INITIAL Collection Time: 03/12/21 11:33 AM  
Result Value Ref Range Ventricular Rate 99 BPM  
 Atrial Rate 99 BPM  
 P-R Interval 328 ms QRS Duration 104 ms Q-T Interval 422 ms QTC Calculation (Bezet) 541 ms Calculated P Axis 46 degrees Calculated R Axis 84 degrees Calculated T Axis 33 degrees Diagnosis Sinus rhythm with 1st degree A-V block Incomplete right bundle branch block Prolonged QT Abnormal ECG When compared with ECG of 04-MAR-2021 12:06, No significant change was found Confirmed by Luis Martínez ([id number]) on 3/12/2021 1:04:21 PM 
  
POC G3 Collection Time: 03/12/21 11:45 AM  
Result Value Ref Range Device: VENT    
 FIO2 (POC) 60 % pH (POC) 7.40 7.35 - 7.45    
 pCO2 (POC) 37.2 35 - 45 MMHG  
 pO2 (POC) 112 (H) 75 - 100 MMHG  
 HCO3 (POC) 23.2 22 - 26 MMOL/L  
 sO2 (POC) 98 95 - 98 % Base deficit (POC) 1 mmol/L Mode ASSIST CONTROL Tidal volume 450 ml Set Rate 16 bpm  
 PEEP/CPAP (POC) 8 cmH2O Allens test (POC) YES Inspiratory Time 1 sec Site RIGHT RADIAL Specimen type (POC) ARTERIAL Performed by [place]   
 CO2, POC 24 MMOL/L Critical value read back 00:01 COLLECT TIME 1,140 LACTIC ACID Collection Time: 03/12/21  2:09 PM  
Result Value Ref Range Lactic acid 1.3 0.4 - 2.0 MMOL/L  
TROPONIN-HIGH SENSITIVITY Collection Time: 03/12/21  2:09 PM  
Result Value Ref Range Troponin-High Sensitivity 163.3 (HH) 0 - 14 pg/mL URINALYSIS W/ RFLX MICROSCOPIC Collection Time: 03/12/21  3:36 PM  
Result Value Ref Range Color YELLOW Appearance TURBID Specific gravity 1.015 1.001 - 1.023    
 pH (UA) 5.0 5.0 - 9.0 Protein TRACE (A) NEG mg/dL Glucose Negative mg/dL  Ketone Negative NEG mg/dL Bilirubin Negative NEG Blood LARGE (A) NEG Urobilinogen 0.2 0.2 - 1.0 EU/dL Nitrites Negative NEG Leukocyte Esterase LARGE (A) NEG    
 WBC >100 (H) 0 /hpf  
 RBC 20-50 0 /hpf Epithelial cells 0-3 0 /hpf Bacteria TRACE 0 /hpf Casts 0-3 0 /lpf Crystals, urine MODERATE 0 /LPF Mucus 1+ (H) 0 /lpf  
TROPONIN-HIGH SENSITIVITY Collection Time: 03/12/21  6:22 PM  
Result Value Ref Range Troponin-High Sensitivity 129.6 (HH) 0 - 14 pg/mL METABOLIC PANEL, BASIC Collection Time: 03/12/21  6:22 PM  
Result Value Ref Range Sodium 157 (H) 136 - 145 mmol/L Potassium 4.3 3.5 - 5.1 mmol/L Chloride 124 (H) 98 - 107 mmol/L  
 CO2 26 21 - 32 mmol/L Anion gap 7 7 - 16 mmol/L Glucose 284 (H) 65 - 100 mg/dL  (H) 8 - 23 MG/DL Creatinine 2.73 (H) 0.8 - 1.5 MG/DL  
 GFR est AA 29 (L) >60 ml/min/1.73m2 GFR est non-AA 24 (L) >60 ml/min/1.73m2 Calcium 8.5 8.3 - 10.4 MG/DL  
SARS-COV-2 Collection Time: 03/12/21  9:20 PM  
Result Value Ref Range SARS-CoV-2 Please find results under separate order COVID-19 RAPID TEST Collection Time: 03/12/21  9:20 PM  
Result Value Ref Range Specimen source Nasopharyngeal    
 COVID-19 rapid test Not detected NOTD METABOLIC PANEL, BASIC Collection Time: 03/13/21  4:17 AM  
Result Value Ref Range Sodium 155 (H) 136 - 145 mmol/L Potassium 4.3 3.5 - 5.1 mmol/L Chloride 122 (H) 98 - 107 mmol/L  
 CO2 27 21 - 32 mmol/L Anion gap 6 (L) 7 - 16 mmol/L Glucose 339 (H) 65 - 100 mg/dL  (H) 8 - 23 MG/DL Creatinine 2.63 (H) 0.8 - 1.5 MG/DL  
 GFR est AA 30 (L) >60 ml/min/1.73m2 GFR est non-AA 25 (L) >60 ml/min/1.73m2 Calcium 8.5 8.3 - 10.4 MG/DL  
CBC WITH AUTOMATED DIFF Collection Time: 03/13/21  4:17 AM  
Result Value Ref Range WBC 11.8 (H) 4.3 - 11.1 K/uL  
 RBC 3.27 (L) 4.23 - 5.6 M/uL HGB 9.7 (L) 13.6 - 17.2 g/dL HCT 31.1 (L) 41.1 - 50.3 %  MCV 95.1 79.6 - 97.8 FL  
 MCH 29.7 26.1 - 32.9 PG  
 MCHC 31.2 (L) 31.4 - 35.0 g/dL RDW 20.1 (H) 11.9 - 14.6 % PLATELET 82 (L) 861 - 450 K/uL MPV 11.2 9.4 - 12.3 FL ABSOLUTE NRBC 0.00 0.0 - 0.2 K/uL  
 DF AUTOMATED NEUTROPHILS 93 (H) 43 - 78 % LYMPHOCYTES 2 (L) 13 - 44 % MONOCYTES 3 (L) 4.0 - 12.0 % EOSINOPHILS 1 0.5 - 7.8 % BASOPHILS 0 0.0 - 2.0 % IMMATURE GRANULOCYTES 1 0.0 - 5.0 %  
 ABS. NEUTROPHILS 10.9 (H) 1.7 - 8.2 K/UL  
 ABS. LYMPHOCYTES 0.3 (L) 0.5 - 4.6 K/UL  
 ABS. MONOCYTES 0.3 0.1 - 1.3 K/UL  
 ABS. EOSINOPHILS 0.1 0.0 - 0.8 K/UL  
 ABS. BASOPHILS 0.0 0.0 - 0.2 K/UL  
 ABS. IMM. GRANS. 0.2 0.0 - 0.5 K/UL  
POC G3 Collection Time: 03/13/21  4:21 AM  
Result Value Ref Range Device: VENT    
 FIO2 (POC) 40 % pH (POC) 7.39 7.35 - 7.45    
 pCO2 (POC) 37.3 35 - 45 MMHG  
 pO2 (POC) 143 (H) 75 - 100 MMHG  
 HCO3 (POC) 22.7 22 - 26 MMOL/L  
 sO2 (POC) 99 (H) 95 - 98 % Base deficit (POC) 2 mmol/L Mode ASSIST CONTROL Tidal volume 450 ml Set Rate 16 bpm  
 PEEP/CPAP (POC) 8 cmH2O Allens test (POC) YES Site RIGHT RADIAL Specimen type (POC) ARTERIAL Performed by Ivy Mcneill CO2, POC 24 (H) 13 - 23 MMOL/L Exhaled minute volume 6.99 L/min COLLECT TIME 419 Current Meds: 
Current Facility-Administered Medications Medication Dose Route Frequency  famotidine (PF) (PEPCID) 20 mg in 0.9% sodium chloride 10 mL injection  20 mg IntraVENous Q12H  
 insulin regular (NOVOLIN R, HUMULIN R) injection   SubCUTAneous Q6H  
 propofol (DIPRIVAN) 10 mg/mL infusion  10 mcg/kg/min IntraVENous TITRATE  dexAMETHasone (DECADRON) tablet 4 mg  4 mg Per NG tube Q8H  
 thyroid (Pork) (ARMOUR) tablet 150 mg  150 mg Per NG tube DAILY  amLODIPine (NORVASC) tablet 5 mg  5 mg Per NG tube DAILY  sodium chloride (NS) flush 5-40 mL  5-40 mL IntraVENous Q8H  
 sodium chloride (NS) flush 5-40 mL  5-40 mL IntraVENous PRN  
 acetaminophen (TYLENOL) tablet 650 mg  650 mg Oral Q6H PRN Or  
 acetaminophen (TYLENOL) suppository 650 mg  650 mg Rectal Q6H PRN  polyethylene glycol (MIRALAX) packet 17 g  17 g Oral DAILY PRN  
 ondansetron (ZOFRAN) injection 4 mg  4 mg IntraVENous Q6H PRN  piperacillin-tazobactam (ZOSYN) 4.5 g in 0.9% sodium chloride (MBP/ADV) 100 mL MBP  4.5 g IntraVENous Q8H  
 fentaNYL in normal saline (pf) 25 mcg/mL infusion  0-1.5 mcg/kg/hr IntraVENous TITRATE  sodium chloride (NS) flush 5-10 mL  5-10 mL IntraVENous PRN  
 dextrose 5% infusion  100 mL/hr IntraVENous CONTINUOUS  
 heparin (porcine) injection 5,000 Units  5,000 Units SubCUTAneous Q12H  VANCOMYCIN INFORMATION NOTE   Other PRN  
 NUTRITIONAL SUPPORT ELECTROLYTE PRN ORDERS   Does Not Apply PRN Other Studies: No results found for this visit on 03/12/21. Xr Chest Sngl V Result Date: 3/13/2021 EXAM: XR CHEST SNGL V HISTORY: intubated, respiratory failure. TECHNIQUE: A single frontal view of the chest was submitted. COMPARISON: Multiple prior studies with most recent on FINDINGS: The endotracheal tube remains in place as does the left MediPort. There has been placement of a nasogastric tube. The distal tip is not included on this film. The side-port is seen and may be near the gastroesophageal junction; however, the left hemidiaphragm is silhouetted making it difficult to clearly demonstrate its location. Soft tissue density is again seen in the right upper lobe along with significant superior pleural thickening. No other significant interval changes. Findings as above. Xr Chest [facility] Result Date: 3/12/2021 Exam: XR CHEST PORT on 3/12/2021 11:18 AM Clinical History: The Male patient is 68years old  presenting for intubated. Comparison:  Chest x-ray 3/4/2021 Findings:  Frontal view of the chest was obtained. Endotracheal tube tip is just above the bridgette and should be pulled back at least 3 to 4 cm to ensure optimal functioning.  There is a stable right paratracheal mass with right apical pleural thickening. Slight hazy infiltrate has developed in the left lung base. The cardiomediastinal silhouette is otherwise stable. There are no acute osseous abnormalities. A left jugular chest port is in place. 1. Endotracheal tube tip at bridgette and should be pulled back to ensure optimal functioning. 2. Developing mild hazy infiltrate at left lung base with otherwise stable exam.. CPT code(s) 56498 Duplex Lower Ext Venous Bilat Result Date: 3/13/2021 EXAMINATION: DUPLEX LOWER EXT VENOUS BILAT HISTORY: dvt. TECHNIQUE: Sonographic evaluation of bilateral lower extremities was performed utilizing 2-D grayscale, color flow Doppler imaging, and spectral waveform analysis. COMPARISON: Left lower extremity dated 1/4/2021 FINDINGS: Bilateral common femoral, superficial femoral, popliteal, and greater saphenous veins demonstrate normal compressibility and Doppler waveforms. The saphenofemoral junction is unremarkable. Bilateral posterior tibial and peroneal veins are without evidence of thrombosis. No evidence of deep venous thrombosis within the bilateral lower extremities. All Micro Results Procedure Component Value Units Date/Time CULTURE, BLOOD [[id number]] Collected: 03/12/21 1017 Order Status: Completed Specimen: Blood Updated: 03/13/21 0813 Special Requests: NO SPECIAL REQUESTS     
  GRAM STAIN GRAM POSITIVE COCCI ANAEROBIC BOTTLE POSITIVE     
   VERFAVIAN PETTITXEEWYYUB ON 3/13/21 @ 0810 BY VALENTINA  
  Culture result:    
  CULTURE IN PROGRESS,FURTHER UPDATES TO FOLLOW CULTURE, BLOOD [[id number]] Collected: 03/12/21 1119 Order Status: Completed Specimen: Blood Updated: 03/13/21 2507 Special Requests: --     
  LEFT 
PORT Culture result: NO GROWTH AFTER 19 HOURS     
 CULTURE, RESPIRATORY/SPUTUM/BRONCH Jolene Bull STAIN [[id number]] Collected: 03/13/21 0429  Order Status: Completed Specimen: Sputum Updated: 03/13/21 0530 CULTURE, RESPIRATORY/SPUTUM/BRONCH Benetta Anivaling [[id number]] Collected: 03/13/21 0426 Order Status: Canceled Specimen: Sputum COVID-19 RAPID TEST [[id number]] Collected: 03/12/21 2120 Order Status: Completed Specimen: Nasopharyngeal Updated: 03/12/21 2154 Specimen source Nasopharyngeal     
  COVID-19 rapid test Not detected Comment:     
The specimen is NEGATIVE for SARS-CoV-2, the novel coronavirus associated with COVID-19. A negative result does not rule out COVID-19. This test has been authorized by the FDA under an Emergency Use Authorization (EUA) for use by authorized laboratories. Fact sheet for Healthcare Providers: ConventionUpdate.co.nz Fact sheet for Patients: ConventionUpdate.co.nz Methodology: Isothermal Nucleic Acid Amplification SARS-CoV-2 Lab Results \"Novel Coronavirus\" Test: No results found for: COV2NT \"Emergent Disease\" Test: No results found for: EDPR \"SARS-COV-2\" Test: No results found for: XGCOVT Rapid Test:  
Lab Results Component Value Date/Time  COVR Not detected 03/12/2021 09:20 PM  
 
  
 
 
Signed: 
Vernetta Moritz, MD

## 2021-03-13 NOTE — PROGRESS NOTES
Ventilator check complete; patient has a #7. 0 ET tube secured at the 24 at the lip. Breath sounds are coarse. Trachea is midline, Negative for subcutaneous air, and chest excursion is symmetric. Patient is also Negative for cyanosis. All alarms are set and audible. Resuscitation bag is at the head of the bed. Ventilator Settings Mode FIO2 Rate Tidal Volume Pressure PEEP I:E Ratio Assist control, Volume control  40 %    450 ml     8 cm H20  1:1.8 Peak airway pressure: 19 cm H2O Minute ventilation: 7.02 l/min ABG: No results for input(s): PH, PCO2, PO2, HCO3 in the last 72 hours. [facility]

## 2021-03-13 NOTE — PROGRESS NOTES
Results for Karla Ivey (MRN [id number]) as of 3/13/2021 05:31 Ref. Range 3/13/2021 04:21 3/13/2021 04:26 Exhaled minute volume Latest Units: L/min 6.99   
pH (POC) Latest Ref Range: 7.35 - 7.45   7.39   
pCO2 (POC) Latest Ref Range: 35 - 45 MMHG 37.3 pO2 (POC) Latest Ref Range: 75 - 100 MMHG 143 (H) HCO3 (POC) Latest Ref Range: 22 - 26 MMOL/L 22.7   
sO2 (POC) Latest Ref Range: 95 - 98 % 99 (H) Base deficit (POC) Latest Units: mmol/L 2   
FIO2 (POC) Latest Units: % 40 Specimen type (POC) Latest Units:   ARTERIAL Set Rate Latest Units: bpm 16 Site Latest Units:   RIGHT RADIAL Device: Latest Units:   VENT Mode Latest Units:   ASSIST CONTROL Tidal volume Latest Units: ml 450 PEEP/CPAP (POC) Latest Units: cmH2O 8 Allens test (POC) Latest Units:   YES   
COLLECT TIME Latest Units:   419

## 2021-03-13 NOTE — CONSULTS
Comprehensive Nutrition Assessment Type and Reason for Visit: Initial, Consult Tube Feeding Management (Dr. Pedro Meza) Nutrition Recommendations/Plan:  
Enteral Nutrition:  
Osmolite 1.2 via NGT at 20 ml/hr progress by 10 ml/hour every 4 hours to goal rate of 70 ml/hour. Water flush 30 ml/1 hours. At goal will provide 2016 kcal (100% estimated calorie needs), 93 grams protein (100% estimated protein needs) and 2038 ml free fluid (1 ml/kcal) for 22 hours infusion. IVF: 
Per MD. Vitamin and Mineral Supplement Therapy: 
Electrolyte management replacement protocol active. Labs:  BMP daily, Mg and Phos MWF. Malnutrition Assessment: 
Malnutrition Status: Mild malnutrition Context: Chronic illness Findings of clinical characteristics of malnutrition:  
Energy Intake:  7 - 75% or less est energy requirements for 1 month or longer Weight Loss:  No significant weight loss Body Fat Loss:  No significant body fat loss, Muscle Mass Loss:  No significant muscle mass loss, Nutrition Assessment:  
Nutrition History: Pt has not been eating or drinking well for weeks (H&P noted wife mentioned trouble swallowing). Nutrition Background: Admitted for ARF, pneumonia, sepsis, KEI, on vent. Patient has metastatic lung cancer. Recently admitted for CVA work up and has been in [employer]. Daily Update: 
Receiving NG suctioning dark brown liquid during visit. Will add order for TF if NG suctioning stopped and pt ready for TF. MAP 70 Abdominal Status (last documented): none documented. Pertinent Medications: prn miralax Drips: fentanyl, diprivan (~5ml/hr) IVF: D5 at 100 ml/hr (~400 kcal/day) Pertinent Labs:  
Lab Results Component Value Date/Time  Sodium 157 (H) 03/12/2021 06:22 PM  
 Potassium 4.3 03/12/2021 06:22 PM  
 Chloride 124 (H) 03/12/2021 06:22 PM  
 CO2 26 03/12/2021 06:22 PM  
 Anion gap 7 03/12/2021 06:22 PM  
 Glucose 284 (H) 03/12/2021 06:22 PM  
  (H) 03/12/2021 06:22 PM Creatinine 2.73 (H) 03/12/2021 06:22 PM  
 Calcium 8.5 03/12/2021 06:22 PM  
 Albumin 2.0 (L) 03/12/2021 11:19 AM  
 Magnesium 2.3 03/05/2021 11:24 PM  
 
Nutrition Related Findings:  
no significant s/s muscle/fat wasting Current Nutrition Therapies: DIET NPO Current Intake: Average Meal Intake: NPO Anthropometric Measures: 
Height: 6' (182.9 cm) Current Body Wt: 93 kg (205 lb 0.4 oz)(3/12/21), Weight source: Not specified BMI: 27.8, Overweight (BMI 25.0-29. 9) Admission Body Weight: 205 lb 0.4 oz Ideal Body Weight (lbs) (Calculated): 178 lbs (81 kg), 115.2 % Usual Body Wt: (gap in wt hx from 2015 to 12/2020. Past 3 months 92-95 kg), Edema: No data recorded Estimated Daily Nutrient Needs: 
Energy (kcal/day): 1056-2888 (Kcal/kg(20-25), Weight Used: Current(93 kg)) Protein (g/day):  Weight Used: (Current(1-1.2)) Fluid (ml/day):   (1 ml/kcal) Nutrition Diagnosis:  
· Inadequate oral intake related to impaired respiratory function as evidenced by NPO or clear liquid status due to medical condition, poor intake prior to admission Nutrition Interventions:  
Food and/or Nutrient Delivery: Start tube feeding Coordination of Nutrition Care: Continue to monitor while inpatient(Consider SLP assessment if patient becomes appropriate for oral intake.) Plan of Care discussed with Kaylie Nevarez RN Goals: Active Goal: Meet at least 75% of estimated needs within 7 days. Nutrition Monitoring and Evaluation:  
  
Food/Nutrient Intake Outcomes: Enteral nutrition intake/tolerance Physical Signs/Symptoms Outcomes: Chewing or swallowing Discharge Planning: Too soon to determine Rich Martinez RD, LD Contact: [phone]

## 2021-03-13 NOTE — ROUTINE PROCESS
Bedside and Verbal report given to self by Marisol Hartley RN. Report included SBAR, Kardex, ED Summary, Procedure Summary, Intake and Output and Cardiac Rhythm SR with 1st degree HB. Fentanyl verified on MAR at bedside with off-going RN.

## 2021-03-13 NOTE — PROGRESS NOTES
Critical Care Daily Progress Note: 3/13/2021 Admission Date: 3/12/2021 The patient's chart is reviewed and the patient is discussed with the staff. Patient is a 68 y.o. male seen and evaluated at the request of Dr. Nereida Castro. He has a PMH of HTN, hypothyroidism, former ETOH and Stage IV adenocarcinoma of the lung. He is a former smoker, quit in 1987. He smoked for 25 years 1.5ppd. He is known to our office after a CT of the chest revealed a R paratracheal mass and R pleural effusion. A thoracentesis was done 12/2020 and revealed malignant cells. A PET scan showed primary R suprahilar and perihilar bronchogenic carcinoma as well as a R adrenal mass and FDG avid mediastinal and paraesophageal metastatic lymphadenopathy. He underwent an addition thoracentesis on 1/7/21 with 2L removed on R side. Adrenal biopsy and additional thoracentesis with 450cc removed on R side on 1/13. He began chemotherapy in January. An MRI was completed and showed extension of a R paraspinal lesion at T5-T6 with extension into spinal canal through neuroforamen. He then began XRT. He recently had an admission on 3/4-3/9 concerning for CVA, CT head and MRI was negative for acute stroke, hemorrhage or metastasis.  
  
Per chart review EMS was called to rehab facility due to patient being tachypneic and unable to speak in full sentences due to SOB. While in route to ER he became lethargic and had decreased respirations and was intubated by EMS. The wife stated the patient has had difficulty swallowing and has not been eating or drinking well for weeks. WBC 16.2 with L shift, Cr elevated at 2.9, Na 159, trop 162.6. ABG with pH 7.4, PCO2 37.2 PO2 112. EKG SR with 1st AVB. SPutum culture and blood cultures obtained in ER. Subjective:  
 
Sedated, reacts minimally to stimulus currently. On Vent 40%, breathing steady, unlabored. COVID PCR still pending, rapid negative.  
 
Current Facility-Administered Medications Medication Dose Route Frequency  propofol (DIPRIVAN) 10 mg/mL infusion  10 mcg/kg/min IntraVENous TITRATE  dexAMETHasone (DECADRON) tablet 4 mg  4 mg Per NG tube Q8H  
 thyroid (Pork) (ARMOUR) tablet 150 mg  150 mg Per NG tube DAILY  amLODIPine (NORVASC) tablet 5 mg  5 mg Per NG tube DAILY  sodium chloride (NS) flush 5-40 mL  5-40 mL IntraVENous Q8H  
 sodium chloride (NS) flush 5-40 mL  5-40 mL IntraVENous PRN  
 acetaminophen (TYLENOL) tablet 650 mg  650 mg Oral Q6H PRN Or  
 acetaminophen (TYLENOL) suppository 650 mg  650 mg Rectal Q6H PRN  polyethylene glycol (MIRALAX) packet 17 g  17 g Oral DAILY PRN  
 ondansetron (ZOFRAN) injection 4 mg  4 mg IntraVENous Q6H PRN  piperacillin-tazobactam (ZOSYN) 4.5 g in 0.9% sodium chloride (MBP/ADV) 100 mL MBP  4.5 g IntraVENous Q8H  
 fentaNYL in normal saline (pf) 25 mcg/mL infusion  0-1.5 mcg/kg/hr IntraVENous TITRATE  sodium chloride (NS) flush 5-10 mL  5-10 mL IntraVENous PRN  
 dextrose 5% infusion  100 mL/hr IntraVENous CONTINUOUS  
 heparin (porcine) injection 5,000 Units  5,000 Units SubCUTAneous Q12H  VANCOMYCIN INFORMATION NOTE   Other PRN  
 NUTRITIONAL SUPPORT ELECTROLYTE PRN ORDERS   Does Not Apply PRN Review of Systems Unobtainable due to patient status. Objective:  
 
Vitals:  
 03/13/21 0545 03/13/21 0600 03/13/21 0615 03/13/21 0630 BP: 100/64 107/65 100/63 95/62 Pulse: 71 69 69 70 Resp: 15 16 16 15 Temp:      
SpO2: 98% 100% 99% 100% Weight:      
Height:      
 
 
Intake/Output Summary (Last 24 hours) at 3/13/2021 8832 Last data filed at 3/13/2021 6149 Gross per 24 hour Intake 2511.26 ml Output 2545 ml Net -33.74 ml Physical Exam:         
Constitutional:  intubated and mechanically ventilated. EENMT:  Sclera clear, pupils equal, oral mucosa moist 
Respiratory: clear Cardiovascular:  RRR with no M,G,R; 
Gastrointestinal:  soft with no tenderness; positive bowel sounds present Musculoskeletal:  warm with no cyanosis, 1+ lower extremity edema Skin:  no jaundice or ecchymosis Neurologic: no gross neuro deficits Psychiatric:  sedated LINES:   
Venous Access Device Upper chest (subclavicular area), left Peripheral IV Right Forearm Peripheral IV 03/12/21 Left Wrist 
Urinary Catheter 03/12/21 Lakhani Nasogastric Tube 03/12/21 DRIPS:   
  dextrose 5% infusion    
  100 mL/hr, IV, CONTINUOUS    
  Last Action:  New Bag, 100 mL/hr at 03/13 0244    
  fentaNYL in normal saline (pf) 25 mcg/mL infusion    
  0-1.5 mcg/kg/hr, IV, TITRATE    
  Last Action:  Rate Verify, 50 mcg/hr at 03/13 3456    
  propofol (DIPRIVAN) 10 mg/mL infusion    
  10 mcg/kg/min, IV, TITRATE    
  Last Action:  New Bag, 10 mcg/kg/min at 03/12 2220 CXR:  Clear, RUL mass, ETT in good position Ventilator Settings Mode FIO2 Rate Tidal Volume Pressure PEEP Assist control, Volume control  40 %    450 ml     8 cm H20 Peak airway pressure: 19 cm H2O Minute ventilation: 7.02 l/min ABG:  
Recent Labs  
  03/13/21 
0421 03/12/21 
1145 PHI 7.39 7.40 PCO2I 37.3 37.2 PO2I 143* 112* HCO3I 22.7 23.2 LAB No results for input(s): GLUCPOC in the last 72 hours. No lab exists for component: [place] Recent Labs  
  03/13/21 
0417 03/12/21 
1119 WBC 11.8* 16.2* HGB 9.7* 10.9* HCT 31.1* 35.4* PLT 82* 111* Recent Labs  
  03/13/21 
0417 03/12/21 
1822 03/12/21 
1119 * 157* 159*  
K 4.3 4.3 5.0  
* 124* 127* CO2 27 26 27 * 284* 218* * 123* 127* CREA 2.63* 2.73* 2.90* CA 8.5 8.5 8.9 ALB  --   --  2.0* Recent Labs  
  03/12/21 
1409 03/12/21 
1119 LAC 1.3 1.3 Patient Active Problem List  
Diagnosis Code  Pleural effusion J90  
 Primary malignant neoplasm of lung metastatic to other site [facility]) C34.90  
 Non-small cell lung cancer ([facility] C34.90  Acute ischemic stroke (HCC) I63.9  Acute respiratory failure (HCC) J96.00  
 HCAP (healthcare-associated pneumonia) J18.9  
 HTN (hypertension) I10  
 Acquired hypothyroidism E03.9  Sepsis ([facility] A41.9  Hypernatremia E87.0  KEI (acute kidney injury) ([facility] N17.9  Mild protein-calorie malnutrition (HCC) E44.1 Assessment:  (Medical Decision Making) Hospital Problems  Date Reviewed: 3/5/2021 Codes Class Noted POA * (Principal) Acute respiratory failure ([facility] ICD-10-CM: J96.00 
ICD-9-CM: 518.81  3/12/2021 Unknown HCAP (healthcare-associated pneumonia) ICD-10-CM: J18.9 ICD-9-CM: 580  3/12/2021 Unknown HTN (hypertension) ICD-10-CM: I10 
ICD-9-CM: 401.9  3/12/2021 Unknown Acquired hypothyroidism ICD-10-CM: E03.9 ICD-9-CM: 244.9  3/12/2021 Unknown Sepsis ([facility] ICD-10-CM: A41.9 ICD-9-CM: 038.9, 995.91  3/12/2021 Unknown Hypernatremia ICD-10-CM: E87.0 ICD-9-CM: 276.0  3/12/2021 Unknown KEI (acute kidney injury) ([facility] ICD-10-CM: N17.9 ICD-9-CM: 584.9  3/12/2021 Unknown Mild protein-calorie malnutrition (HCC) (Chronic) ICD-10-CM: E44.1 ICD-9-CM: 263.1  3/12/2021 Yes Primary malignant neoplasm of lung metastatic to other site [facility]) ICD-10-CM: C34.90 ICD-9-CM: 162.9  12/28/2020 Yes 67 y/o male with stage 4 lung cancer diagnosed 12/2020, started chemoXRT 1/2021 with declining status and recent admission for AMS without clear findings, now admitted initially with report of shortness of breath and difficulty eating/swallowing for weeks who became lethargic and intubated by EMS. Most notably he has severe hypernatremia 159. Plan:  (Medical Decision Making) --continue D5 for hypernatremia, likely from poor PO intake  
--could trial sedation break and PSV, but may remain high risk until sodium is closer to normal 
--no evidence for sepsis, pneumonia or COVID, PCR pending 
--reasonable to continue antibiotics for now 
--need ongoing goals of care discussion with family; patient could potentially briefly improve if sodium is corrected, but without feeding tube and his general failure to thrive this presentation is likely to quickly recur 
--heparin, add H2B for prophy DNR More than 50% of the time documented was spent in face-to-face contact with the patient and in the care of the patient on the floor/unit where the patient is located.  
 
Davey Chaudhry MD

## 2021-03-13 NOTE — PROGRESS NOTES
Gave patient's wife Iram Aguilar an update by telephone. Informed her that with patient on COVID rule out status, no visitors permitted until a negative result is achieved. The nurse was informed by the patient's wife that the patient has had both of his Moderna Covid-19 vaccines. Concerns addressed and patient's wife states no further questions at this time.

## 2021-03-13 NOTE — PROGRESS NOTES
Care Management Interventions Palliative Care Criteria Met (RRAT>21 & CHF Dx)?: No(Risk 20% Dx resp failure) Transition of Care Consult (CM Consult): Discharge Planning Discharge Durable Medical Equipment: No 
Physical Therapy Consult: No 
Occupational Therapy Consult: No 
Speech Therapy Consult: No 
Current Support Network: Lives with Spouse Discharge Location Discharge Placement: Unable to determine at this time Patient was transferred from [facility] by EMS to [facility] ER due to resp failure and currently on vent. Patient was recently at [facility] for work up CVA and d/c 3/9. D/C planning pending clinical progress.

## 2021-03-14 NOTE — PROGRESS NOTES
Ventilator check complete; patient has a #7. 0 ET tube secured at the 23 at the teeth. Patient is not sedated. Patient is able to follow commands. Breath sounds are clear. Trachea is midline, Negative for subcutaneous air, and chest excursion is symmetric. Patient is also Negative for cyanosis and is Positive for pitting edema. All alarms are set and audible. Resuscitation bag is at the head of the bed. Ventilator Settings Mode FIO2 Rate Tidal Volume Pressure PEEP I:E Ratio Pressure support  30 %    450 ml  5 cm H2O  8 cm H20  1:1.8 Peak airway pressure: 14 cm H2O Minute ventilation: 10.8 l/min ABG: No results for input(s): PH, PCO2, PO2, HCO3 in the last 72 hours.  
 
 
Castillo Pabon, RT

## 2021-03-14 NOTE — PROGRESS NOTES
Critical Care Daily Progress Note: 3/14/2021 Admission Date: 3/12/2021 The patient's chart is reviewed and the patient is discussed with the staff. Patient is a 68 y.o. male seen and evaluated at the request of Dr. Malissa Watson. He has a PMH of HTN, hypothyroidism, former ETOH and Stage IV adenocarcinoma of the lung. He is a former smoker, quit in 1987. He smoked for 25 years 1.5ppd. He is known to our office after a CT of the chest revealed a R paratracheal mass and R pleural effusion. A thoracentesis was done 12/2020 and revealed malignant cells. A PET scan showed primary R suprahilar and perihilar bronchogenic carcinoma as well as a R adrenal mass and FDG avid mediastinal and paraesophageal metastatic lymphadenopathy. He underwent an addition thoracentesis on 1/7/21 with 2L removed on R side. Adrenal biopsy and additional thoracentesis with 450cc removed on R side on 1/13. He began chemotherapy in January. An MRI was completed and showed extension of a R paraspinal lesion at T5-T6 with extension into spinal canal through neuroforamen. He then began XRT. He recently had an admission on 3/4-3/9 concerning for CVA, CT head and MRI was negative for acute stroke, hemorrhage or metastasis.  
  
Per chart review EMS was called to rehab facility due to patient being tachypneic and unable to speak in full sentences due to SOB. While in route to ER he became lethargic and had decreased respirations and was intubated by EMS. The wife stated the patient has had difficulty swallowing and has not been eating or drinking well for weeks. WBC 16.2 with L shift, Cr elevated at 2.9, Na 159, trop 162.6. ABG with pH 7.4, PCO2 37.2 PO2 112. EKG SR with 1st AVB. SPutum culture and blood cultures obtained in ER. Subjective:  
 
Sedated, reacts minimally to stimulus currently. On Vent 30%, breathing steady, unlabored. COVID PCR still pending, rapid negative. Opened eyes weakly.  Placed on PSV when I was in room and 5/8 30% and Vt 450-500, RR 20. Current Facility-Administered Medications Medication Dose Route Frequency  famotidine (PF) (PEPCID) 20 mg in 0.9% sodium chloride 10 mL injection  20 mg IntraVENous Q12H  
 insulin regular (NOVOLIN R, HUMULIN R) injection   SubCUTAneous Q6H  
 propofol (DIPRIVAN) 10 mg/mL infusion  10 mcg/kg/min IntraVENous TITRATE  dexAMETHasone (DECADRON) tablet 4 mg  4 mg Per NG tube Q8H  
 thyroid (Pork) (ARMOUR) tablet 150 mg  150 mg Per NG tube DAILY  amLODIPine (NORVASC) tablet 5 mg  5 mg Per NG tube DAILY  sodium chloride (NS) flush 5-40 mL  5-40 mL IntraVENous Q8H  
 sodium chloride (NS) flush 5-40 mL  5-40 mL IntraVENous PRN  
 acetaminophen (TYLENOL) tablet 650 mg  650 mg Oral Q6H PRN Or  
 acetaminophen (TYLENOL) suppository 650 mg  650 mg Rectal Q6H PRN  polyethylene glycol (MIRALAX) packet 17 g  17 g Oral DAILY PRN  
 ondansetron (ZOFRAN) injection 4 mg  4 mg IntraVENous Q6H PRN  piperacillin-tazobactam (ZOSYN) 4.5 g in 0.9% sodium chloride (MBP/ADV) 100 mL MBP  4.5 g IntraVENous Q8H  
 fentaNYL in normal saline (pf) 25 mcg/mL infusion  0-1.5 mcg/kg/hr IntraVENous TITRATE  sodium chloride (NS) flush 5-10 mL  5-10 mL IntraVENous PRN  
 dextrose 5% infusion  100 mL/hr IntraVENous CONTINUOUS  
 heparin (porcine) injection 5,000 Units  5,000 Units SubCUTAneous Q12H  VANCOMYCIN INFORMATION NOTE   Other PRN  
 NUTRITIONAL SUPPORT ELECTROLYTE PRN ORDERS   Does Not Apply PRN Review of Systems Unobtainable due to patient status. Objective:  
 
Vitals:  
 03/14/21 0600 03/14/21 0615 03/14/21 0630 03/14/21 0700 BP: (!) 92/54 (!) 95/55  138/75 Pulse: 68 66 65 69 Resp: 15 15 16 18 Temp:    98.5 °F (36.9 °C) SpO2: 97% 97% 97% 98% Weight:      
Height:      
 
 
Intake/Output Summary (Last 24 hours) at 3/14/2021 8554 Last data filed at 3/14/2021 3790 Gross per 24 hour Intake 2889.59 ml Output 1365 ml Net 1524.59 ml Physical Exam: Constitutional:  intubated and mechanically ventilated. EENMT:  Sclera clear, pupils equal, oral mucosa moist 
Respiratory: clear Cardiovascular:  RRR with no M,G,R; 
Gastrointestinal:  soft with no tenderness; positive bowel sounds present Musculoskeletal:  warm with no cyanosis, 1+ lower extremity edema Skin:  no jaundice or ecchymosis Neurologic: no gross neuro deficits Psychiatric:  sedated LINES:   
Venous Access Device Upper chest (subclavicular area), left Peripheral IV Right Forearm Peripheral IV 03/12/21 Left Wrist 
Urinary Catheter 03/12/21 Lakhani Nasogastric Tube 03/12/21 DRIPS:   
  dextrose 5% infusion    
  100 mL/hr, IV, CONTINUOUS    
  Last Action:  New Bag, 100 mL/hr at 03/13 0244    
  fentaNYL in normal saline (pf) 25 mcg/mL infusion    
  0-1.5 mcg/kg/hr, IV, TITRATE    
  Last Action:  Rate Verify, 50 mcg/hr at 03/13 1272    
  propofol (DIPRIVAN) 10 mg/mL infusion    
  10 mcg/kg/min, IV, TITRATE    
  Last Action:  New Bag, 10 mcg/kg/min at 03/12 2220 CXR:  Clear, RUL mass, ETT in good position Ventilator Settings Mode FIO2 Rate Tidal Volume Pressure PEEP Assist control, Volume control  30 %    450 ml     8 cm H20 Peak airway pressure: 19 cm H2O Minute ventilation: 7.12 l/min ABG:  
Recent Labs  
  03/13/21 
0421 03/12/21 
1145 PHI 7.39 7.40 PCO2I 37.3 37.2 PO2I 143* 112* HCO3I 22.7 23.2 LAB Recent Labs  
  03/14/21 
0507 03/13/21 
2338 03/13/21 
1718 03/13/21 
1108 03/13/21 
5188 GLUCPOC 220* 212* 284* 355* 361* Recent Labs  
  03/14/21 
0326 03/13/21 
0417 03/12/21 
1119 WBC 12.8* 11.8* 16.2* HGB 9.2* 9.7* 10.9* HCT 28.9* 31.1* 35.4* PLT 71* 82* 111* Recent Labs  
  03/14/21 
0326 03/13/21 
2341 03/13/21 
1809 03/13/21 
0417 03/13/21 
0417 03/12/21 
1822 03/12/21 
1119 * 151* 153*   < > 155* 157* 159*  
K 4.1  --   --   --  4.3 4.3 5.0  
*  --   --   --  122* 124* 127* CO2 25  --   --   --  27 26 27 *  --   --   --  339* 284* 218* *  --   --   --  122* 123* 127* CREA 2.26*  --   --   --  2.63* 2.73* 2.90* CA 8.1*  --   --   --  8.5 8.5 8.9 ALB  --   --   --   --   --   --  2.0*  
 < > = values in this interval not displayed. Recent Labs  
  03/12/21 
1409 03/12/21 
1119 LAC 1.3 1.3 Patient Active Problem List  
Diagnosis Code  Pleural effusion J90  
 Primary malignant neoplasm of lung metastatic to other site [facility]) C34.90  
 Non-small cell lung cancer ([facility] C34.90  Acute ischemic stroke (HCC) I63.9  Acute hypoxemic respiratory failure (HCC) J96.01  
 HCAP (healthcare-associated pneumonia) J18.9  
 HTN (hypertension) I10  
 Acquired hypothyroidism E03.9  Severe sepsis (HCC) A41.9, R65.20  Hypernatremia E87.0  KEI (acute kidney injury) ([facility] N17.9  Mild protein-calorie malnutrition (HCC) E44.1  Gram-positive cocci bacteremia R78.81 Assessment:  (Medical Decision Making) Hospital Problems  Date Reviewed: 3/5/2021 Codes Class Noted POA Gram-positive cocci bacteremia ICD-10-CM: R78.81 ICD-9-CM: 790.7, 041.89  3/13/2021 Yes * (Principal) Acute hypoxemic respiratory failure ([facility] ICD-10-CM: J96.01 
ICD-9-CM: 518.81  3/12/2021 Unknown HCAP (healthcare-associated pneumonia) ICD-10-CM: J18.9 ICD-9-CM: 336  3/12/2021 Yes HTN (hypertension) ICD-10-CM: I10 
ICD-9-CM: 401.9  3/12/2021 Yes Acquired hypothyroidism ICD-10-CM: E03.9 ICD-9-CM: 244.9  3/12/2021 Yes Severe sepsis (HCC) ICD-10-CM: A41.9, R65.20 ICD-9-CM: 038.9, 995.92  3/12/2021 Unknown Hypernatremia ICD-10-CM: E87.0 ICD-9-CM: 276.0  3/12/2021 Yes KEI (acute kidney injury) ([facility] ICD-10-CM: N17.9 ICD-9-CM: 584.9  3/12/2021 Yes Mild protein-calorie malnutrition (HCC) (Chronic) ICD-10-CM: E44.1 ICD-9-CM: 263.1  3/12/2021 Yes  Primary malignant neoplasm of lung metastatic to other site [facility]) ICD-10-CM: C34.90 ICD-9-CM: 162.9  12/28/2020 Yes 67 y/o male with stage 4 lung cancer diagnosed 12/2020, started chemoXRT 1/2021 with declining status and recent admission for AMS without clear findings, now admitted initially with report of shortness of breath and difficulty eating/swallowing for weeks who became lethargic and intubated by EMS. Most notably he has severe hypernatremia 159. Plan:  (Medical Decision Making) --continue D5 for hypernatremia, likely from poor PO intake, improving 
--KEI and UOP improving with D5 
--stop sedation for now. If can wake up and respiratory mechanics appear like they do now could extubate this morning 
--no evidence for sepsis, pneumonia or COVID, PCR pending 
--reasonable to continue antibiotics for now 
--need ongoing goals of care discussion with family; patient could potentially briefly improve if sodium is corrected, but without feeding tube and his general failure to thrive this presentation is likely to quickly recur 
--platelets and Hg dropping, continue to monitor, may need to hold heparin 
--heparin, add H2B for prophy DNR More than 50% of the time documented was spent in face-to-face contact with the patient and in the care of the patient on the floor/unit where the patient is located.  
 
Luis Lake MD

## 2021-03-14 NOTE — PROGRESS NOTES
Hospitalist Progress Note Admit Date:  3/12/2021 10:58 AM  
Name:  Alexandra Homans Age:  68 y.o. 
:  1940 MRN:  [id number] PCP:  Aminta White MD 
Treatment Team: Attending Provider: Ignacio Couch MD; Consulting Provider: Isabel Bolton MD; Primary Nurse: Fahad Schafer Presenting Complaint: Shortness of Breath Hospital Course: Mr. Desirae Naqvi is a 69 y/o WM with a h/o stage 4 lung adenocarcinoma, HTN, HLD and hypothyroid recently admitted for suspected CVA. Discharged on 3/9. EMS called to rehab facility on 3/12 due to respiratory distress. He was intubated in the field and brought here. Labs showed leukocytosis (though recently has been on Decadron), KEI, hypernatremia. CXR showed a developing hazy infiltrate at the left base. Started on antibiotics and sedation. Admitted to ICU. Pulmonology consulted. Started on D5 for hyperNa. His wife has made him DNR should he deteriorate further. CoNS / bottles. Cr improving. 24hr Events/Subjective:  
3/14: Remains intubated but now on PSV, sedation currently off, he opens eyes and follows commands. No other complaints Assessment and Plan:  
 
Hospital Problems as of 3/14/2021 Date Reviewed: 3/5/2021 Codes Class Noted - Resolved POA Gram-positive cocci bacteremia ICD-10-CM: R78.81 ICD-9-CM: 790.7, 041.89  3/13/2021 - Present Yes * (Principal) Acute hypoxemic respiratory failure ([facility] [address] ICD-10-CM: J96.01 
ICD-9-CM: 518.81  3/12/2021 - Present Unknown HCAP (healthcare-associated pneumonia) ICD-10-CM: J18.9 ICD-9-CM: 583  3/12/2021 - Present Yes HTN (hypertension) ICD-10-CM: I10 
ICD-9-CM: 401.9  3/12/2021 - Present Yes Acquired hypothyroidism ICD-10-CM: E03.9 ICD-9-CM: 244.9  3/12/2021 - Present Yes Severe sepsis (HCC) ICD-10-CM: A41.9, R65.20 ICD-9-CM: 038.9, 995.92  3/12/2021 - Present Unknown Hypernatremia ICD-10-CM: E87.0 ICD-9-CM: 276.0  3/12/2021 - Present Yes KEI (acute kidney injury) ([facility] [address] ICD-10-CM: N17.9 ICD-9-CM: 584.9  3/12/2021 - Present Yes Mild protein-calorie malnutrition (HCC) (Chronic) ICD-10-CM: E44.1 ICD-9-CM: 263.1  3/12/2021 - Present Yes Primary malignant neoplasm of lung metastatic to other site [facility]) ICD-10-CM: C34.90 ICD-9-CM: 162.9  12/28/2020 - Present Yes Plan: # Acute hypoxemic respiratory failure - In setting of metastatic lung cancer and possible HAP. Sedation off, following commands, on PSV, hopeful to extubate later today. # Normocytic anemia/thrombocytopenia - No clinical blood loss, con't Pepcid, hold heparin SQ and add SCDs. # Severe sepsis - Present on admission. Leukocytosis + tachy with suspected infiltrate. Sepsis resolved. CoNS 1/4 bottles, likely contaminant so stop vanc, cultures repeated today. Con't Zosyn. # CoNS bacteremia - 1/4 bottles, stop vanc, cultures repeated.  
  
# Hypernatremia - Improving. Likely 2/2 poor PO intake. Con't D5, Na q6h, increase  
  
# KEI 
            - Suspect pre-renal. Con't IVFs. Cr slowly improving. 
  
# Elevated d-dimer - LE US neg for DVT, could be realted to KEI, infection, hypoxia. 
  
# Hypothyroid - Pork thyroid 
  
# Stage 4 lung cancer Other listed chronic conditions stable, continue current management. DC planning: Unclear. Diet:  DIET NPO 
DIET TUBE FEEDING 
DVT ppx: SCDs Objective:  
 
Patient Vitals for the past 24 hrs: 
 Temp Pulse Resp BP SpO2  
03/14/21 0810  91 28  98 % 03/14/21 0802  92 26  97 % 03/14/21 0800  78 20 (!) 140/78 98 % 03/14/21 0700 98.5 °F (36.9 °C) 69 18 138/75 98 % 03/14/21 0630  65 16  97 % 03/14/21 0615  66 15 (!) 95/55 97 % 03/14/21 0600  68 15 (!) 92/54 97 % 03/14/21 0545  70 15 (!) 103/58 97 % 03/14/21 0530  71 15 (!) 105/59 97 % 03/14/21 0515  81 20 103/78 97 % 03/14/21 0500  81 16 127/76 97 % 03/14/21 0445  72 15 105/62 96 % 03/14/21 0430  74 15 124/73 97 % 03/14/21 0415  71 16 108/65 97 % 03/14/21 0400 98.4 °F (36.9 °C) 69 15 97/63 97 % 03/14/21 0345  69 16 109/66 97 % 03/14/21 0330  71 16 113/67 97 % 03/14/21 0315  69 16 101/63 97 % 03/14/21 0300  69 16 97/60 97 % 03/14/21 0055  70 16  99 % 03/14/21 0000  77 20 115/77 97 % 03/13/21 2345  69 16 98/63 97 % 03/13/21 2330 97.8 °F (36.6 °C) 69 16 102/60 96 % 03/13/21 2315  70 16 112/69 97 % 03/13/21 2300  68 16 96/61 97 % 03/13/21 2245  68 16 95/60 97 % 03/13/21 2230  70 15 92/63 97 % 03/13/21 2215  70 16 99/61 97 % 03/13/21 2200  70 15 98/64 97 % 03/13/21 2145  71 16 101/62 97 % 03/13/21 2130  76 15 123/72 97 % 03/13/21 2115  68 16 104/65 97 % 03/13/21 2107  70 16 (!) 95/59 97 % 03/13/21 2102  69 16  95 % 03/13/21 2045  69 16 (!) 97/58 96 % 03/13/21 2036  69 16 (!) 91/57 96 % 03/13/21 2015  72 16 (!) 85/58 96 % 03/13/21 2000  71 16 (!) 91/57 96 % 03/13/21 1945  73 15 (!) 87/55 96 % 03/13/21 1930 97.4 °F (36.3 °C) 74 16 98/61 96 % 03/13/21 1915  78 17 119/66 95 % 03/13/21 1900  77 17 123/70 98 % 03/13/21 1845  69 15 (!) 122/59 98 % 03/13/21 1830  70 17 114/65 98 % 03/13/21 1817  68 15 (!) 90/53 98 % 03/13/21 1805  64 15 100/61 98 % 03/13/21 1800  64 16 (!) 91/50 98 % 03/13/21 1745  66 16 (!) 92/52 98 % 03/13/21 1730  67 15 (!) 94/55 98 % 03/13/21 1715  67 16 99/62 97 % 03/13/21 1700  66 16 100/61 97 % 03/13/21 1645  69 15 (!) 96/59 96 % 03/13/21 1630  68 15 106/61 97 % 03/13/21 1615  70 15 99/62 97 % 03/13/21 1612  73 13  96 % 03/13/21 1600  73 15 104/65 95 % 03/13/21 1545 96.8 °F (36 °C) 74 23 123/66 98 % 03/13/21 1530  70 16 (!) 103/59 98 % 03/13/21 1515  71 16 (!) 103/57 98 % 03/13/21 1500  71 16 (!) 110/58 98 % 03/13/21 1445  73 17 (!) 105/57 98 % 03/13/21 1430  77 17 118/63 98 % 03/13/21 1416    112/70 97 % 03/13/21 1400  80 24 (!) 141/82 98 % 03/13/21 1345  74 17 112/66 97 % 03/13/21 1330  75 15 116/68 98 % 03/13/21 1315  76 16 125/72 98 % 03/13/21 1300  76 15 134/75 98 % 03/13/21 1245  68 16 105/68 98 % 03/13/21 1230  68 15 105/64 98 % 03/13/21 1215  66 15 103/65 98 % 03/13/21 1200  68 15 100/66 98 % 03/13/21 1158  68 15  98 % 03/13/21 1145  68 15 108/68 98 % 03/13/21 1130  73 16 119/73 98 % 03/13/21 1115  71 12 115/73 99 % 03/13/21 1110 97.1 °F (36.2 °C) 70 22 105/70 99 % 03/13/21 1100  65 15 (!) 100/57 98 % 03/13/21 1045  67 15 (!) 93/57 98 % 03/13/21 1030  68 16 (!) 99/58 98 % 03/13/21 1015  70 15 (!) 97/57 99 % 03/13/21 1000  70 16 104/60 99 % Oxygen Therapy O2 Sat (%): 98 % (03/14/21 0810) Pulse via Oximetry: 66 beats per minute (03/14/21 0630) O2 Device: Endotracheal tube;Ventilator (03/14/21 0400) Skin Assessment: Clean, dry, & intact (03/14/21 0802) Skin Protection for O2 Device: N/A (03/13/21 0400) O2 Flow Rate (L/min): 16 l/min (03/12/21 1237) FIO2 (%): 30 % (03/14/21 0802) Estimated body mass index is 26.61 kg/m² as calculated from the following: 
  Height as of this encounter: 6' (1.829 m). Weight as of this encounter: 89 kg (196 lb 3.2 oz). Intake/Output Summary (Last 24 hours) at 3/14/2021 0957 Last data filed at 3/14/2021 9853 Gross per 24 hour Intake 2809.59 ml Output 1180 ml Net 1629.59 ml  
   
*Note that automatically entered I/Os may not be accurate; dependent on patient compliance with collection and accurate  by techs. General:    Intubated, sedation off, opens eyes to voice and follows simple commands. CV:   RRR. No m/r/g. No edema. No JVD Lungs:   Clear anteriorly. Intubated, on PSV. Abdomen:   Soft, nontender, nondistended. Extremities: Warm and dry. No cyanosis. B/l stasis dermatitis, 1-2+ pitting edema. Skin:     No rashes. Normal coloration Neuro:  Intubated but sedation is off. Opens eyes and will wiggle toes and squeeze hands on command. Data Reviewed: 
I have reviewed all labs, meds, and studies from the last 24 hours: 
Recent Results (from the past 24 hour(s)) GLUCOSE, POC Collection Time: 03/13/21 11:08 AM  
Result Value Ref Range Glucose (POC) 355 (H) 65 - 100 mg/dL SODIUM Collection Time: 03/13/21 12:34 PM  
Result Value Ref Range Sodium 154 (H) 136 - 145 mmol/L  
VANCOMYCIN, RANDOM Collection Time: 03/13/21 12:34 PM  
Result Value Ref Range Vancomycin, random 20.3 UG/ML  
GLUCOSE, POC Collection Time: 03/13/21  5:18 PM  
Result Value Ref Range Glucose (POC) 284 (H) 65 - 100 mg/dL SODIUM Collection Time: 03/13/21  6:09 PM  
Result Value Ref Range Sodium 153 (H) 136 - 145 mmol/L  
GLUCOSE, POC Collection Time: 03/13/21 11:38 PM  
Result Value Ref Range Glucose (POC) 212 (H) 65 - 100 mg/dL SODIUM Collection Time: 03/13/21 11:41 PM  
Result Value Ref Range Sodium 151 (H) 136 - 145 mmol/L METABOLIC PANEL, BASIC Collection Time: 03/14/21  3:26 AM  
Result Value Ref Range Sodium 152 (H) 136 - 145 mmol/L Potassium 4.1 3.5 - 5.1 mmol/L Chloride 121 (H) 98 - 107 mmol/L  
 CO2 25 21 - 32 mmol/L Anion gap 6 (L) 7 - 16 mmol/L Glucose 191 (H) 65 - 100 mg/dL  (H) 8 - 23 MG/DL Creatinine 2.26 (H) 0.8 - 1.5 MG/DL  
 GFR est AA 36 (L) >60 ml/min/1.73m2 GFR est non-AA 30 (L) >60 ml/min/1.73m2 Calcium 8.1 (L) 8.3 - 10.4 MG/DL  
CBC WITH AUTOMATED DIFF Collection Time: 03/14/21  3:26 AM  
Result Value Ref Range WBC 12.8 (H) 4.3 - 11.1 K/uL  
 RBC 3.15 (L) 4.23 - 5.6 M/uL HGB 9.2 (L) 13.6 - 17.2 g/dL HCT 28.9 (L) 41.1 - 50.3 % MCV 91.7 79.6 - 97.8 FL  
 MCH 29.2 26.1 - 32.9 PG  
 MCHC 31.8 31.4 - 35.0 g/dL  
 RDW 19.0 (H) 11.9 - 14.6 % PLATELET 71 (L) 849 - 450 K/uL MPV 11.8 9.4 - 12.3 FL ABSOLUTE NRBC 0.07 0.0 - 0.2 K/uL  
 DF AUTOMATED NEUTROPHILS 93 (H) 43 - 78 %  LYMPHOCYTES 2 (L) 13 - 44 % MONOCYTES 4 4.0 - 12.0 % EOSINOPHILS 0 (L) 0.5 - 7.8 % BASOPHILS 0 0.0 - 2.0 % IMMATURE GRANULOCYTES 2 0.0 - 5.0 %  
 ABS. NEUTROPHILS 11.8 (H) 1.7 - 8.2 K/UL  
 ABS. LYMPHOCYTES 0.2 (L) 0.5 - 4.6 K/UL  
 ABS. MONOCYTES 0.5 0.1 - 1.3 K/UL  
 ABS. EOSINOPHILS 0.0 0.0 - 0.8 K/UL  
 ABS. BASOPHILS 0.0 0.0 - 0.2 K/UL  
 ABS. IMM. GRANS. 0.2 0.0 - 0.5 K/UL GLUCOSE, POC Collection Time: 03/14/21  5:07 AM  
Result Value Ref Range Glucose (POC) 220 (H) 65 - 100 mg/dL Current Meds: 
Current Facility-Administered Medications Medication Dose Route Frequency  famotidine (PF) (PEPCID) 20 mg in 0.9% sodium chloride 10 mL injection  20 mg IntraVENous Q12H  
 insulin regular (NOVOLIN R, HUMULIN R) injection   SubCUTAneous Q6H  
 propofol (DIPRIVAN) 10 mg/mL infusion  10 mcg/kg/min IntraVENous TITRATE  dexAMETHasone (DECADRON) tablet 4 mg  4 mg Per NG tube Q8H  
 thyroid (Pork) (ARMOUR) tablet 150 mg  150 mg Per NG tube DAILY  amLODIPine (NORVASC) tablet 5 mg  5 mg Per NG tube DAILY  sodium chloride (NS) flush 5-40 mL  5-40 mL IntraVENous Q8H  
 sodium chloride (NS) flush 5-40 mL  5-40 mL IntraVENous PRN  
 acetaminophen (TYLENOL) tablet 650 mg  650 mg Oral Q6H PRN Or  
 acetaminophen (TYLENOL) suppository 650 mg  650 mg Rectal Q6H PRN  polyethylene glycol (MIRALAX) packet 17 g  17 g Oral DAILY PRN  
 ondansetron (ZOFRAN) injection 4 mg  4 mg IntraVENous Q6H PRN  piperacillin-tazobactam (ZOSYN) 4.5 g in 0.9% sodium chloride (MBP/ADV) 100 mL MBP  4.5 g IntraVENous Q8H  
 fentaNYL in normal saline (pf) 25 mcg/mL infusion  0-1.5 mcg/kg/hr IntraVENous TITRATE  sodium chloride (NS) flush 5-10 mL  5-10 mL IntraVENous PRN  
 dextrose 5% infusion  125 mL/hr IntraVENous CONTINUOUS  
 [Held by provider] heparin (porcine) injection 5,000 Units  5,000 Units SubCUTAneous Q12H  
 NUTRITIONAL SUPPORT ELECTROLYTE PRN ORDERS   Does Not Apply PRN Other Studies: No results found for this visit on 03/12/21. No results found. All Micro Results Procedure Component Value Units Date/Time CULTURE, RESPIRATORY/SPUTUM/BRONCH George Nicolas [[id number]] Collected: 03/13/21 0526 Order Status: Completed Specimen: Sputum Updated: 03/14/21 1928 Special Requests: NO SPECIAL REQUESTS     
  GRAM STAIN 0 TO 30 WBC'S SEEN PER OIF  
   NO EPITHELIAL CELLS SEEN     
      
  MODERATE GRAM POSITIVE RODS  
     
   FEW GRAM POSITIVE COCCI     
   FEW YEAST FEW GRAM NEGATIVE RODS 4+ MUCUS PRESENT Culture result:    
  LIGHT NORMAL RESPIRATORY JANINA  
     
 CULTURE, BLOOD [[id number]] Collected: 03/12/21 1219 Order Status: Completed Specimen: Blood Updated: 03/14/21 3110 Special Requests: --     
  LEFT 
PORT Culture result: NO GROWTH 2 DAYS     
 CULTURE, BLOOD [[id number]]  (Abnormal) Collected: 03/12/21 1117 Order Status: Completed Specimen: Blood Updated: 03/14/21 5408 Special Requests: NO SPECIAL REQUESTS     
  GRAM STAIN GRAM POSITIVE COCCI AEROBIC AND ANAEROBIC BOTTLES RESULTS VERIFIED, PHONED TO AND READ BACK BY  ON 3/13/21 @ 0810 BY   
     
  Culture result: STAPHYLOCOCCUS SPECIES, COAGULASE NEGATIVE THIS ORGANISM MAY BE INDICATIVE OF CULTURE CONTAMINATION, HOWEVER, CLINICAL CORRELATION NEEDS TO BE EVALUATED, AS EACH CASE IS UNIQUE. Refer to Blood Culture ID Panel Accession [id number] CULTURE, BLOOD [[id number]] Collected: 03/14/21 0326 Order Status: Completed Specimen: Blood Updated: 03/14/21 0331 CULTURE, BLOOD [[id number]] Collected: 03/14/21 0320 Order Status: Completed Specimen: Blood Updated: 03/14/21 0331 BLOOD CULTURE ID PANEL [[id number]]  (Abnormal) Collected: 03/12/21 1017 Order Status: Completed Specimen: Blood Updated: 03/13/21 1320 Acc. no. from [employer] [id number] Staphylococcus Detected   Comment: RESULTS VERIFIED, PHONED TO AND READ BACK BY 
Tana Benson, RN @ 8419 ON 3/13/21 BY AMM 
  
  
  mecA (Methicillin-Resistance Genes) Detected Comment: Presence of mecA is highly indicative of methicillin resistance. The test does not replace traditional culture and susceptibilities INTERPRETATION Gram positive cocci in clusters. Identified by realtime PCR as  Coagulase negative Staphylococci Comment: A single positive culture of coagulase negative Staph is likely to be a contaminant in adult patients. Consider discontinuation of antibiotics for gram positive bloodstream infections if patient asymptomatic. THIS TEST DOES NOT REPLACE SENSITIVITY TESTING. CULTURE, RESPIRATORY/SPUTUM/BRONCH George Nicolas [[id number]] Collected: 03/13/21 0426 Order Status: Canceled Specimen: Sputum COVID-19 RAPID TEST [[id number]] Collected: 03/12/21 2120 Order Status: Completed Specimen: Nasopharyngeal Updated: 03/12/21 2154 Specimen source Nasopharyngeal     
  COVID-19 rapid test Not detected Comment:     
The specimen is NEGATIVE for SARS-CoV-2, the novel coronavirus associated with COVID-19. A negative result does not rule out COVID-19. This test has been authorized by the FDA under an Emergency Use Authorization (EUA) for use by authorized laboratories. Fact sheet for Healthcare Providers: ConventionUpdate.co.nz Fact sheet for Patients: [employer]Update[employer]nz Methodology: Isothermal Nucleic Acid Amplification SARS-CoV-2 Lab Results \"Novel Coronavirus\" Test: No results found for: COV2NT \"Emergent Disease\" Test: No results found for: EDPR \"SARS-COV-2\" Test: No results found for: XGCOVT Rapid Test:  
Lab Results Component Value Date/Time  COVR Not detected 03/12/2021 09:20 PM  
 
  
 
 
Signed: 
Macario Logan MD

## 2021-03-14 NOTE — PROGRESS NOTES
Ventilator check complete; patient has a #7. 0 ET tube secured at the 24 at the lip. Breath sounds are diminished. Trachea is midline, Negative for subcutaneous air, and chest excursion is symmetric. Patient is also Negative for cyanosis. All alarms are set and audible. Resuscitation bag is at the head of the bed. Ventilator Settings Mode FIO2 Rate Tidal Volume Pressure PEEP I:E Ratio Assist control, Volume control  30 %    450 ml     8 cm H20  1:1.8 Peak airway pressure: 19 cm H2O Minute ventilation: 7.11 l/min ABG: No results for input(s): PH, PCO2, PO2, HCO3 in the last 72 hours. Maren Lind

## 2021-03-14 NOTE — ROUTINE PROCESS
Bedside and Verbal report given to Rose Jackson RN by self. Report included SBAR, Kardex, ED summary, procedure summary, recent results and cardiac rhythm NSR with 1st degree HB. Fentanyl verified on MAR at bedside with on-coming RN.

## 2021-03-14 NOTE — PROGRESS NOTES
Problem: Ventilator Management Goal: *Adequate oxygenation and ventilation Outcome: Progressing Towards Goal 
Goal: *Patient maintains clear airway/free of aspiration Outcome: Progressing Towards Goal 
Goal: *Absence of infection signs and symptoms Outcome: Progressing Towards Goal 
  
Problem: Falls - Risk of 
Goal: *Absence of Falls Description: Document Cyn Nguyen Fall Risk and appropriate interventions in the flowsheet. Outcome: Progressing Towards Goal 
Note: Fall Risk Interventions: 
 
Medication Interventions: Bed/chair exit alarm Elimination Interventions: Bed/chair exit alarm, Call light in reach Problem: Pressure Injury - Risk of 
Goal: *Prevention of pressure injury Description: Document Antelmo Scale and appropriate interventions in the flowsheet. Outcome: Progressing Towards Goal 
Note: Pressure Injury Interventions: 
Sensory Interventions: Assess changes in LOC, Assess need for specialty bed, Check visual cues for pain, Float heels, Keep linens dry and wrinkle-free, Maintain/enhance activity level, Minimize linen layers, Turn and reposition approx. every two hours (pillows and wedges if needed) Moisture Interventions: Absorbent underpads, Check for incontinence Q2 hours and as needed, Internal/External urinary devices, Limit adult briefs Activity Interventions: Assess need for specialty bed Mobility Interventions: Assess need for specialty bed, Float heels, Turn and reposition approx. every two hours(pillow and wedges) Nutrition Interventions: Discuss nutritional consult with provider Friction and Shear Interventions: Apply protective barrier, creams and emollients, Feet elevated on foot rest, Foam dressings/transparent film/skin sealants, HOB 30 degrees or less, Lift sheet, Lift team/patient mobility team, Minimize layers, Transfer aides:transfer board/Jerri lift/ceiling lift

## 2021-03-14 NOTE — PROGRESS NOTES
Respiratory Mechanics completed and are as follows: 
Weaning Parameters Spontaneous Breathing Trial Complete: Yes Resp Rate Observed: 24 Ve: 10 VT: 440 RSBI: 54 Huff Agitation Sedation Scale (RASS): Drowsy Patient extubated to a 3L NC. Patient is able to communicate and is negative for stridor. Breath sounds are clear and diminished. No complications with extubation.   
 
Osmar Husbands, RT

## 2021-03-15 PROBLEM — E03.9 ACQUIRED HYPOTHYROIDISM: Chronic | Status: ACTIVE | Noted: 2021-01-01

## 2021-03-15 PROBLEM — I10 HTN (HYPERTENSION): Chronic | Status: ACTIVE | Noted: 2021-01-01

## 2021-03-15 NOTE — PROGRESS NOTES
Critical Care Daily Progress Note: 3/15/2021 Ashlee Graff Admission Date: 3/12/2021 The patient's chart is reviewed and the patient is discussed with the staff. 68 y.o. male seen and evaluated at the request of Dr. Nallely Saleh. He has a PMH of HTN, hypothyroidism, former ETOH and Stage IV adenocarcinoma of the lung. He is a former smoker, quit in 1987. He smoked for 25 years 1.5ppd. He is known to our office after a CT of the chest revealed a R paratracheal mass and R pleural effusion. A thoracentesis was done 12/2020 and revealed malignant cells. A PET scan showed primary R suprahilar and perihilar bronchogenic carcinoma as well as a R adrenal mass and FDG avid mediastinal and paraesophageal metastatic lymphadenopathy. He underwent an addition thoracentesis on 1/7/21 with 2L removed on R side. Adrenal biopsy and additional thoracentesis with 450cc removed on R side on 1/13. He began chemotherapy in January. An MRI was completed and showed extension of a R paraspinal lesion at T5-T6 with extension into spinal canal through neuroforamen. He then began XRT. He recently had an admission on 3/4-3/9 concerning for CVA, CT head and MRI was negative for acute stroke, hemorrhage or metastasis. He has now been admitted afte EMS was called to rehab facility due to patient being tachypneic and unable to speak in full sentences. While in route to ER he became lethargic and had decreased respirations and was intubated by EMS. The wife stated the patient has had difficulty swallowing and has not been eating or drinking well for weeks. WBC 16.2 with L shift, Cr elevated at 2.9, Na 159, trop 162.6. ABG with pH 7.4, PCO2 37.2 PO2 112. EKG SR with 1st AVB. He was extubated on 3/14. ST has been consulted. Blood cultures are negative. UA is + but no culture done. D5W for hyperglycemia. Heparin on hold due to drop in platelets. Duplex of legs negative. Chest CT with GGO in RLL. Subjective:  
  Voice weak and barely audible. Slow to answer questions and confused but no disoriented. Too weak to sit forward. Current Facility-Administered Medications Medication Dose Route Frequency  famotidine (PF) (PEPCID) 20 mg in 0.9% sodium chloride 10 mL injection  20 mg IntraVENous Q12H  
 insulin regular (NOVOLIN R, HUMULIN R) injection   SubCUTAneous Q6H  
 dexAMETHasone (DECADRON) tablet 4 mg  4 mg Per NG tube Q8H  
 thyroid (Pork) (ARMOUR) tablet 150 mg  150 mg Per NG tube DAILY  amLODIPine (NORVASC) tablet 5 mg  5 mg Per NG tube DAILY  sodium chloride (NS) flush 5-40 mL  5-40 mL IntraVENous Q8H  
 sodium chloride (NS) flush 5-40 mL  5-40 mL IntraVENous PRN  
 acetaminophen (TYLENOL) tablet 650 mg  650 mg Oral Q6H PRN Or  
 acetaminophen (TYLENOL) suppository 650 mg  650 mg Rectal Q6H PRN  polyethylene glycol (MIRALAX) packet 17 g  17 g Oral DAILY PRN  
 ondansetron (ZOFRAN) injection 4 mg  4 mg IntraVENous Q6H PRN  piperacillin-tazobactam (ZOSYN) 4.5 g in 0.9% sodium chloride (MBP/ADV) 100 mL MBP  4.5 g IntraVENous Q8H  
 sodium chloride (NS) flush 5-10 mL  5-10 mL IntraVENous PRN  
 dextrose 5% infusion  75 mL/hr IntraVENous CONTINUOUS  
 [Held by provider] heparin (porcine) injection 5,000 Units  5,000 Units SubCUTAneous Q12H  
 NUTRITIONAL SUPPORT ELECTROLYTE PRN ORDERS   Does Not Apply PRN Objective:  
 
Vitals:  
 03/15/21 0500 03/15/21 0537 03/15/21 0600 03/15/21 0700 BP: 139/72  121/64 135/72 Pulse: 75  77 71 Resp: 19  23 19 Temp:    97.4 °F (36.3 °C) SpO2: 100%  100% 100% Weight:  195 lb 11.2 oz (88.8 kg) Height:      
 
 
Intake and Output:  
03/13 1901 - 03/15 0700 In: 4755.6 [I.V.:4615.6] Out: 3140 [DHSMS:2785] No intake/output data recorded. Physical Exam:         
CONSTITUTIONAL:  the patient is well developed and in no acute distress HEENT:  Sclera clear, pupils equal, oral mucosa moist 
RESPIRATORY: clear but decreased on the left. Now on room air CARDIOVASCULAR  RRR without M,G,R 
ABD/GI: soft and non-tender; with positive bowel sounds. EXT: warm without cyanosis. There is 2= lower leg edema in the left leg. SKIN:  no jaundice or rashes, no wounds NEURO: slow mentation. Able to answer some questions but confused. MUSCULOSKELETAL: moves all four extremities with equal strengthn but he is very weak. No deformities ROS: Review of Systems - unable to obtain due to weak voice and confusion LINES: port, rodgers DRIPS:   D5W 
 
CHEST XRAY:  
 
CT: 1. Right suprahilar 4.4 cm mass concerning for primary malignancy with numerous 
subpleural and pleural nodules of the right hemithorax indicative of pleural 
metastatic disease. Additionally there are bilateral solid appearing adrenal 
nodule suspicious for adrenal metastases, unchanged. 2. Secretions are evident within the trachea with tree-in-bud groundglass 
opacities of the right lower lobe superior segment, likely aspiration pneumonia. 
  
LAB Recent Labs  
  03/15/21 
0446 03/14/21 
0326 03/13/21 
0417 WBC 13.1* 12.8* 11.8* HGB 8.9* 9.2* 9.7* HCT 27.4* 28.9* 31.1*  
PLT 80* 71* 82* Recent Labs  
  03/15/21 
0446 03/14/21 
2320 03/14/21 
1625 03/14/21 
0326 03/14/21 
0326 03/13/21 
0417 03/13/21 
0417 03/12/21 
1119 03/12/21 
1119 * 147* 147*   < > 152*   < > 155*   < > 159*  
K 3.5  --   --   --  4.1  --  4.3   < > 5.0 *  --   --   --  121*  --  122*   < > 127* CO2 26  --   --   --  25  --  27   < > 27 *  --   --   --  191*  --  339*   < > 218* BUN 75*  --   --   --  111*  --  122*   < > 127* CREA 1.62*  --   --   --  2.26*  --  2.63*   < > 2.90* MG  --  2.6*  --   --   --   --   --   --   --   
PHOS  --  4.4*  --   --   --   --   --   --   --   
CA 7.9*  --   --   --  8.1*  --  8.5   < > 8.9 ALB  --   --   --   --   --   --   --   --  2.0*  
 < > = values in this interval not displayed.   
 
 
Assessment:  (Medical Decision Making) Hospital Problems  Date Reviewed: 3/5/2021 Codes Class Noted POA Gram-positive cocci bacteremia ICD-10-CM: R78.81 ICD-9-CM: 790.7, 041.89  3/13/2021 Yes 1 of 4 - staph - suspect contaminant * (Principal) Acute hypoxemic respiratory failure ([facility] ICD-10-CM: J96.01 
ICD-9-CM: 518.81  3/12/2021 Yes Successful extubation yesterday HCAP (healthcare-associated pneumonia) ICD-10-CM: J18.9 ICD-9-CM: 022  3/12/2021 Yes  
 ? aspiration HTN (hypertension) (Chronic) ICD-10-CM: I10 
ICD-9-CM: 401.9  3/12/2021 Yes  
 controlled Acquired hypothyroidism (Chronic) ICD-10-CM: E03.9 ICD-9-CM: 244.9  3/12/2021 Yes On Tx Severe sepsis (HCC) ICD-10-CM: A41.9, R65.20 ICD-9-CM: 038.9, 995.92  3/12/2021 Unknown Per hospitalist  
 Hypernatremia ICD-10-CM: E87.0 ICD-9-CM: 276.0  3/12/2021 Yes Persistent - on D5W - suspect volume depletion KEI (acute kidney injury) ([facility] ICD-10-CM: N17.9 ICD-9-CM: 584.9  3/12/2021 Yes Improving with hydration Mild protein-calorie malnutrition (HCC) (Chronic) ICD-10-CM: E44.1 ICD-9-CM: 263.1  3/12/2021 Yes Wife reported trouble eating at rehab Non-small cell lung cancer (HCC) ICD-10-CM: C34.90 ICD-9-CM: 162.9  1/12/2021 Yes Outpatient chemo and XRT Primary malignant neoplasm of lung metastatic to other site [facility]) ICD-10-CM: C34.90 ICD-9-CM: 162.9  12/28/2020 Yes  
 adenoca Plan:  (Medical Decision Making) 1. Successful extubation yesterday. Now on room air 2. Avoid sedatives - slow to respond 3. ST to see - ? Dysphagia 4. Continue D5W for hypernatremia - Na and renal function improving 5. Remove rodgers. + UA but no culture done. Day 4 zosyn. Also with ground glass opacities RLL - suspect aspiration pneumonia. 7 day course abx planned. COVID negative. 6. Consult PT/OT - will need to go back to rehab when medically ready 7. Heparin on hold due to drop in platelets. Today's count is 80. Using SCD hose. Duplex of legs negative 8. On Decadron - was an oupatient medication 9. SSI for hyperglycemia 10. OK to transfer to the floor 11. DNR Pina Arguelles NP Lungs:  clear Heart:  RRR with no Murmur/Rubs/Gallops Additional Comments:  Can move to the floor, now off O2 I have spoken with and examined the patient. I agree with the above assessment and plan as documented. Akosua Castellanos MD 
 
 
More than 50% of time documented was spent face-to-face contact with the patient and in the care of the patient on the floor/unit where the patient is located

## 2021-03-15 NOTE — PROGRESS NOTES
LTG: Patient will tolerate least restrictive diet without overt signs or symptoms of airway compromise. STG: Patient will participate in modified barium swallow study as clinically indicated. SPEECH LANGUAGE PATHOLOGY: DYSPHAGIA- Initial Assessment NAME/AGE/GENDER: Daniella Ro is a 68 y.o. male DATE: 3/15/2021 PRIMARY DIAGNOSIS: Acute respiratory failure ([facility] [J96.00] HCAP (healthcare-associated pneumonia) [J18.9] ICD-10: Treatment Diagnosis: R13.12 Dysphagia, Oropharyngeal Phase RECOMMENDATIONS  
DIET:  
 NPO Can have 2-3 ice chips per hour for pleasure after oral care with supervision from nursing staff MEDICATIONS: Non-oral 
  
ASPIRATION PRECAUTIONS 
· Oral care COMPENSATORY STRATEGIES/MODIFICATIONS · None RECOMMENDATIONS for CONTINUED SPEECH THERAPY:  
YES: Anticipate need for ongoing speech therapy during this hospitalization. ASSESSMENT Patient presents with oropharyngeal dysphagia. Intubated 3/12- 1/14. Mild oral holding and multiple swallows upon palpation with thin liquids and puree trials concerning for poor pharyngeal clearance. Prior modified barium swallow study completed 3/5/21 revealed piecemeal swallows of all consistencies, but no aspiration/penetration occurred with thin liquids by single cup sips, pudding, mixed, or solid trials. Concerns for possible aspiration on chest x-ray this hospitalization. Recommend continue NPO. Meds non-oral. Can have 2-3 ice chips per hour for pleasure after oral cave with supervision from nursing staff. Modified barium swallow study tomorrow to objectively assess oropharyngeal swallow function before initiating oral diet.    
 
 
EDUCATION: 
· Recommendations discussed with Patient, MD and RN 
CONTINUATION OF SKILLED SERVICES/MEDICAL NECESSITY: 
 Patient is expected to demonstrate progress in  swallow strength, swallow timeliness, swallow function, diet tolerance and swallow safety in order to  improve swallow safety, work toward diet advancement and decrease aspiration risk.  Patient continues to require skilled intervention due to dysphagia. REHABILITATION POTENTIAL FOR STATED GOALS: Good PLAN   
FREQUENCY/DURATION: Continue to follow patient 3 times a week for duration of hospital stay to address above goals. - Recommendations for next treatment session: Next treatment session will address Modified Barium Swallow Study SUBJECTIVE Patient alert in bed for assessment. Limited verbalizations and command following. Oxygen Device: Room air. Was intubated 3/12 -3/14 Pain: Pain Scale 1: Numeric (0 - 10) Pain Intensity 1: 0 History of Present Injury/Illness: Mr. Haylee Garg  has a past medical history of Acute ischemic stroke ([facility] (3/4/2021), Hypercholesterolemia, Hypertension, Morbid obesity ([facility] (11/11/15), Pleural effusion (12/14/2020), Primary malignant neoplasm of lung metastatic to other site [facility]) (12/28/2020), and Thyroid disease. Em Jay He also  has a past surgical history that includes hx other surgical; hx colonoscopy; hx heent; hx hernia repair (2015); hx orthopaedic (Left); and ir insert tunl cvc w port over 5 years (1/7/2021). PRECAUTIONS/ALLERGIES: Patient has no known allergies. Problem List:  (Impairments causing functional limitations): 1. Oropharyngeal dysphagia Previous Dysphagia: YES patient had modified barium swallow study completed 3/5/21 during prior hospitalization with results as follows: 
\"Mr. Haylee Garg presents with moderate oral and mild pharyngeal dysphagia exacerbated by altered mentation. Adequate oral containment with all trials as evidenced by no anterior bolus loss, which was observed during bedside swallow evaluation earlier today. Mild oral holding with patient \"swishing\" liquids in oral cavity prior to swallow. Piecemeal swallows with all consistencies resulting in trace non-clearing penetration with thin by straws and consecutive sips of thin by cup.  No aspiration/penetration occurred with thin by single cup sips, pudding, mixed, or solid consistency trials. Min-mild residue on base of tongue post swallow with all consistencies due to reduced tongue base retraction.  
  
Recommend initiate mechanical soft diet/chopped meats &vegetables and thin liquids by small single cups sips. No straws. Meds floated in puree or crushed in puree (if cleared by pharmacy). Needs 1:1 assistance with all po intake to ensure use of safe swallowing precaution. Will continue to follow for diet tolerance and patient/caregiver education. \" 
  
 
 
Diet Prior to Evaluation: NPO Orientation: 
Oriented to name via yes/no questions Cognitive-Linguistic Screening:  Alertness 
o Drowsy, but arousable with minimal cues  Speech Production:  
o Speech slurred at times. Minimally verbal 
 Expressive Language: o Minimally verbal 
 Receptive Language: 
o Responding to some yes/no questions. Limited command following.  Cognition:  
o Impaired Prior Level of Function: Residing at [facility] after recent hospitalization earlier this month. Recommendations: Given results of screening, patient appears to be functioning below cognitive lingusitic baseline. Anticipate need for cognitive linguistic assessment later this hospitalization or at next level of care if cognitive linguistic abilities do not improve. OBJECTIVE Oral Motor:  
· Labial: Decreased seal 
· Dentition: Natural and Limited · Oral Hygiene: Dry · Lingual: Decreased rate and Decreased strength Dysphagia evaluation: 
 Patient consumed trials of ice chips, thin by teaspoon/cup, and puree. Slowed manipulation of ice chip. No overt s/sx airway compromise with ice chips, thin by teaspoon/cup, or puree; hwoever, mild oral holding and 3-4 swallows upon palpation with all trials. Minimal left sided anterior spillage with thin by teaspoon/cup. Tool Used: Dysphagia Outcome and Severity Scale (GINA) Score Comments Normal Diet  [] 7 With no strategies or extra time needed Functional Swallow  [] 6 May have mild oral or pharyngeal delay Mild Dysphagia  [] 5 Which may require one diet consistency restricted Mild-Moderate Dysphagia  [] 4 With 1-2 diet consistencies restricted Moderate Dysphagia  [] 3 With 2 or more diet consistencies restricted Moderate-Severe Dysphagia  [x] 2 With partial PO strategies (trials with ST only) Severe Dysphagia  [] 1 With inability to tolerate any PO safely Score:  Initial: 2 Most Recent: x (Date 03/15/21 ) Interpretation of Tool: The Dysphagia Outcome and Severity Scale (GINA) is a simple, easy-to-use, 7-point scale developed to systematically rate the functional severity of dysphagia based on objective assessment and make recommendations for diet level, independence level, and type of nutrition. Current Medications: No current facility-administered medications on file prior to encounter. Current Outpatient Medications on File Prior to Encounter Medication Sig Dispense Refill  dexAMETHasone (DECADRON) 4 mg tablet One tab three times a day 90 Tab 0  
 folic acid (FOLVITE) 1 mg tablet Take 1 Tab by mouth daily. 30 Tab 5  prochlorperazine (Compazine) 10 mg tablet Take 1 Tab by mouth every six (6) hours as needed for Nausea or Vomiting for up to 30 days. Indications: nausea and vomiting caused by cancer drugs 90 Tab 3  
 ondansetron (ZOFRAN ODT) 8 mg disintegrating tablet Take 1 Tab by mouth every eight (8) hours as needed for Nausea or Vomiting. Indications: prevent nausea and vomiting from cancer chemotherapy 90 Tab 3  furosemide (LASIX) 20 mg tablet Take 1 Tab by mouth daily. Indications: accumulation of fluid resulting from chronic heart failure 90 Tab 1  
 amLODIPine (NORVASC) 5 mg tablet Take 5 mg by mouth daily.  cyanocobalamin (VITAMIN B-12) 1,000 mcg sublingual tablet Take 1,000 mcg by mouth every morning.     
 Cholecalciferol, Vitamin D3, (VITAMIN D3) 1,000 unit cap Take  by mouth every morning.  Thyroid, Pork, (ARMOUR THYROID) 120 mg Tab Take 150 mg by mouth daily. Takes 1 (120 mg) + 1 (30 mg) tab daily INTERDISCIPLINARY COLLABORATION: RN and MD 
 
After treatment position/precautions: · Upright in bed · RN notified Total Treatment Duration:  
Time In: 4821 Time Out: 8967 Michelle Martinez MS, CCC-SLP

## 2021-03-15 NOTE — PROGRESS NOTES
Hospitalist Progress Note Admit Date:  3/12/2021 10:58 AM  
Name:  Alexandra Homans Age:  68 y.o. 
:  1940 MRN:  [id number] PCP:  Aminta White MD 
Treatment Team: Attending Provider: Ignacio Couch MD; Consulting Provider: Isabel Bolton MD; Care Manager: Marcell Steiner RN 
Presenting Complaint: Shortness of Breath Hospital Course: Mr. Desirae Naqvi is a 69 y/o WM with a h/o stage 4 lung adenocarcinoma, HTN, HLD and hypothyroid recently admitted for suspected CVA. Discharged on 3/9. EMS called to rehab facility on 3/12 due to respiratory distress. He was intubated in the field and brought here. Labs showed leukocytosis (though recently has been on Decadron), KEI, hypernatremia. CXR showed a developing hazy infiltrate at the left base. Started on antibiotics and sedation. Admitted to ICU. Pulmonology consulted. Started on D5 for hyperNa. His wife has made him DNR should he deteriorate further. CoNS 1/4 bottles. Cr improving. 24hr Events/Subjective:  
3/15: Uneventfully extubated yesterday, now on RA, sodium and Cr better. Very weak appearing, weak and raspy voice. Follows commands. Denies pain or SOB. ROS neg otherwise. No other complaints Assessment and Plan:  
 
Hospital Problems as of 3/15/2021 Date Reviewed: 3/5/2021 Codes Class Noted - Resolved POA Gram-positive cocci bacteremia ICD-10-CM: R78.81 ICD-9-CM: 790.7, 041.89  3/13/2021 - Present Yes * (Principal) Acute hypoxemic respiratory failure ([facility] [address] ICD-10-CM: J96.01 
ICD-9-CM: 518.81  3/12/2021 - Present Yes HCAP (healthcare-associated pneumonia) ICD-10-CM: J18.9 ICD-9-CM: 489  3/12/2021 - Present Yes HTN (hypertension) (Chronic) ICD-10-CM: I10 
ICD-9-CM: 401.9  3/12/2021 - Present Yes Acquired hypothyroidism (Chronic) ICD-10-CM: E03.9 ICD-9-CM: 244.9  3/12/2021 - Present Yes Severe sepsis (HCC) ICD-10-CM: A41.9, R65.20 ICD-9-CM: 038.9, 995.92  3/12/2021 - Present Unknown Hypernatremia ICD-10-CM: E87.0 ICD-9-CM: 276.0  3/12/2021 - Present Yes KEI (acute kidney injury) ([facility] [address] ICD-10-CM: N17.9 ICD-9-CM: 584.9  3/12/2021 - Present Yes Mild protein-calorie malnutrition (HCC) (Chronic) ICD-10-CM: E44.1 ICD-9-CM: 263.1  3/12/2021 - Present Yes Non-small cell lung cancer (HCC) ICD-10-CM: C34.90 ICD-9-CM: 162.9  1/12/2021 - Present Yes Primary malignant neoplasm of lung metastatic to other site [facility]) ICD-10-CM: C34.90 ICD-9-CM: 162.9  12/28/2020 - Present Yes Plan: # Acute hypoxemic respiratory failure 
            - In setting of metastatic lung cancer and probable aspiration based on CT scan. Now resolved. Extubated on 3/14, currently on RA. Vanc d/c 3/14, con't Zosyn. 
  
# Normocytic anemia/thrombocytopenia - No clinical blood loss, Pepcid, heparin SQ on hold, counts stable from yesterday.  
  
# Severe sepsis             - Present on admission. Leukocytosis + tachy with suspected infiltrate. Sepsis resolved. CoNS 1/4 bottles, likely contaminant vanc d/c 3/14, cultures repeated . Con't Zosyn. 
  
# CoNS bacteremia - 1/4 bottles, vanc dc 3/14, blood cxs repeated and negative to date.  
  
# Hypernatremia             - Better, stable.  
  
# KEI 
            - Suspect pre-renal. Con't IVFs. Cr much improved. 
  
# Elevated d-dimer 
            - LE US neg for DVT, could be realted to KEI, infection, hypoxia. 
  
# Hypothyroid 
            - Pork thyroid 
  
# Stage 4 lung cancer Other listed chronic conditions stable, continue current management. DC planning: Pending. Diet:  DIET NPO 
DVT ppx: SCDs Objective:  
 
Patient Vitals for the past 24 hrs: 
 Temp Pulse Resp BP SpO2  
03/15/21 1045  77 (!) 35  97 % 03/15/21 1030  80 26  97 % 03/15/21 1015  77 22  96 % 03/15/21 1000  69 16 (!) 110/59 97 % 03/15/21 0945  77 26  97 % 03/15/21 0930  74 20  97 % 03/15/21 0915  82 25  99 % 03/15/21 0900  77 20 (!) 141/74 99 % 03/15/21 0814     100 % 03/15/21 0800  74 (!) 32 (!) 147/76 99 % 03/15/21 0700 97.4 °F (36.3 °C) 71 19 135/72 100 % 03/15/21 0600  77 23 121/64 100 % 03/15/21 0500  75 19 139/72 100 % 03/15/21 0400 97.7 °F (36.5 °C) 75 22 119/68 99 % 03/15/21 0300  79 25 132/71 99 % 03/15/21 0200  80 30 129/71 100 % 03/15/21 0100  78 20 122/68 100 % 03/15/21 0000 97.2 °F (36.2 °C) 78 19 118/65 100 % 03/14/21 2300  78 19 116/62 100 % 03/14/21 2200  84 (!) 40 121/65 100 % 03/14/21 2100  79 21 129/65 99 % 03/14/21 2055     100 % 03/14/21 2000 97.7 °F (36.5 °C) 90 (!) 31 127/69 100 % 03/14/21 1900  (!) 58 22 (!) 118/58 99 % 03/14/21 1800  67 19 117/64 98 % 03/14/21 1700  81 23 128/64 99 % 03/14/21 1629 97.3 °F (36.3 °C)      
03/14/21 1618  86 21 124/71   
03/14/21 1529  83 25 139/66 99 % 03/14/21 1429  85 26 128/70 99 % 03/14/21 1329  87 (!) 35 134/69 97 % 03/14/21 1229  88 28 127/69 95 % 03/14/21 1227     94 % 03/14/21 1225  88 25  95 % 03/14/21 1129  88 26 131/68 98 % 03/14/21 1107 98.2 °F (36.8 °C)     Oxygen Therapy O2 Sat (%): 97 % (03/15/21 1045) Pulse via Oximetry: 75 beats per minute (03/15/21 1045) O2 Device: Room air (03/15/21 3369) Skin Assessment: Clean, dry, & intact (03/15/21 0400) Skin Protection for O2 Device: N/A (03/14/21 2000) O2 Flow Rate (L/min): 3 l/min (03/15/21 0400) FIO2 (%): 30 % (03/14/21 0802) Estimated body mass index is 26.54 kg/m² as calculated from the following: 
  Height as of this encounter: 6' (1.829 m). Weight as of this encounter: 88.8 kg (195 lb 11.2 oz). Intake/Output Summary (Last 24 hours) at 3/15/2021 1051 Last data filed at 3/15/2021 8347 Gross per 24 hour Intake 3239.58 ml Output 2550 ml Net 689.58 ml *Note that automatically entered I/Os may not be accurate; dependent on patient compliance with collection and accurate  by [employer]. General:    Well nourished. Weak and raspy voice, but follows commands. NAD. CV:   RRR. No m/r/g. No edema. No JVD Lungs:   CTAB. No wheezing, rhonchi, or rales. Unlabored. RA O2. Voice is very weak. Abdomen:   Soft, nontender, nondistended. Extremities: Warm and dry. No cyanosis. B/l LE pitting edema with stasis dermatitis. Skin:     No rashes. Normal coloration Neuro:  No gross focal deficits. Alert, follows commands, moves all extremities spontaneously. Data Reviewed: 
I have reviewed all labs, meds, and studies from the last 24 hours: 
Recent Results (from the past 24 hour(s)) SODIUM Collection Time: 03/14/21 10:53 AM  
Result Value Ref Range Sodium 150 (H) 136 - 145 mmol/L  
GLUCOSE, POC Collection Time: 03/14/21 11:04 AM  
Result Value Ref Range Glucose (POC) 190 (H) 65 - 100 mg/dL SODIUM Collection Time: 03/14/21  4:25 PM  
Result Value Ref Range Sodium 147 (H) 136 - 145 mmol/L  
GLUCOSE, POC Collection Time: 03/14/21  5:06 PM  
Result Value Ref Range Glucose (POC) 274 (H) 65 - 100 mg/dL EKG, 12 LEAD, INITIAL Collection Time: 03/14/21  5:18 PM  
Result Value Ref Range Ventricular Rate 62 BPM  
 Atrial Rate 61 BPM  
 QRS Duration 108 ms Q-T Interval 424 ms QTC Calculation (Bezet) 430 ms Calculated R Axis 88 degrees Calculated T Axis 46 degrees Diagnosis Atrial fibrillation Incomplete right bundle branch block Abnormal ECG When compared with ECG of 12-MAR-2021 11:33, Atrial fibrillation has replaced Sinus rhythm Vent. rate has decreased BY  37 BPM 
QT has shortened Confirmed by Blanca Moy MD (), Cm Cano ([id number]) on 3/15/2021 6:35:48 AM 
  
GLUCOSE, POC Collection Time: 03/14/21 11:18 PM  
Result Value Ref Range Glucose (POC) 276 (H) 65 - 100 mg/dL SODIUM Collection Time: 03/14/21 11:20 PM  
Result Value Ref Range Sodium 147 (H) 136 - 145 mmol/L MAGNESIUM  Collection Time: 03/14/21 11:20 PM Result Value Ref Range Magnesium 2.6 (H) 1.8 - 2.4 mg/dL PHOSPHORUS Collection Time: 03/14/21 11:20 PM  
Result Value Ref Range Phosphorus 4.4 (H) 2.3 - 3.7 MG/DL  
CBC WITH AUTOMATED DIFF Collection Time: 03/15/21  4:46 AM  
Result Value Ref Range WBC 13.1 (H) 4.3 - 11.1 K/uL  
 RBC 2.99 (L) 4.23 - 5.6 M/uL HGB 8.9 (L) 13.6 - 17.2 g/dL HCT 27.4 (L) 41.1 - 50.3 % MCV 91.6 79.6 - 97.8 FL  
 MCH 29.8 26.1 - 32.9 PG  
 MCHC 32.5 31.4 - 35.0 g/dL  
 RDW 19.2 (H) 11.9 - 14.6 % PLATELET 80 (L) 066 - 450 K/uL MPV 12.0 9.4 - 12.3 FL ABSOLUTE NRBC 0.09 0.0 - 0.2 K/uL  
 DF AUTOMATED NEUTROPHILS 88 (H) 43 - 78 % LYMPHOCYTES 2 (L) 13 - 44 % MONOCYTES 3 (L) 4.0 - 12.0 % EOSINOPHILS 5 0.5 - 7.8 % BASOPHILS 0 0.0 - 2.0 % IMMATURE GRANULOCYTES 2 0.0 - 5.0 %  
 ABS. NEUTROPHILS 11.5 (H) 1.7 - 8.2 K/UL  
 ABS. LYMPHOCYTES 0.3 (L) 0.5 - 4.6 K/UL  
 ABS. MONOCYTES 0.4 0.1 - 1.3 K/UL  
 ABS. EOSINOPHILS 0.6 0.0 - 0.8 K/UL  
 ABS. BASOPHILS 0.0 0.0 - 0.2 K/UL  
 ABS. IMM. GRANS. 0.3 0.0 - 0.5 K/UL METABOLIC PANEL, BASIC Collection Time: 03/15/21  4:46 AM  
Result Value Ref Range Sodium 147 (H) 136 - 145 mmol/L Potassium 3.5 3.5 - 5.1 mmol/L Chloride 115 (H) 98 - 107 mmol/L  
 CO2 26 21 - 32 mmol/L Anion gap 6 (L) 7 - 16 mmol/L Glucose 242 (H) 65 - 100 mg/dL BUN 75 (H) 8 - 23 MG/DL Creatinine 1.62 (H) 0.8 - 1.5 MG/DL  
 GFR est AA 53 (L) >60 ml/min/1.73m2 GFR est non-AA 44 (L) >60 ml/min/1.73m2 Calcium 7.9 (L) 8.3 - 10.4 MG/DL  
GLUCOSE, POC Collection Time: 03/15/21  5:00 AM  
Result Value Ref Range Glucose (POC) 278 (H) 65 - 100 mg/dL Current Meds: 
Current Facility-Administered Medications Medication Dose Route Frequency  amLODIPine (NORVASC) tablet 5 mg  5 mg Oral DAILY  dexAMETHasone (DECADRON) tablet 4 mg  4 mg Oral Q8H  
 thyroid (Pork) (ARMOUR) tablet 150 mg  150 mg Oral DAILY  famotidine (PF) (PEPCID) 20 mg in 0.9% sodium chloride 10 mL injection  20 mg IntraVENous Q12H  
 insulin regular (NOVOLIN R, HUMULIN R) injection   SubCUTAneous Q6H  
 sodium chloride (NS) flush 5-40 mL  5-40 mL IntraVENous Q8H  
 sodium chloride (NS) flush 5-40 mL  5-40 mL IntraVENous PRN  
 acetaminophen (TYLENOL) tablet 650 mg  650 mg Oral Q6H PRN Or  
 acetaminophen (TYLENOL) suppository 650 mg  650 mg Rectal Q6H PRN  polyethylene glycol (MIRALAX) packet 17 g  17 g Oral DAILY PRN  
 ondansetron (ZOFRAN) injection 4 mg  4 mg IntraVENous Q6H PRN  piperacillin-tazobactam (ZOSYN) 4.5 g in 0.9% sodium chloride (MBP/ADV) 100 mL MBP  4.5 g IntraVENous Q8H  
 sodium chloride (NS) flush 5-10 mL  5-10 mL IntraVENous PRN  
 dextrose 5% infusion  75 mL/hr IntraVENous CONTINUOUS  
 [Held by provider] heparin (porcine) injection 5,000 Units  5,000 Units SubCUTAneous Q12H  
 NUTRITIONAL SUPPORT ELECTROLYTE PRN ORDERS   Does Not Apply PRN Other Studies: No results found for this visit on 03/12/21. Ct Chest Wo Cont Result Date: 3/14/2021 EXAM: Chest CT without contrast. INDICATION: Respiratory failure with stage IV adenocarcinoma of the lung. COMPARISON: Chest CT dated December 04, 2020. Multiple axial images were obtained through the chest.  Radiation dose reduction techniques were used for this study: All CT scans performed at this facility use one or all of the following: Automated exposure control, adjustment of the mA and/or kVp according to patient's size, iterative reconstruction. FINDINGS: LUNGS/PLEURA: Mucosal secretions are evident within the trachea. There is elevation of the left hemidiaphragm. Supraclavicular pulmonary mass measuring 4.4 x 4 cm in greatest axial dimension with this is for primary malignancy numerous subpleural and pleural-based nodules of the right hemithorax consistent with pleural metastatic disease with a small right pleural effusion, decreased from prior.  The largest right-sided pulmonary nodules of the right upper lobe measuring 18 x 13 mm which appears increased in size of the comparison limited given large pleural effusion on prior study. There are peribronchovascular groundglass opacities of the superior segment right lower lobe, likely infectious or inflammatory. MEDIASTINUM: Left-sided chest port with tip terminating in the right atrium. Advanced coronary artery calcific atherosclerosis. Enteric tube with tip terminating in the stomach. Surgical clips with a right-sided thyroid lobectomy. Trace pericardial effusion, likely physiologic. No mediastinal lymphadenopathy. BONES AND SOFT TISSUES: Soft tissue anasarca along the left lateral chest wall. No acute osseous abnormality. UPPER ABDOMEN: Left hepatic lobe 11 mm partially imaged right renal cyst measuring at least 3 cm. Hemorrhagic versus proteinaceous 3.1 cm and 2.5 cm left renal cyst. Soft tissue nodule the right adrenal gland measuring approximately 21 x 18 mm and nodularity of the left adrenal gland with a nodule measuring approximately 19 x 12 mm, similar to prior. 1. Right suprahilar 4.4 cm mass concerning for primary malignancy with numerous subpleural and pleural nodules of the right hemithorax indicative of pleural metastatic disease. Additionally there are bilateral solid appearing adrenal nodule suspicious for adrenal metastases, unchanged. 2. Secretions are evident within the trachea with tree-in-bud groundglass opacities of the right lower lobe superior segment, likely aspiration pneumonia. All Micro Results Procedure Component Value Units Date/Time CULTURE, RESPIRATORY/SPUTUM/BRONCH Stephani Rayo [[id number]] Collected: 03/13/21 0526 Order Status: Completed Specimen: Sputum Updated: 03/15/21 0175 Special Requests: NO SPECIAL REQUESTS     
  GRAM STAIN 0 TO 30 WBC'S SEEN PER OIF  
   NO EPITHELIAL CELLS SEEN     
      
  MODERATE GRAM POSITIVE RODS  
     
   FEW GRAM POSITIVE COCCI     
   FEW YEAST    FEW GRAM NEGATIVE RODS 4+ MUCUS PRESENT Culture result:    
  LIGHT NORMAL RESPIRATORY JANINA  
     
      
  CULTURE IN PROGRESS,FURTHER UPDATES TO FOLLOW CULTURE, BLOOD [[id number]] Collected: 03/14/21 0320 Order Status: Completed Specimen: Blood Updated: 03/15/21 4942 Special Requests: --     
  RIGHT 
HAND Culture result: NO GROWTH 1 DAY     
 CULTURE, BLOOD [[id number]] Collected: 03/14/21 0326 Order Status: Completed Specimen: Blood Updated: 03/15/21 0972 Special Requests: --     
  RIGHT 
FOREARM Culture result: NO GROWTH 1 DAY     
 CULTURE, BLOOD [[id number]] Collected: 03/12/21 1219 Order Status: Completed Specimen: Blood Updated: 03/15/21 1680 Special Requests: --     
  LEFT 
PORT Culture result: NO GROWTH 3 DAYS     
 CULTURE, BLOOD [[id number]]  (Abnormal) Collected: 03/12/21 1117 Order Status: Completed Specimen: Blood Updated: 03/15/21 0710 Special Requests: NO SPECIAL REQUESTS     
  GRAM STAIN GRAM POSITIVE COCCI AEROBIC AND ANAEROBIC BOTTLES RESULTS VERIFIED, PHONED TO AND READ BACK BY  ON 3/13/21 @ 0810 BY   
     
  Culture result: STAPHYLOCOCCUS SPECIES, COAGULASE NEGATIVE THIS ORGANISM MAY BE INDICATIVE OF CULTURE CONTAMINATION, HOWEVER, CLINICAL CORRELATION NEEDS TO BE EVALUATED, AS EACH CASE IS UNIQUE. Refer to Blood Culture ID Panel Accession [id number] BLOOD CULTURE ID PANEL [[id number]]  (Abnormal) Collected: 03/12/21 1017 Order Status: Completed Specimen: Blood Updated: 03/13/21 1320 Acc. no. from [employer] [id number] Staphylococcus Detected Comment: RESULTS VERIFIED, PHONED TO AND READ BACK BY 
Denise Nails RN @ 0046 ON 3/13/21 BY AMM 
  
  
  mecA (Methicillin-Resistance Genes) Detected Comment: Presence of mecA is highly indicative of methicillin resistance. The test does not replace traditional culture and susceptibilities INTERPRETATION Gram positive cocci in clusters. Identified by realtime PCR as  Coagulase negative Staphylococci Comment: A single positive culture of coagulase negative Staph is likely to be a contaminant in adult patients. Consider discontinuation of antibiotics for gram positive bloodstream infections if patient asymptomatic. THIS TEST DOES NOT REPLACE SENSITIVITY TESTING. CULTURE, RESPIRATORY/SPUTUM/BRONCH Zygmunt Liner [[id number]] Collected: 03/13/21 0426 Order Status: Canceled Specimen: Sputum COVID-19 RAPID TEST [[id number]] Collected: 03/12/21 2120 Order Status: Completed Specimen: Nasopharyngeal Updated: 03/12/21 2154 Specimen source Nasopharyngeal     
  COVID-19 rapid test Not detected Comment:     
The specimen is NEGATIVE for SARS-CoV-2, the novel coronavirus associated with COVID-19. A negative result does not rule out COVID-19. This test has been authorized by the FDA under an Emergency Use Authorization (EUA) for use by authorized laboratories. Fact sheet for Healthcare Providers: [employer]Update.co[employer]nz Fact sheet for Patients: [employer]nz Methodology: Isothermal Nucleic Acid Amplification SARS-CoV-2 Lab Results \"Novel Coronavirus\" Test: No results found for: COV2NT \"Emergent Disease\" Test: No results found for: EDPR \"SARS-COV-2\" Test: No results found for: XGCOVT Rapid Test:  
Lab Results Component Value Date/Time  COVR Not detected 03/12/2021 09:20 PM  
 
  
 
 
Signed: 
Shankar Busch MD

## 2021-03-15 NOTE — PROGRESS NOTES
Care Management Interventions Palliative Care Criteria Met (RRAT>21 & CHF Dx)?: No(Risk 20% Dx resp failure) Transition of Care Consult (CM Consult): Discharge Planning Discharge Durable Medical Equipment: No 
Physical Therapy Consult: No 
Occupational Therapy Consult: No 
Speech Therapy Consult: No 
Current Support Network: Lives with Spouse Discharge Location Discharge Placement: Unable to determine at this time Spoke with Dr. Joe Matias for plan of care and hopes to transfer patient to floor today and possibly ready for d/c by end of week. CM called and spoke with Yennifer Johnson patient wife about d/c plans. She states prior to admission patient lives with her required assistance with ADL's including feeding and they live in tri level home with 7 steps to bathroom and 4 steps to get to inside of home. DME includes walker and she provides transportation. They do not have any children and patient has 1 brother but that is all the family they have. Wife reports having 2 back surgeries and she has been having to take care of him by herself at home. She states if PT/OT recommend SNF she is in agreement with placement and would like to go to [facility] spoke with Cira Bernabe liaison for [facility] [place] and she states they have bed and will be able to take patient back. Will need PT/OT evaluation and orders obtained and will need Rapid COVID closer to d/c and authorization from insurance. Current d/c plan pending PT/OT evaluation but likely will be SNF.

## 2021-03-16 NOTE — PROGRESS NOTES
Care Management Interventions Palliative Care Criteria Met (RRAT>21 & CHF Dx)?: No(Dx resp failure, Risk 26%) Transition of Care Consult (CM Consult): Discharge Planning Discharge Durable Medical Equipment: No(walker) Physical Therapy Consult: Yes Occupational Therapy Consult: Yes Speech Therapy Consult: Yes Current Support Network: Lives with [facility] The Plan for Transition of Care is Related to the Following Treatment Goals : debility The Patient and/or Patient Representative was Provided with a Choice of Provider and Agrees with the Discharge Plan?: Yes Name of the Patient Representative Who was Provided with a Choice of Provider and Agrees with the Discharge Plan: wife Freedom of Choice List was Provided with Basic Dialogue that Supports the Patient's Individualized Plan of Care/Goals, Treatment Preferences and Shares the Quality Data Associated with the Providers?: Yes Discharge Location Discharge Placement: Skilled nursing facility Noted PT recommendations for SNF wife is in agreement and will need rapid COVID closer to d/c. Referral sent to [facility] [place] per wife request. CM following.

## 2021-03-16 NOTE — PROGRESS NOTES
I called and spoke to patient's son, Roel Moyer, and wife, Nathaniel Sigala. Updated them on labs and clinical progress. Also updated them on MBS results and ST's recommendation that the safest option for now is NPO. Family stated that patient had previously made it known that he did no desire any type of feeding tube and they agree. The other option would be to accept his aspiration risk and allow him to eat, though either way he will not fulfill his nutritional requirements and is expected to decline further and would not be able to tolerate further chemotherapy for his lung cancer. Wife says she does not want him to suffer or \"linger\". They have elected to consult with Hospice. Wife is not able to care for him at home by herself as the son lives in [place]. D/w CM. Hospice consulted. He is DNR.  
 
Tamar Ramirez MD

## 2021-03-16 NOTE — PROGRESS NOTES
Spoke with Dr Joe Matias about plan of care and he states that family request hospice consult as they are not wanting to pursue feeding tube. CM obtained orders and referral sent to [facility] as they are interested in [id number] [address]

## 2021-03-16 NOTE — PROGRESS NOTES
LTG: Patient will tolerate least restrictive diet without overt signs or symptoms of airway compromise. STG: Patient will participate in modified barium swallow study as clinically indicated. MET 3/16/21 STG: Patient will participate in oral/pharyngeal therapeutic exercises with 70% accuracy given moderate cues. ADDED 3/16/21. SPEECH LANGUAGE PATHOLOGY: MODIFIED BARIUM SWALLOW STUDY Initial Assessment NAME/AGE/GENDER: Rigo Haddad is a 68 y.o. male DATE: 3/16/2021 PRIMARY DIAGNOSIS: Acute respiratory failure ([facility] [address] [J96.00] HCAP (healthcare-associated pneumonia) [J18.9] ICD-10: Treatment Diagnosis: Oropharyngeal dysphagia (R13.12) INTERDISCIPLINARY COLLABORATION: Radiologist, Dr. Johnson Gaines PRECAUTIONS/ALLERGIES: Patient has no known allergies. RECOMMENDATIONS/PLAN  
DIET: Until goals of care are discussed recommend  Strict NPO  
 Can have 2-3 ice chips per hour for pleasure after oral care with supervision from nursing staff MEDICATIONS: Non-oral 
  
COMPENSATORY STRATEGIES/MODIFICATIONS INCLUDING: 
· Oral care OTHER RECOMMENDATIONS (including follow up treatment recommendations): · Treatment to improve/facilitate oral/pharyngeal skills · Training in laryngeal strengthening and coordination exercises RECOMMENDATIONS for CONTINUED SPEECH THERAPY:  
YES: Anticipate need for ongoing speech therapy during this hospitalization and at next level of care. FREQUENCY/DURATION: Continue to follow patient 2 times a week for duration of hospital stay to address above goals. ASSESSMENT Mr. Carmen Beckett presents with moderate-severe oropharyngeal dysphagia. Significantly decline in oropharyngeal swallow function since prior modified barium swallow study completed 3/5/21. Anterior oral holding for 8-15 seconds with thin, nectar, honey, and pudding trials. Poor labial seal resulted in mild anterior loss with all liquids by teaspoon, nectar by cup, and honey by cup. Severe anterior loss occurred with thin by cup. Prolonged mastication of fruit and no attempt to masticate cracker with therapist having to remove bolus from oral cavity. Most detrimental to patient's swallow were significant oral deficits combined with mistimed swallow and reduced laryngeal closure, which resulted in nonclearing penetration with thin, nectar, honey thick liquids, and liquids portion of mixed consistency. At end of study trace amount of penetrated material had failed to cords without cough response. Lingual weakness, reduced tongue base retraction, and decreased epiglottic inversion resulted in mild-moderate diffuse residue in oral cavity, base of tongue, and vallecula post swallow with all trials. Residuals would then spill to pyriforms after the swallow. No aspiration/penetration of residuals visualized; however, patient certainly at risk for aspiration/penetration of these residuals. Recommend NPO with non-oral meds until goals of care are discussed. Do not anticipate patient would meet nutritional needs orally even if cleared for oral diet due to significant oral dysphagia and impaired cognitive status. Will continue to follow to attempt oral and laryngeal strengthening exercises; however, do not anticipate patient will be able to functionally participate unless mentation improves. Consider palliative care consult to discus patient/family wishes and goals of care. If patient/family accept aspiration risk and would like patient to resume po for pleasure, recommend puree diet and thin liquids. SUBJECTIVE Patient alert, but minimally verbal. Responds to yes/no questions via head nod. Follows some 1 step commands.   
 
History of Present Injury/Illness: Mr. Carmen Beckett  has a past medical history of Acute ischemic stroke ([facility] [address] (3/4/2021), Hypercholesterolemia, Hypertension, Morbid obesity ([address] (11/11/15), Pleural effusion (12/14/2020), Primary malignant ne[facility] of lung [facility] other site [facility] (12/28/2020), and Thyroid disease. [place] He also  has a past surgical history that includes hx other surgical; hx colonoscopy; hx heent; hx hernia repair (2015); hx orthopaedic (Left); and ir insert tunl cvc w port over 5 years (1/7/2021). Pain: 
Pain Intensity 1: 0 Current dietary status prior to evaluation today:  NPO Previous Modified Barium Swallow studies: Prior modified barium swallow study completed 3/5/21 with recommendations for a mechanical soft diet/thin liquids by cup. Current Medications: No current facility-administered medications on file prior to encounter. Current Outpatient Medications on File Prior to Encounter Medication Sig Dispense Refill  dexAMETHasone (DECADRON) 4 mg tablet One tab three times a day 90 Tab 0  
 folic acid (FOLVITE) 1 mg tablet Take 1 Tab by mouth daily. 30 Tab 5  prochlorperazine (Compazine) 10 mg tablet Take 1 Tab by mouth every six (6) hours as needed for Nausea or Vomiting for up to 30 days. Indications: nausea and vomiting caused by cancer drugs 90 Tab 3  
 ondansetron (ZOFRAN ODT) 8 mg disintegrating tablet Take 1 Tab by mouth every eight (8) hours as needed for Nausea or Vomiting. Indications: prevent nausea and vomiting from cancer chemotherapy 90 Tab 3  furosemide (LASIX) 20 mg tablet Take 1 Tab by mouth daily. Indications: accumulation of fluid resulting from chronic heart failure 90 Tab 1  
 amLODIPine (NORVASC) 5 mg tablet Take 5 mg by mouth daily.  cyanocobalamin (VITAMIN B-12) 1,000 mcg sublingual tablet Take 1,000 mcg by mouth every morning.  Cholecalciferol, Vitamin D3, (VITAMIN D3) 1,000 unit cap Take  by mouth every morning.  Thyroid, Pork, (ARMOUR THYROID) 120 mg Tab Take 150 mg by mouth daily. Takes 1 (120 mg) + 1 (30 mg) tab daily OBJECTIVE Orientation:  Person via yes/no questions Oral Assessment:  Labial: Decreased seal 
Lingual: Decreased rate and Decreased strength Dentition: Limited Oral Hygiene: Dry 
Vocal Quality: Hoarse- minimal verbalizations Modified barium swallow study was performed in the radiology suite with Mr. Don Rod in the lateral plane seated upright 90° in the [address] [place] [address] Rd chair. To evaluate his swallow function, barium coated liquid and food was administered in the form of thin liquids (by spoon, cup sip and straw sip), nectar-thick liquids (by spoon, cup sip and straw sip), honey-thick liquids (by spoon, cup sip and straw sip), pudding, mixed consistency and cracker. Oral phase of swallow:  
 Inadequate labial seal 
 anterior loss  anterior bolus holding  prolonged bolus manipulation  piecemeal deglutition  bolus residual on tongue Pharyngeal phase of swallow: · Oral holding for 8-15 seconds for all liquids and pudding.  Thin by teaspoon/cup/staw. Mild-moderate anterior loss with thin by teaspoon and severe anterior loss with thin by cup with patient swallowing only minimal amounts. No aspiration/penetration with minimal amount of thin by teaspoon/cup. Oral holding persisted with thin by straw. No anterior loss. Patient appeared to be having difficulty initiating swallow as he pumps tongue and holds bolus anteriorly. Swallow eventually triggered at posterior epiglottis resulting in nonclearing penetration during the swallow.  Nectar by teaspoon/cup/straw. Continues to exhibit prolonged bolus holding. Swallow more prompt with nectar by teaspoon with no aspiration/pentration occurring. Initially deferred cup sips of nectar due to severe anterior loss observed with thin by cup. Swallow delayed at posterior epiglottis with nectar by straw with patient piecmealing bolus at times. Nonclearing penetration occurred during the swallow with nectar by straw. Attempted nectar by cup with patient exhibiting only min-mild anterior loss; however, continued to have delayed swallow at posterior epiglottis with nonclearing penetration occurring during the swallow.  
 Honey by teaspoon/cup/straw. Persistent oral holding. Timely swallow with honey by teaspoon. No aspiration penetration. Delayed swallow at posterior epiglottic with trace nonclearing penetration during the swallow with honey by cup. Patient unable to draw honey thick liquid through straw.  Pudding: piecemeal swallows. Swallow triggered at vallecula for both swallows. No aspiration/penetration.  Mixed fruit: pronged mastication. Nonclearing penetration during the swallow with liquid portion.  Cracker- Holds bolus in oral cavity pumping tongue, but does not masticate bolus. Therapist removed cracker from oral cavity. · Returned to thin by straw at end of study as patient had laryngeal penetration with all consistencies. Previously penetrated material note to fall to cords during the swallow with thin by straw without triggering cough response.  Mild oral, tongue base, and vallecular residue post swallow with thin, nectar, honey, an dpudd liquids that spills to pyriforms after the swallow. Pharyngeal characteristics: 
 delayed pharyngeal swallow initiation  delayed and decreased retraction of base of tongue  delayed and decreased hyolaryngeal elevation/excursion  delayed and decreased epiglottic inversion  delayed constriction of posterior pharyngeal wall  delayed and decreased laryngeal closure Attempted strategies:  
 none Effective strategies:  
 none Aspiration/Penetration Scale: 5 (Deep penetration/visible residue. Contrast contacts the folds and is not ejected.) Cervical esophageal phase of swallow:  
 a limited view. Therefore, unable to rule out an esophageal component of dysphagia **Distal esophagus not assessed due to limitations of MBS study. Assessment/Reassessment only, no treatment provided today. Tool Used: Dysphagia Outcome and Severity Scale (GINA) Comments Normal Diet With no strategies or extra time needed Functional Swallow May have mild oral or pharyngeal delay Mild Dysphagia Which may require one diet consistency restricted Mild-Moderate Dysphagia With 1-2 diet consistencies restricted Moderate Dysphagia With 2 or more diet consistencies restricted Moderately Severe Dysphagia With partial PO strategies (trials with ST only) Severe Dysphagia With inability to tolerate any PO safely Score:  Initial: 2 Most Recent: x (Date:3/16/2021) Interpretation of Tool: The Dysphagia Outcome and Severity Scale (GINA) is a simple, easy-to-use, 7-point scale developed to systematically rate the functional severity of dysphagia based on objective assessment and make recommendations for diet level, independence level, and type of nutrition. [place] / [place] / Product Type: Medicare /  
 
Education: · Recommendations discussed with patient, RN, and hospitalist. 
 
Safety: After treatment position/precautions: 
· Patient waiting in radiology holding bay for transport back to room. Total Treatment Duration: 
Time In: 1330 Time Out: 1400 Anat Alexander MS, CCC-SLP

## 2021-03-16 NOTE — PROGRESS NOTES
Nutrition Note MBS today with recommendation for NPO status. Noted new order today for Hospice consult. If TF is c/w with GOC and is pursued, please order nutrition consult for TF management. Electronically signed by Soumya Bowden RD on 3/16/2021 at 4:40 PM 
 
Contact: [phone]

## 2021-03-16 NOTE — HOSPICE
[facility] Referral received from [facility] to evaluate patient for [facility]. Wife unable to care for patient at home and cannot afford a nursing home. At this time, patient is still receiving IV antibiotics and continuous IV fluids and has not yet been made comfort care. He has not yet received any medications for symptoms to support inpatient hospice care at [facility]. Will follow up tomorrow to see if comfort meds for symptom management were needed during the night. DELFINO nolan. Thank you for this referral, Sonia Chicas RN, BSN 
[facility] liaison [phone]

## 2021-03-16 NOTE — PROGRESS NOTES
Hospitalist Progress Note Admit Date:  3/12/2021 10:58 AM  
Name:  Abdi Torre Age:  68 y.o. 
:  1940 MRN:  [id number] PCP:  Addis Borja MD 
Treatment Team: Attending Provider: Ruchi Johnson MD; Care Manager: Edna Hatfield, YULISA; Primary Nurse: Fernanda Marc; Occupational Therapist: Jr Wallace, OT; Physical Therapist: Олег Bedolla PT; Primary Nurse: Emiliano Saravia RN 
Presenting Complaint: Shortness of Breath Hospital Course: Mr. Jessica Chavarria is a 67 y/o WM with a h/o stage 4 lung adenocarcinoma, HTN, HLD and hypothyroid recently admitted for suspected CVA. Discharged on 3/9. EMS called to rehab facility on 3/12 due to respiratory distress. He was intubated in the field and brought here. Labs showed leukocytosis (though recently has been on Decadron), KEI, hypernatremia. CXR showed a developing hazy infiltrate at the left base. Started on antibiotics and sedation. Admitted to ICU. Pulmonology consulted. Started on D5 for hyperNa. His wife has made him DNR should he deteriorate further. CoNS 1/4 bottles, likely contaminant, cultures repeated. Cr and Na improving. Extubated uneventfully on 3/14. Speech following. 24hr Events/Subjective:  
3/16: Still very weak appearing, knows he's in the hospital. NPO, MBS scheduled for today. Na and Cr improved. No other complaints Assessment and Plan:  
 
Hospital Problems as of 3/16/2021 Date Reviewed: 3/5/2021 Codes Class Noted - Resolved POA Gram-positive cocci bacteremia ICD-10-CM: R78.81 ICD-9-CM: 790.7, 041.89  3/13/2021 - Present Yes * (Principal) Acute hypoxemic respiratory failure ([facility] [address] ICD-10-CM: J96.01 
ICD-9-CM: 518.81  3/12/2021 - Present Yes HCAP (healthcare-associated pneumonia) ICD-10-CM: J18.9 ICD-9-CM: 028  3/12/2021 - Present Yes HTN (hypertension) (Chronic) ICD-10-CM: I10 
ICD-9-CM: 401.9  3/12/2021 - Present Yes  Acquired hypothyroidism (Chronic) ICD-10-CM: E03.9 ICD-9-CM: 244.9  3/12/2021 - Present Yes Severe sepsis (HCC) ICD-10-CM: A41.9, R65.20 ICD-9-CM: 038.9, 995.92  3/12/2021 - Present Unknown Hypernatremia ICD-10-CM: E87.0 ICD-9-CM: 276.0  3/12/2021 - Present Yes KEI (acute kidney injury) ([facility] [address] ICD-10-CM: N17.9 ICD-9-CM: 584.9  3/12/2021 - Present Yes Mild protein-calorie malnutrition (HCC) (Chronic) ICD-10-CM: E44.1 ICD-9-CM: 263.1  3/12/2021 - Present Yes Non-small cell lung cancer (HCC) ICD-10-CM: C34.90 ICD-9-CM: 162.9  1/12/2021 - Present Yes Primary malignant neoplasm of lung metastatic to other site [facility]) ICD-10-CM: C34.90 ICD-9-CM: 162.9  12/28/2020 - Present Yes Plan: # Acute hypoxemic respiratory failure 
            - In setting of metastatic lung cancer and probable aspiration based on CT scan. Now resolved. Extubated on 3/14, currently on RA. Con't Zosyn for aspiration. 
  
# Normocytic anemia/thrombocytopenia 
            - No clinical blood loss, Pepcid, heparin SQ on hold. - Stable from yesterday # HypoK - Replace IV # Hypernatremia             - Stable on D5. # Severe sepsis             - Present on admission. Leukocytosis + tachy with suspected infiltrate. Sepsis resolved. CoNS 1/4 bottles, likely contaminant, vanc d/c 3/14, cultures repeated and NTD. Con't Zosyn. 
  
# CoNS bacteremia             - 1/4 bottles, suspected contaminant, vanc dc 3/14, blood cxs repeated and NTD. 
  
# KEI 
            - Pre-renal, now resolved and Cr normal.  
  
# Elevated d-dimer 
            - LE US neg for DVT, could be realted to KEI, infection, hypoxia. 
  
# Hypothyroid 
            - Pork thyroid 
  
# Stage 4 lung cancer Other listed chronic conditions stable, continue current management. DC planning: Pending. Will speak to wife today. Diet:  DIET NPO 
DVT ppx: SCDs Objective:  
 
Patient Vitals for the past 24 hrs: 
 Temp Pulse Resp BP SpO2  
03/16/21 0735 98.2 °F (36.8 °C) 89 26 114/64 96 % 03/16/21 0433 98 °F (36.7 °C)      
03/16/21 0400 98 °F (36.7 °C) 68 (!) 39 113/68 97 % 03/16/21 0200  69 28 118/66 95 % 03/16/21 0100  85 (!) 38 137/65 96 % 03/16/21 0000  70 26 (!) 106/46 95 % 03/15/21 2300 98.2 °F (36.8 °C) 77 12 (!) 108/50 96 % 03/15/21 2200  69 27 (!) 110/57 95 % 03/15/21 2100  78 29 (!) 115/53 96 % 03/15/21 2000  92 23 (!) 116/55 95 % 03/15/21 1901 98.7 °F (37.1 °C) 89 24 (!) 118/57 96 % 03/15/21 1900  90 25 (!) 118/57 96 % 03/15/21 1845  87 25  95 % 03/15/21 1830  89 25  95 % 03/15/21 1815  89 13  96 % 03/15/21 1800  88 25 117/62 95 % 03/15/21 1745  89 23  95 % 03/15/21 1715  86 27  95 % 03/15/21 1700  85 21 117/60 95 % 03/15/21 1645  84 26  95 % 03/15/21 1630  84 24  95 % 03/15/21 1615  85 25  94 % 03/15/21 1600  85 24 124/64 95 % 03/15/21 1545  83 22  95 % 03/15/21 1515  81 24  95 % 03/15/21 1500 96.8 °F (36 °C) 83 27 125/64 96 % 03/15/21 1445  81 30  96 % 03/15/21 1430  82 22  96 % 03/15/21 1415  84 (!) 32  96 % 03/15/21 1400  81 24 115/65 96 % 03/15/21 1345  81 (!) 33  96 % 03/15/21 1330  82 23  96 % 03/15/21 1315  80 (!) 7  97 % 03/15/21 1300  80 26 135/63 97 % 03/15/21 1245  83 30  96 % 03/15/21 1230  81 23  98 % 03/15/21 1215  80 28  97 % 03/15/21 1200  77 21 122/64 97 % 03/15/21 1145  79 23  97 % 03/15/21 1130  80 26  98 % 03/15/21 1115  78 21  98 % 03/15/21 1100 97.8 °F (36.6 °C) 75 17 139/71 97 % 03/15/21 1045  77 (!) 35  97 % 03/15/21 1030  80 26  97 % 03/15/21 1015  77 22  96 % 03/15/21 1000  69 16 (!) 110/59 97 % 03/15/21 0945  77 26  97 % 03/15/21 0930  74 20  97 % 03/15/21 0915  82 25  99 % 03/15/21 0900  77 20 (!) 141/74 99 % Oxygen Therapy O2 Sat (%): 96 % (03/16/21 0735) Pulse via Oximetry: 75 beats per minute (03/16/21 0400) O2 Device: Room air (03/16/21 0400) Skin Assessment: Clean, dry, & intact (03/15/21 1100) Skin Protection for O2 Device: N/A (03/15/21 1100) O2 Flow Rate (L/min): 3 l/min (03/15/21 0400) FIO2 (%): 30 % (03/14/21 0802) Estimated body mass index is 26.85 kg/m² as calculated from the following: 
  Height as of this encounter: 6' (1.829 m). Weight as of this encounter: 89.8 kg (198 lb). Intake/Output Summary (Last 24 hours) at 3/16/2021 9763 Last data filed at 3/15/2021 1901 Gross per 24 hour Intake  Output 1210 ml Net -1210 ml  
   
*Note that automatically entered I/Os may not be accurate; dependent on patient compliance with collection and accurate  by techs. General:    Weak and tired/frail appearing. Weak voice. CV:   RRR. No m/r/g. No edema. No JVD Lungs:   CTAB. No wheezing, rhonchi, or rales. Unlabored. Raspy and weak voice. RA O2. Abdomen:   Soft, nontender, nondistended. Extremities: Warm and dry. No cyanosis. Stasis dermatitis, b/l LE pitting edema. Skin:     No rashes. Normal coloration Neuro:  No gross focal deficits. Alert. Data Reviewed: 
I have reviewed all labs, meds, and studies from the last 24 hours: 
Recent Results (from the past 24 hour(s)) GLUCOSE, POC Collection Time: 03/15/21 12:52 PM  
Result Value Ref Range Glucose (POC) 163 (H) 65 - 100 mg/dL GLUCOSE, POC Collection Time: 03/15/21  6:20 PM  
Result Value Ref Range Glucose (POC) 124 (H) 65 - 100 mg/dL GLUCOSE, POC Collection Time: 03/15/21 11:29 PM  
Result Value Ref Range Glucose (POC) 155 (H) 65 - 100 mg/dL CBC WITH AUTOMATED DIFF Collection Time: 03/16/21  4:40 AM  
Result Value Ref Range WBC 12.3 (H) 4.3 - 11.1 K/uL  
 RBC 3.14 (L) 4.23 - 5.6 M/uL HGB 9.4 (L) 13.6 - 17.2 g/dL HCT 28.7 (L) 41.1 - 50.3 % MCV 91.4 79.6 - 97.8 FL  
 MCH 29.9 26.1 - 32.9 PG  
 MCHC 32.8 31.4 - 35.0 g/dL  
 RDW 19.7 (H) 11.9 - 14.6 % PLATELET 80 (L) 413 - 450 K/uL MPV 11.3 9.4 - 12.3 FL  ABSOLUTE NRBC 0. 08 0.0 - 0.2 K/uL  
 DF AUTOMATED NEUTROPHILS 89 (H) 43 - 78 % LYMPHOCYTES 2 (L) 13 - 44 % MONOCYTES 2 (L) 4.0 - 12.0 % EOSINOPHILS 5 0.5 - 7.8 % BASOPHILS 0 0.0 - 2.0 % IMMATURE GRANULOCYTES 3 0.0 - 5.0 %  
 ABS. NEUTROPHILS 11.0 (H) 1.7 - 8.2 K/UL  
 ABS. LYMPHOCYTES 0.2 (L) 0.5 - 4.6 K/UL  
 ABS. MONOCYTES 0.2 0.1 - 1.3 K/UL  
 ABS. EOSINOPHILS 0.6 0.0 - 0.8 K/UL  
 ABS. BASOPHILS 0.0 0.0 - 0.2 K/UL  
 ABS. IMM. GRANS. 0.3 0.0 - 0.5 K/UL METABOLIC PANEL, BASIC Collection Time: 03/16/21  4:40 AM  
Result Value Ref Range Sodium 147 (H) 136 - 145 mmol/L Potassium 3.3 (L) 3.5 - 5.1 mmol/L Chloride 115 (H) 98 - 107 mmol/L  
 CO2 26 21 - 32 mmol/L Anion gap 6 (L) 7 - 16 mmol/L Glucose 169 (H) 65 - 100 mg/dL BUN 52 (H) 8 - 23 MG/DL Creatinine 1.19 0.8 - 1.5 MG/DL  
 GFR est AA >60 >60 ml/min/1.73m2 GFR est non-AA >60 >60 ml/min/1.73m2 Calcium 8.1 (L) 8.3 - 10.4 MG/DL  
GLUCOSE, POC Collection Time: 03/16/21  5:30 AM  
Result Value Ref Range Glucose (POC) 186 (H) 65 - 100 mg/dL Current Meds: 
Current Facility-Administered Medications Medication Dose Route Frequency  potassium chloride 20 mEq in 100 ml IVPB  20 mEq IntraVENous Q2H  
 amLODIPine (NORVASC) tablet 5 mg  5 mg Oral DAILY  thyroid (Pork) (ARMOUR) tablet 150 mg  150 mg Oral DAILY  dexamethasone (DECADRON) 4 mg/mL injection 4 mg  4 mg IntraVENous Q8H  
 famotidine (PF) (PEPCID) 20 mg in 0.9% sodium chloride 10 mL injection  20 mg IntraVENous Q12H  
 insulin regular (NOVOLIN R, HUMULIN R) injection   SubCUTAneous Q6H  
 sodium chloride (NS) flush 5-40 mL  5-40 mL IntraVENous Q8H  
 sodium chloride (NS) flush 5-40 mL  5-40 mL IntraVENous PRN  
 acetaminophen (TYLENOL) tablet 650 mg  650 mg Oral Q6H PRN Or  
 acetaminophen (TYLENOL) suppository 650 mg  650 mg Rectal Q6H PRN  polyethylene glycol (MIRALAX) packet 17 g  17 g Oral DAILY PRN  
 ondansetron (ZOFRAN) injection 4 mg 4 mg IntraVENous Q6H PRN  piperacillin-tazobactam (ZOSYN) 4.5 g in 0.9% sodium chloride (MBP/ADV) 100 mL MBP  4.5 g IntraVENous Q8H  
 sodium chloride (NS) flush 5-10 mL  5-10 mL IntraVENous PRN  
 dextrose 5% infusion  75 mL/hr IntraVENous CONTINUOUS  
 [Held by provider] heparin (porcine) injection 5,000 Units  5,000 Units SubCUTAneous Q12H  
 NUTRITIONAL SUPPORT ELECTROLYTE PRN ORDERS   Does Not Apply PRN Other Studies: No results found for this visit on 03/12/21. No results found. All Micro Results Procedure Component Value Units Date/Time CULTURE, RESPIRATORY/SPUTUM/BRONCH Kayla Maya [[id number]] Collected: 03/13/21 0526 Order Status: Completed Specimen: Sputum Updated: 03/16/21 6536 Special Requests: NO SPECIAL REQUESTS     
  GRAM STAIN 0 TO 30 WBC'S SEEN PER OIF  
   NO EPITHELIAL CELLS SEEN     
      
  MODERATE GRAM POSITIVE RODS  
     
   FEW GRAM POSITIVE COCCI     
   FEW YEAST FEW GRAM NEGATIVE RODS 4+ MUCUS PRESENT Culture result: MODERATE NORMAL RESPIRATORY JANINA  
     
 CULTURE, BLOOD [[id number]] Collected: 03/14/21 0320 Order Status: Completed Specimen: Blood Updated: 03/16/21 8070 Special Requests: --     
  RIGHT 
HAND Culture result: NO GROWTH 2 DAYS     
 CULTURE, BLOOD [[id number]] Collected: 03/14/21 0326 Order Status: Completed Specimen: Blood Updated: 03/16/21 3809 Special Requests: --     
  RIGHT 
FOREARM Culture result: NO GROWTH 2 DAYS     
 CULTURE, BLOOD [[id number]] Collected: 03/12/21 1219 Order Status: Completed Specimen: Blood Updated: 03/16/21 1333 Special Requests: --     
  LEFT 
PORT Culture result: NO GROWTH 4 DAYS     
 CULTURE, BLOOD [[id number]]  (Abnormal) Collected: 03/12/21 1117 Order Status: Completed Specimen: Blood Updated: 03/15/21 0710 Special Requests: NO SPECIAL REQUESTS     
  GRAM STAIN GRAM POSITIVE COCCI   AEROBIC AND ANAEROBIC BOTTLES  
 RESULTS VERIFIED, PHONED TO AND READ BACK BY  ON 3/13/21 @ 0810 BY VALENTINA  
     
  Culture result: STAPHYLOCOCCUS SPECIES, COAGULASE NEGATIVE THIS ORGANISM MAY BE INDICATIVE OF CULTURE CONTAMINATION, HOWEVER, CLINICAL CORRELATION NEEDS TO BE EVALUATED, AS EACH CASE IS UNIQUE. Refer to Blood Culture ID Panel Accession [id number] BLOOD CULTURE ID PANEL [[id number]]  (Abnormal) Collected: 03/12/21 1017 Order Status: Completed Specimen: Blood Updated: 03/13/21 1320 Acc. no. from RuffaloCODY [id number] Staphylococcus Detected Comment: RESULTS VERIFIED, PHONED TO AND READ BACK BY 
Shala Jean, RN @ 1327 ON 3/13/21 BY AMM 
  
  
  mecA (Methicillin-Resistance Genes) Detected Comment: Presence of mecA is highly indicative of methicillin resistance. The test does not replace traditional culture and susceptibilities INTERPRETATION Gram positive cocci in clusters. Identified by realtime PCR as  Coagulase negative Staphylococci Comment: A single positive culture of coagulase negative Staph is likely to be a contaminant in adult patients. Consider discontinuation of antibiotics for gram positive bloodstream infections if patient asymptomatic. THIS TEST DOES NOT REPLACE SENSITIVITY TESTING. CULTURE, RESPIRATORY/SPUTUM/BRONCH Ruffus Gabriella [[id number]] Collected: 03/13/21 0426 Order Status: Canceled Specimen: Sputum COVID-19 RAPID TEST [[id number]] Collected: 03/12/21 2120 Order Status: Completed Specimen: Nasopharyngeal Updated: 03/12/21 2154 Specimen source Nasopharyngeal     
  COVID-19 rapid test Not detected Comment:     
The specimen is NEGATIVE for SARS-CoV-2, the novel coronavirus associated with COVID-19. A negative result does not rule out COVID-19. This test has been authorized by the FDA under an Emergency Use Authorization (EUA) for use by authorized laboratories.   
    
Fact sheet for Healthcare Providers: https://www.fda.gov/media/982467/download 
Fact sheet for Patients: https://www.fda.gov/media/691445/download 
    
Methodology: Isothermal Nucleic Acid Amplification 
  
  
  
 
 
SARS-CoV-2 Lab Results 
\"Novel Coronavirus\" Test: No results found for: COV2NT  
\"Emergent Disease\" Test: No results found for: EDPR 
\"SARS-COV-2\" Test: No results found for: XGCOVT 
Rapid Test:  
Lab Results  
Component Value Date/Time  
 COVR Not detected 03/12/2021 09:20 PM  
 
  
 
 
Signed: 
Jules Ritchie MD

## 2021-03-16 NOTE — PROGRESS NOTES
Problem: Mobility Impaired (Adult and Pediatric) Goal: *Acute Goals and Plan of Care (Insert Text) Description: STG: 
(1.)Mr. Gaby Bales will move from supine to sit and sit to supine  with MODERATE ASSIST within 5 treatment day(s). (2.)Mr. Gaby Bales will sit edge of bed for 2 minutes without support within 5 treatment day(s). (3.)Mr. Gaby Bales will transition from sitting to standing with maximum assist within 5 treatment day(s). LTG: 
(1.)Mr. Gaby Bales will move from supine to sit and sit to supine  in bed with MINIMAL ASSIST within 10 treatment day(s). (2.)Mr. Gaby Bales will transfer from bed to chair and chair to bed with MODERATE ASSIST using the least restrictive device within 10 treatment day(s). (3.)Mr. Gaby Bales will ambulate with MODERATE ASSIST for 10 feet with the least restrictive device within 10 treatment day(s). ________________________________________________________________________________________________ Outcome: Progressing Towards Goal 
  
PHYSICAL THERAPY: Initial Assessment and AM 3/16/2021 INPATIENT: PT Visit Days : 1 [place] / [place] / Product Type: Medicare /   
  
NAME/AGE/GENDER: Daniella Ro is a 68 y.o. male PRIMARY DIAGNOSIS: Acute respiratory failure ([facility] [address] [J96.00] HCAP (healthcare-associated pneumonia) [J18.9] Acute hypoxemic respiratory failure ([facility] [address] Acute hypoxemic respiratory failure ([facility] [address] ICD-10: Treatment Diagnosis:  
 Generalized Muscle Weakness (M62.81) Precaution/Allergies: 
Patient has no known allergies. ASSESSMENT:  
 
Mr. Gaby Bales presents with above diagnosis. Patient was hospitalized at [facility] 3/4-3/9 with suspected CVA. He was discharged to [facility] for [employer]. Prior to his admission at [facility], patient with decreased mobility with walker or wheelchair in the home.   He did ambulate at time of PT assessment on 3/5 for up to 13' with RW but unsure if he had made any progress during his short time at [facility]. Patient currently demonstrating grossly decreased LE strength but some functional UE strength and AROM. He verbalized very little but did nod his head in response to questions. Patient required max A to get to edge of bed but was able to sit at times without external support. Worked on facilitated weight shifts to L and R over forearms and he was able to assist with returning to upright. Asked patient if he wanted to attempt standing and he stated \"Why not\". Attempted x 2 with total assist but patient was unable to activate LE musculature to assist or get his bottom off the bed, even with bed elevated significantly. Will work with patient during admission  but unsure of his rehab potential.  He did enjoy sitting edge of bed. Patient has an additional diagnosis of stage IV lung cancer and has been receiving radiation treatments which are likely contributing to his significant weakness. Plan at this time is for patient to return to [facility]. This section established at most recent assessment PROBLEM LIST (Impairments causing functional limitations): 
Decreased Strength Decreased ADL/Functional Activities Decreased Transfer Abilities Decreased Ambulation Ability/Technique Decreased Balance Decreased Activity Tolerance Increased Fatigue INTERVENTIONS PLANNED: (Benefits and precautions of physical therapy have been discussed with the patient.) Balance Exercise Bed Mobility Family Education Home Exercise Program (HEP) Therapeutic Activites Therapeutic Exercise/Strengthening TREATMENT PLAN: Frequency/Duration: daily for duration of hospital stay Rehabilitation Potential For Stated Goals: Fair REHAB RECOMMENDATIONS (at time of discharge pending progress):   
Placement:  
It is my opinion, based on this patient's performance to date, that Mr. Shonda Todd may benefit from intensive therapy at a [address] [place] [address] after discharge due to the functional deficits listed above that are likely to improve with skilled rehabilitation and inability to be cared for in the home . Equipment:  
None at this time HISTORY:  
History of Present Injury/Illness (Reason for Referral): 
Patient is a 68 y.o. male who presented to the ED for cc respiratory failure requiring intubation with EMS. 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 Wife is at bedside who states he has not been eating or drinking well for weeks and having difficulty with swallowing. Vitals -  Labs- WBC 16.2, Na 159, creatine 2.9 from baseline 1 Chest x ray - Developing mild hazy infiltrate at left lung base with otherwise stable Past Medical History/Comorbidities:  
Mr. Donavan Cobian  has a past medical history of Acute ischemic stroke ([facility] [address] (3/4/2021), Hypercholesterolemia, Hypertension, Morbid obesity ([facility] [address] (11/11/15), Pleural effusion (12/14/2020), Primary malignant neoplasm of lung metastatic to other site [facility]) (12/28/2020), and Thyroid disease. Mr. Donavan Cobian  has a past surgical history that includes hx other surgical; hx colonoscopy; hx heent; hx hernia repair (2015); hx orthopaedic (Left); and ir insert tunl cvc w port over 5 years (1/7/2021). Social History/Living Environment:  
Home Environment: Private residence # Steps to Enter: 4 One/Two Story Residence: Split level # of Interior Steps: 7 Living Alone: No 
Support Systems: Spouse/Significant Other/Partner Patient Expects to be Discharged to[de-identified] Skilled nursing facility Current DME Used/Available at Home: Nola Stewart Prior Level of Function/Work/Activity: SFD 3/4-3/9 for possible CVA with d/c to [facility].   Prior, ambulation with walker or use of wheelchair. Number of Personal Factors/Comorbidities that affect the Plan of Care: 1-2: MODERATE COMPLEXITY EXAMINATION:  
Most Recent Physical Functioning:  
Gross Assessment: 
AROM: Grossly decreased, non-functional(B LEs) Strength: Grossly decreased, non-functional(B LEs) Coordination: Grossly decreased, non-functional 
         
  
Posture: 
Posture (WDL): Exceptions to [facility] Posture Assessment: Forward head, Rounded shoulders Balance: 
Sitting: Impaired Sitting - Static: Fair (occasional) Sitting - Dynamic: Poor (constant support) Bed Mobility: 
Rolling: Maximum assistance; Total assistance Supine to Sit: Maximum assistance Sit to Supine: Maximum assistance Scooting: Total assistance Wheelchair Mobility: 
  
Transfers: 
Sit to Stand: (attempted x 2 but unable to stand) Gait: 
  
   
  
Body Structures Involved: Muscles Body Functions Affected: Movement Related Activities and Participation Affected: Mobility Number of elements that affect the Plan of Care: 3: MODERATE COMPLEXITY CLINICAL PRESENTATION:  
Presentation: Evolving clinical presentation with changing clinical characteristics: MODERATE COMPLEXITY CLINICAL DECISION MAKING:  
[facility] AM-PAC 6 Clicks Basic Mobility Inpatient Short Form How much difficulty does the patient currently have. .. Unable A Lot A Little None 1. Turning over in bed (including adjusting bedclothes, sheets and blankets)? [] 1   [x] 2   [] 3   [] 4  
2. Sitting down on and standing up from a chair with arms ( e.g., wheelchair, bedside commode, etc.)   [x] 1   [] 2   [] 3   [] 4  
3. Moving from lying on back to sitting on the side of the bed? [] 1   [x] 2   [] 3   [] 4 How much help from another person does the patient currently need. .. Total A Lot A Little None 4. Moving to and from a bed to a chair (including a wheelchair)? [x] 1   [] 2   [] 3   [] 4  
5. Need to walk in hospital room? [x] 1   [] 2   [] 3   [] 4  
6. Climbing 3-5 steps with a railing? [x] 1   [] 2   [] 3   [] 4  
© 2007, Trustees of [address] [place] [address] [address] [address], under license to [employer]. All rights reserved Score:  Initial: 8 Most Recent: X (Date: -- ) Interpretation of Tool:  Represents activities that are increasingly more difficult (i.e. Bed mobility, Transfers, Gait). Medical Necessity:    
Patient is expected to demonstrate progress in  
strength, balance, and coordination 
 to  
decrease assistance required with mobility. . 
Reason for Services/Other Comments: 
Patient continues to require skilled intervention due to Decreased strength and balance affecting mobility. .  
Use of outcome tool(s) and clinical judgement create a POC that gives a: Questionable prediction of patient's progress: MODERATE COMPLEXITY  
  
 
 
 
TREATMENT:  
(In addition to Assessment/Re-Assessment sessions the following treatments were rendered) Pre-treatment Symptoms/Complaints:  Patient nodded his head in agreement. Pain: Initial:  
Pain Intensity 1: 0  Post Session:  0 Therapeutic Activity: (    25 minutes): Therapeutic activities including sitting balance edge of bed with facilitated lateral weight shifts over forearms and attempted sit <> stand transitions from elevated bed to improve mobility, strength, balance, and coordination. Required moderate verbal and manual cues   to promote static and dynamic balance in sitting. Braces/Orthotics/Lines/Etc:  
IV 
rodgers catheter O2 Device: Room air Treatment/Session Assessment:   
Response to Treatment:  Patient pleasant and cooperative. Did enjoy sitting up on edge of bed. Interdisciplinary Collaboration:  
Physical Therapist 
Registered Nurse After treatment position/precautions:  
Supine in bed Bed/Chair-wheels locked Call light within reach Nurse at bedside Compliance with Program/Exercises: Will assess as treatment progresses Recommendations/Intent for next treatment session:   \"Next visit will focus on reduction in assistance provided\". Total Treatment Duration: PT Patient Time In/Time Out Time In: 0900 Time Out: 0930 Celina Diehl PT

## 2021-03-16 NOTE — PROGRESS NOTES
Problem: Self Care Deficits Care Plan (Adult) Goal: *Acute Goals and Plan of Care (Insert Text) Description: 1. Patient will perform feeding after set-up and minimal assistance. (even if eating minimal amounts). 2. Patient will perform grooming seated edge of bed with moderate assistance and 1-3 rest breaks. 3. Patient will perform Upper body dressing seated edge of bed with moderate assistance and 1-3 rest breaks. 4. Patient will perform  body bathing seated edge of bed with moderate assistance and 1-3 rest breaks. 5. Patient will participate in 30 + minutes of ADL/ therapeutic exercise/therapeutic activity with min rest breaks to increase activity tolerance for self care. Goals to be achieved in 7 days. Outcome: Progressing Towards Goal 
  
OCCUPATIONAL THERAPY: Initial Assessment, Daily Note, and PM 3/16/2021 INPATIENT:   
[place] / [place] / Product Type: Medicare /  
  
NAME/AGE/GENDER: Torrey Marcos is a 68 y.o. male PRIMARY DIAGNOSIS:  Acute respiratory failure ([address] [J96.00] HCAP (healthcare-associated pneumonia) [J18.9] Acute hypoxemic respiratory failure ([address] Acute hypoxemic respiratory failure ([address] ICD-10: Treatment Diagnosis:  
 Generalized Muscle Weakness (M62.81) Other lack of cordination (R27.8) Precautions/Allergies: 
   Patient has no known allergies. ASSESSMENT:  
 
Mr. Mark Pickens presents with above diagnosis and was seen in ICU room. Pt is here from [facility] after being in our hospital for a suspected CVA. Pt also has stage IV lung cancer and is getting radiation. Pt is found to have significant generalized weakness, decreased attention and does not speak often. He does seems to understand. Pt was noted to be incontinent of bowel. OT assisted CNA with brief change and bed change. Pt seemed unaware of having a BM but did assist as he could with upper body rolling.  Lower body is noted to be very weak and almost flaccid with legs in frog position if not positioned. Pt requires assistance with all aspects of self care at this time. OT will follow to maximize independence with simple self care dependent on overall health. This section established at most recent assessment PROBLEM LIST (Impairments causing functional limitations): 
Decreased Strength Decreased ADL/Functional Activities Decreased Transfer Abilities Decreased Balance Decreased Activity Tolerance Decreased Cognition INTERVENTIONS PLANNED: (Benefits and precautions of occupational therapy have been discussed with the patient.) Activities of daily living training Balance training Cognitive training Donning&doffing training Therapeutic activity Therapeutic exercise TREATMENT PLAN: Frequency/Duration: Follow patient 2 times per week to address above goals. Rehabilitation Potential For Stated Goals: Fair REHAB RECOMMENDATIONS (at time of discharge pending progress):   
Placement: It is my opinion, based on this patient's performance to date, that Mr. Benigno Cordova may benefit from intensive therapy at a [address] [place] [address] after discharge due to the functional deficits listed above that are likely to improve with skilled rehabilitation and amount of care needed and significant rehab needed to hopefully get to mobility and independence back dependent on overall health . Equipment:  
None at this time OCCUPATIONAL PROFILE AND HISTORY:  
History of Present Injury/Illness (Reason for Referral): 
See H&P Past Medical History/Comorbidities:  
Mr. 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) (12/28/2020), and Thyroid disease.   Mr. Benigno Cordova  has a past surgical history that includes hx other surgical; hx colonoscopy; hx heent; hx hernia repair (2015); hx orthopaedic (Left); and ir insert tunl cvc w port over 5 years (1/7/2021). Social History/Living Environment:  
Home Environment: Private residence # Steps to Enter: 4 One/Two Story Residence: Split level # of Interior Steps: 7 Living Alone: No 
Support Systems: Spouse/Significant Other/Partner Patient Expects to be Discharged to[de-identified] Skilled nursing facility Current DME Used/Available at Home: Lacho Wade, Wheelchair Prior Level of Function/Work/Activity: It seems pt was getting some assistance with self care even prior to [facility]. Number of Personal Factors/Comorbidities that affect the Plan of Care: Brief history (0):  LOW COMPLEXITY ASSESSMENT OF OCCUPATIONAL PERFORMANCE[de-identified]  
Activities of Daily Living:  
Basic ADLs (From Assessment) Complex ADLs (From Assessment) Feeding: Maximum assistance Oral Facial Hygiene/Grooming: Total assistance Bathing: Total assistance Upper Body Dressing: Total assistance Lower Body Dressing: Total assistance Toileting: Total assistance(CNA assisted with brief change, total assist. ) Grooming/Bathing/Dressing Activities of Daily Living Cognitive Retraining Safety/Judgement: Awareness of environment Bed/Mat Mobility Rolling: Maximum assistance(can pull his upper body when hand placed on rail) Supine to Sit: Maximum assistance Sit to Supine: Maximum assistance Sit to Stand: (attempted x 2 but unable to stand) Scooting: Total assistance Most Recent Physical Functioning:  
Gross Assessment: 
  
         
  
Posture: 
Posture (WDL): Exceptions to [facility] Posture Assessment: Forward head, Rounded shoulders Balance: 
Sitting: Impaired Sitting - Static: Fair (occasional) Sitting - Dynamic: Poor (constant support) Bed Mobility: 
Rolling: Maximum assistance(can pull his upper body when hand placed on rail) Supine to Sit: Maximum assistance Sit to Supine: Maximum assistance Scooting: Total assistance Wheelchair Mobility: 
  
Transfers: 
Sit to Stand: (attempted x 2 but unable to stand) Patient Vitals for the past 6 hrs: 
 BP SpO2 Pulse 21 0900 128/69 96 % 87  
21 1000 121/69 96 % 84  
21 1100 134/69 96 % 85  
21 1200 (!) 158/80 97 % 86 Mental Status Neurologic State: Drowsy Orientation Level: Oriented to person Cognition: Follows commands(slow with tactile cues) Perception: Appears intact Perseveration: No perseveration noted Safety/Judgement: Awareness of environment Physical Skills Involved: 
Balance Strength Activity Tolerance Cognitive Skills Affected (resulting in the inability to perform in a timely and safe manner): 
Sustained Attention Psychosocial Skills Affected: 
Environmental Adaptation Number of elements that affect the Plan of Care: 3-5:  MODERATE COMPLEXITY CLINICAL DECISION MAKIN[address] [place] [id number] AM-PAC 6 Clicks Daily Activity Inpatient Short Form How much help from another person does the patient currently need. .. Total A Lot A Little None 1. Putting on and taking off regular lower body clothing? [x] 1   [] 2   [] 3   [] 4  
2. Bathing (including washing, rinsing, drying)? [x] 1   [] 2   [] 3   [] 4  
3. Toileting, which includes using toilet, bedpan or urinal?   [x] 1   [] 2   [] 3   [] 4  
4. Putting on and taking off regular upper body clothing? [x] 1   [] 2   [] 3   [] 4  
5. Taking care of personal grooming such as brushing teeth? [x] 1   [] 2   [] 3   [] 4  
6. Eating meals? [x] 1   [] 2   [] 3   [] 4  
© , Trustees of [address] [place] [id number], under license to [employer]. All rights reserved Score:  Initial: 6 Most Recent: X (Date: -- ) Interpretation of Tool:  Represents activities that are increasingly more difficult (i.e. Bed mobility, Transfers, Gait).  
  
Use of outcome tool(s) and clinical judgement create a POC that gives a: LOW COMPLEXITY  
 
 
 
TREATMENT:  
(In addition to Assessment/Re-Assessment sessions the following treatments were rendered) Pre-treatment Symptoms/Complaints:  Pt quiet and without complaint of pain 
Pain: Initial:  
  0 Post Session:  0 Self Care: (25 min): Procedure(s) (per grid) utilized to improve and/or restore self-care/home management as related to dressing, bathing, and toileting. Required maximal verbal, manual, tactile, and   cueing to facilitate activities of daily living skills and compensatory activities. OT evaluation completed Braces/Orthotics/Lines/Etc:  
IV 
ICU monitors and condom catheter O2 Device: Room air Treatment/Session Assessment:   
Response to Treatment:  pt tolerated fair, closes his eyes during parts of his brief change. Interdisciplinary Collaboration:  
Physical Therapist 
Occupational Therapist 
Registered Nurse Certified Nursing Assistant/Patient Care Technician After treatment position/precautions:  
Supine in bed Bed/Chair-wheels locked Bed in low position Call light within reach RN notified Compliance with Program/Exercises: Compliant all of the time, Will assess as treatment progresses. Recommendations/Intent for next treatment session: \"Next visit will focus on reduction in assistance provided\". Total Treatment Duration: OT Patient Time In/Time Out Time In: 1400 Time Out: 1440 Loreta Norman OT

## 2021-03-16 NOTE — CONSULTS
[facility] Consult Note Primary GI Physician: Dr Radha Crook Referring Provider:  Dr Katia Aden Consult Date:  3/16/2021 Admit Date:  3/12/2021 Chief Complaint:  GIB Subjective:  
 
History of Present Illness:  Patient is a 68 y.o. male with PMH of including but not limited to stroke, HLD, HTN, hx of stage 4 lung cancer, thyroid disease, who is seen in consultation at the request of Dr. Katia Aden  for GIB. Pt recently admitted for suspected CVA. He was discharged 3/9/2021. EMS called to rehab facility 3/12 for respiratory distress. He was intubated and brought to ED. Labs with elevated WBC, KEI, hypernatremia. CXR with hazy infiltrate left base. He was started on antibiotics. He was admitted to ICU and started on sedation. He has been made DNR. He was extubated 3/14/21. Currently NPO. Remains on Zosyn. HGB 9.4 (down from 12.1 at discharge 3/9/2021 and 10.9 at admission 3/12/2021) normocytic. PLT 80. BUN 52, Cr 1.19. Underwent swallowing study today. No overt bleeding noted. He is receiving pantoprazole BID. Heparin has been held. Nursing reports that patient had dark stools yesterday and again today. Reports 3-4 today. Stool faintly heme positive today. No family at beside. Pt alert and able to deny any nausea, vomiting, or abdominal pain. He is not able to answer any other questions. Last colonoscopy 3/14/12 with Dr Radha Crook revealed hyperplastic colon polyp and internal hemorrhoids. Recall recommended for 5 years. PMH: 
Past Medical History:  
Diagnosis Date  Acute ischemic stroke ([facility] 3/4/2021  Hypercholesterolemia  Hypertension   
 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) 12/28/2020  Thyroid disease   
 partial thyroid PSH: 
Past Surgical History:  
Procedure Laterality Date  HX COLONOSCOPY    
 HX HEENT    
 part of thyroid removed  HX HERNIA REPAIR  1235  
 umbilical  
 HX ORTHOPAEDIC Left   
 bunion removal on left great toe.  HX OTHER SURGICAL    
 thyroid removed (1)  IR INSERT TUNL CVC W PORT OVER 5 YEARS  1/7/2021 Allergies: 
No Known Allergies Home Medications: 
Prior to Admission medications Medication Sig Start Date End Date Taking? Authorizing Provider  
dexAMETHasone (DECADRON) 4 mg tablet One tab three times a day 3/1/21   Jerome Mcmanus MD  
folic acid (FOLVITE) 1 mg tablet Take 1 Tab by mouth daily. 2/8/21   Negro Florentino NP  
prochlorperazine (Compazine) 10 mg tablet Take 1 Tab by mouth every six (6) hours as needed for Nausea or Vomiting for up to 30 days. Indications: nausea and vomiting caused by cancer drugs 1/12/21 2/11/21  Jerome Mcmanus MD  
ondansetron [facility] ODT) 8 mg disintegrating tablet Take 1 Tab by mouth every eight (8) hours as needed for Nausea or Vomiting. Indications: prevent nausea and vomiting from cancer chemotherapy 1/12/21   Jerome Mcmanus MD  
furosemide (LASIX) 20 mg tablet Take 1 Tab by mouth daily. Indications: accumulation of fluid resulting from chronic heart failure 12/29/20   Jerome Mcmanus MD  
amLODIPine (NORVASC) 5 mg tablet Take 5 mg by mouth daily. Provider, Historical  
cyanocobalamin (VITAMIN B-12) 1,000 mcg sublingual tablet Take 1,000 mcg by mouth every morning. Provider, Historical  
Cholecalciferol, Vitamin D3, (VITAMIN D3) 1,000 unit cap Take  by mouth every morning. Provider, Historical  
Thyroid, Pork, (ARMOUR THYROID) 120 mg Tab Take 150 mg by mouth daily. Takes 1 (120 mg) + 1 (30 mg) tab daily    Provider, Historical  
 
 
Hospital Medications: 
Current Facility-Administered Medications Medication Dose Route Frequency  pantoprazole (PROTONIX) 40 mg in 0.9% sodium chloride 10 mL injection  40 mg IntraVENous Q12H  
 amLODIPine (NORVASC) tablet 5 mg  5 mg Oral DAILY  thyroid (Pork) (ARMOUR) tablet 150 mg  150 mg Oral DAILY  dexamethasone (DECADRON) 4 mg/mL injection 4 mg  4 mg IntraVENous Q8H  
 insulin regular (NOVOLIN R, HUMULIN R) injection   SubCUTAneous Q6H  
 sodium chloride (NS) flush 5-40 mL  5-40 mL IntraVENous Q8H  
 sodium chloride (NS) flush 5-40 mL  5-40 mL IntraVENous PRN  
 acetaminophen (TYLENOL) tablet 650 mg  650 mg Oral Q6H PRN Or  
 acetaminophen (TYLENOL) suppository 650 mg  650 mg Rectal Q6H PRN  polyethylene glycol (MIRALAX) packet 17 g  17 g Oral DAILY PRN  
 ondansetron (ZOFRAN) injection 4 mg  4 mg IntraVENous Q6H PRN  piperacillin-tazobactam (ZOSYN) 4.5 g in 0.9% sodium chloride (MBP/ADV) 100 mL MBP  4.5 g IntraVENous Q8H  
 sodium chloride (NS) flush 5-10 mL  5-10 mL IntraVENous PRN  
 dextrose 5% infusion  75 mL/hr IntraVENous CONTINUOUS  
 [Held by provider] heparin (porcine) injection 5,000 Units  5,000 Units SubCUTAneous Q12H  
 NUTRITIONAL SUPPORT ELECTROLYTE PRN ORDERS   Does Not Apply PRN Social History: 
Social History Tobacco Use  Smoking status: Former Smoker Packs/day: 1.50 Years: 30.00 Pack years: 45.00 Quit date: 1986 Years since quittin.2  Smokeless tobacco: Former User  Tobacco comment: quit  Substance Use Topics  Alcohol use: Yes Alcohol/week: 1.0 standard drinks Types: 1 Glasses of wine per week Comment: daily Pt denies any history of drug use, blood transfusions, or tattoos. Family History: 
Family History Problem Relation Age of Onset  Heart Disease Brother 48 MI  
 Alzheimer Mother  Heart Disease Brother  Pacemaker Brother  Diabetes Sister  Alzheimer Sister  Alcohol abuse Brother  Alzheimer Brother  Diabetes Brother  Alcohol abuse Brother  Alzheimer Brother Review of Systems: A detailed 10 system ROS is obtained, with pertinent positives as listed above. All others are negative.  
 
Diet:   
 
Objective:  
 
Physical Exam: 
Vitals: Visit Vitals BP (!) 158/80 Pulse 86 Temp 97.3 °F (36.3 °C) Resp 25 Ht 6' (1.829 m) Wt 89.8 kg (198 lb) SpO2 97% BMI 26.85 kg/m² Gen:  Pt is alert, cooperative, no acute distress. Chronically ill appearing. Skin:  Extremities and face reveal no rashes. Ecchymoses noted bilateral UE. HEENT: Sclerae anicteric. Extra-occular muscles are intact. No oral ulcers. No abnormal pigmentation of the lips. The neck is supple. Cardiovascular: Regular rate and rhythm. No murmurs, gallops, or rubs. Respiratory:  Comfortable breathing with no accessory muscle use. Clear breath sounds anteriorly with no wheezes, rales, or rhonchi. GI:  Abdomen nondistended, soft, and nontender. Normal active bowel sounds. No enlargement of the liver or spleen. No masses palpable. Rectal:  Deferred Musculoskeletal:  2+ pitting edema bilateral LE. Neurological:    Patient is alert and oriented to person/place. Lymphatic:  No cervical or supraclavicular adenopathy. Laboratory:   
Recent Labs  
  03/16/21 
0440 03/15/21 
0446 03/14/21 
2320 03/14/21 
1625 03/14/21 
1053 03/14/21 
0326 03/13/21 
2341 03/13/21 
1809 WBC 12.3* 13.1*  --   --   --  12.8*  --   --   
HGB 9.4* 8.9*  --   --   --  9.2*  --   --   
HCT 28.7* 27.4*  --   --   --  28.9*  --   --   
PLT 80* 80*  --   --   --  71*  --   -- MCV 91.4 91.6  --   --   --  91.7  --   --   
* 147* 147* 147* 150* 152* 151* 153* K 3.3* 3.5  --   --   --  4.1  --   --   
* 115*  --   --   --  121*  --   --   
CO2 26 26  --   --   --  25  --   --   
BUN 52* 75*  --   --   --  111*  --   --   
CREA 1.19 1.62*  --   --   --  2.26*  --   --   
CA 8.1* 7.9*  --   --   --  8.1*  --   --   
MG  --   --  2.6*  --   --   --   --   --   
* 242*  --   --   --  191*  --   --   
  
 
 
Assessment:  
 
Principal Problem: 
  Acute hypoxemic respiratory failure ([facility] (3/12/2021) Active Problems: 
  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 male with PMH of including but not limited to stroke, HLD, HTN, hx of stage 4 lung cancer, thyroid disease, who is seen in consultation at the request of Dr. Max Ward  for GIB. Pt recently admitted for suspected CVA. He was discharged 3/9/2021. EMS called to rehab facility 3/12 for respiratory distress. He was intubated and brought to ED. Admitted to hospitalist for respiratory failure. Remains on antibiotics. HGB dropped from 10.9 at admission to 9.4 today. Nursing staff reports dark faintly heme positive stools. Pt weak and unable to answer many questions. Plan:  
 
-Monitor HGB and transfuse if HGB <8.5 
-Will change to PPI gtt 
-will follow-no plans for endoscopic procedures at this time. ANTONINO Atkins Patient is seen and examined in collaboration with Dr. Colin Barragan. Assessment and plan as per Dr. Colin Barragan. ATTENDING NOTE:  I have seen and examined the patient along with my NP/PA. The assessment and plan above is my own. She has mul Norm Palmer MD

## 2021-03-16 NOTE — PROGRESS NOTES
Critical Care Daily Progress Note: 3/16/2021 Marcos Hughes Admission Date: 3/12/2021 The patient's chart is reviewed and the patient is discussed with the staff. 68 y.o. male seen and evaluated at the request of Dr. Leslie Carter. He has a PMH of HTN, hypothyroidism, former ETOH and Stage IV adenocarcinoma of the lung. He is a former smoker, quit in 1987. He smoked for 25 years 1.5ppd. He is known to our office after a CT of the chest revealed a R paratracheal mass and R pleural effusion. A thoracentesis was done 12/2020 and revealed malignant cells. A PET scan showed primary R suprahilar and perihilar bronchogenic carcinoma as well as a R adrenal mass and FDG avid mediastinal and paraesophageal metastatic lymphadenopathy. He underwent an addition thoracentesis on 1/7/21 with 2L removed on R side. Adrenal biopsy and additional thoracentesis with 450cc removed on R side on 1/13. He began chemotherapy in January. An MRI was completed and showed extension of a R paraspinal lesion at T5-T6 with extension into spinal canal through neuroforamen. He then began XRT. He recently had an admission on 3/4-3/9 concerning for CVA, CT head and MRI was negative for acute stroke, hemorrhage or metastasis. He has now been admitted afte EMS was called to rehab facility due to patient being tachypneic and unable to speak in full sentences. While in route to ER he became lethargic and had decreased respirations and was intubated by EMS. The wife stated the patient has had difficulty swallowing and has not been eating or drinking well for weeks. WBC 16.2 with L shift, Cr elevated at 2.9, Na 159, trop 162.6. ABG with pH 7.4, PCO2 37.2 PO2 112. EKG SR with 1st AVB. He was extubated on 3/14. ST has been consulted. Blood cultures are negative. UA is + but no culture done. D5W for hyperglycemia. Heparin on hold due to drop in platelets. Duplex of legs negative. Chest CT with GGO in RLL. Subjective:  
Extremely weak. On RA, but very difficult trying to speak. On medsurg status, but no bed was available for transfer. Current Facility-Administered Medications Medication Dose Route Frequency  potassium chloride 20 mEq in 100 ml IVPB  20 mEq IntraVENous Q2H  
 amLODIPine (NORVASC) tablet 5 mg  5 mg Oral DAILY  thyroid (Pork) (ARMOUR) tablet 150 mg  150 mg Oral DAILY  dexamethasone (DECADRON) 4 mg/mL injection 4 mg  4 mg IntraVENous Q8H  
 famotidine (PF) (PEPCID) 20 mg in 0.9% sodium chloride 10 mL injection  20 mg IntraVENous Q12H  
 insulin regular (NOVOLIN R, HUMULIN R) injection   SubCUTAneous Q6H  
 sodium chloride (NS) flush 5-40 mL  5-40 mL IntraVENous Q8H  
 sodium chloride (NS) flush 5-40 mL  5-40 mL IntraVENous PRN  
 acetaminophen (TYLENOL) tablet 650 mg  650 mg Oral Q6H PRN Or  
 acetaminophen (TYLENOL) suppository 650 mg  650 mg Rectal Q6H PRN  polyethylene glycol (MIRALAX) packet 17 g  17 g Oral DAILY PRN  
 ondansetron (ZOFRAN) injection 4 mg  4 mg IntraVENous Q6H PRN  piperacillin-tazobactam (ZOSYN) 4.5 g in 0.9% sodium chloride (MBP/ADV) 100 mL MBP  4.5 g IntraVENous Q8H  
 sodium chloride (NS) flush 5-10 mL  5-10 mL IntraVENous PRN  
 dextrose 5% infusion  75 mL/hr IntraVENous CONTINUOUS  
 [Held by provider] heparin (porcine) injection 5,000 Units  5,000 Units SubCUTAneous Q12H  
 NUTRITIONAL SUPPORT ELECTROLYTE PRN ORDERS   Does Not Apply PRN Objective:  
 
Vitals:  
 03/16/21 0200 03/16/21 0400 03/16/21 0433 03/16/21 9795 BP: 118/66 113/68  114/64 Pulse: 69 68  89 Resp: 28 (!) 39  26 Temp:  98 °F (36.7 °C) 98 °F (36.7 °C) 98.2 °F (36.8 °C) SpO2: 95% 97%  96% Weight:   198 lb (89.8 kg) Height:      
 
 
Intake and Output:  
03/14 1901 - 03/16 0700 In: 1792.9 [I.V.:1792.9] Out: 2835 [Urine:2835] No intake/output data recorded.  
 
Physical Exam:         
CONSTITUTIONAL:  the patient is well developed and in no acute distress HEENT:  Sclera clear, pupils equal, oral mucosa moist 
RESPIRATORY: clear but decreased on the left. Now on room air CARDIOVASCULAR  RRR without M,G,R 
ABD/GI: soft and non-tender; with positive bowel sounds. EXT: warm without cyanosis. There is 2= lower leg edema in the left leg. SKIN:  no jaundice or rashes, no wounds NEURO: slow mentation. Able to answer some questions but confused. MUSCULOSKELETAL: moves all four extremities with equal strengthn but he is very weak. No deformities ROS: Review of Systems - unable to obtain due to weak voice and confusion LINES: port, rodgers DRIPS:   D5W 
 
CHEST XRAY:  
 
 
CT: 1. Right suprahilar 4.4 cm mass concerning for primary malignancy with numerous 
subpleural and pleural nodules of the right hemithorax indicative of pleural 
metastatic disease. Additionally there are bilateral solid appearing adrenal 
nodule suspicious for adrenal metastases, unchanged. 2. Secretions are evident within the trachea with tree-in-bud groundglass 
opacities of the right lower lobe superior segment, likely aspiration pneumonia. 
  
LAB Recent Labs  
  03/16/21 
0440 03/15/21 
0446 03/14/21 
0326 WBC 12.3* 13.1* 12.8* HGB 9.4* 8.9* 9.2* HCT 28.7* 27.4* 28.9*  
PLT 80* 80* 71* Recent Labs  
  03/16/21 0440 03/15/21 
0446 03/14/21 
2320 03/14/21 
0326 03/14/21 
0326 * 147* 147*   < > 152* K 3.3* 3.5  --   --  4.1 * 115*  --   --  121* CO2 26 26  --   --  25 * 242*  --   --  191* BUN 52* 75*  --   --  111* CREA 1.19 1.62*  --   --  2.26* MG  --   --  2.6*  --   --   
PHOS  --   --  4.4*  --   --   
CA 8.1* 7.9*  --   --  8.1*  
 < > = values in this interval not displayed. Assessment:  (Medical Decision Making) Hospital Problems  Date Reviewed: 3/5/2021 Codes Class Noted POA Gram-positive cocci bacteremia ICD-10-CM: R78.81 ICD-9-CM: 790.7, 041.89  3/13/2021 Yes 1 of 4 - staph - suspect contaminant  * (Principal) Acute hypoxemic respiratory failure ([facility] ICD-10-CM: J96.01 
ICD-9-CM: 518.81  3/12/2021 Yes Successful extubation yesterday HCAP (healthcare-associated pneumonia) ICD-10-CM: J18.9 ICD-9-CM: 382  3/12/2021 Yes  
 ? aspiration HTN (hypertension) (Chronic) ICD-10-CM: I10 
ICD-9-CM: 401.9  3/12/2021 Yes  
 controlled Acquired hypothyroidism (Chronic) ICD-10-CM: E03.9 ICD-9-CM: 244.9  3/12/2021 Yes On Tx Severe sepsis (HCC) ICD-10-CM: A41.9, R65.20 ICD-9-CM: 038.9, 995.92  3/12/2021 Unknown Per hospitalist  
 Hypernatremia ICD-10-CM: E87.0 ICD-9-CM: 276.0  3/12/2021 Yes Persistent - on D5W - suspect volume depletion KEI (acute kidney injury) ([facility] ICD-10-CM: N17.9 ICD-9-CM: 584.9  3/12/2021 Yes Improving with hydration Mild protein-calorie malnutrition (HCC) (Chronic) ICD-10-CM: E44.1 ICD-9-CM: 263.1  3/12/2021 Yes Wife reported trouble eating at rehab Non-small cell lung cancer (HCC) ICD-10-CM: C34.90 ICD-9-CM: 162.9  1/12/2021 Yes Outpatient chemo and XRT Primary malignant neoplasm of lung metastatic to other site [facility]) ICD-10-CM: C34.90 ICD-9-CM: 162.9  12/28/2020 Yes  
 adenoca Plan:  (Medical Decision Making) 1. Successful extubation yesterday. Now on room air 2. Avoid sedatives - slow to respond 3. MBS today per speech 4. Continue D5W for hypernatremia - Na and renal function improving 5. He is clearly severe failure to thrive. I would recommend discussing hospice with the family. Will be available as needed. Call with questions. Will sign off for now. DNR Luis Lake MD

## 2021-03-17 NOTE — PROGRESS NOTES
1342-TRANSFER - IN REPORT: 
 
Verbal report received from Payal RN(name) on Wilner Massey  being received from ICU(unit) for routine progression of care Report consisted of patients Situation, Background, Assessment and  
Recommendations(SBAR). Information from the following report(s) SBAR, Kardex, Intake/Output, MAR and Recent Results was reviewed with the receiving nurse. Opportunity for questions and clarification was provided. Assessment will be completed upon patients arrival to unit and care assumed. 1850-Bedside shift change report given to [address] [facility] [address] (oncoming nurse) by Mala Steele (offgoing nurse). Report included the following information SBAR, Kardex, Intake/Output, MAR and Recent Results.

## 2021-03-17 NOTE — PROGRESS NOTES
Problem: Diabetes Self-Management Goal: *Disease process and treatment process Description: Define diabetes and identify own type of diabetes; list 3 options for treating diabetes. Outcome: Resolved/Met Goal: *Incorporating nutritional management into lifestyle Description: Describe effect of type, amount and timing of food on blood glucose; list 3 methods for planning meals. Outcome: Resolved/Met Goal: *Incorporating physical activity into lifestyle Description: State effect of exercise on blood glucose levels. Outcome: Resolved/Met Goal: *Developing strategies to promote health/change behavior Description: Define the ABC's of diabetes; identify appropriate screenings, schedule and personal plan for screenings. Outcome: Resolved/Met Goal: *Using medications safely Description: State effect of diabetes medications on diabetes; name diabetes medication taking, action and side effects. Outcome: Resolved/Met Goal: *Monitoring blood glucose, interpreting and using results Description: Identify recommended blood glucose targets  and personal targets. Outcome: Resolved/Met Goal: *Prevention, detection, treatment of acute complications Description: List symptoms of hyper- and hypoglycemia; describe how to treat low blood sugar and actions for lowering  high blood glucose level. Outcome: Resolved/Met Goal: *Prevention, detection and treatment of chronic complications Description: Define the natural course of diabetes and describe the relationship of blood glucose levels to long term complications of diabetes. Outcome: Resolved/Met Goal: *Developing strategies to address psychosocial issues Description: Describe feelings about living with diabetes; identify support needed and support network Outcome: Resolved/Met Goal: *Insulin pump training Outcome: Resolved/Met Goal: *Sick day guidelines Outcome: Resolved/Met Goal: *Patient Specific Goal (EDIT GOAL, INSERT TEXT) Outcome: Resolved/Met Problem: Patient Education: Go to Patient Education Activity Goal: Patient/Family Education Outcome: Resolved/Met Problem: Falls - Risk of 
Goal: *Absence of Falls Description: Document Cemmari Avilez Fall Risk and appropriate interventions in the flowsheet. Outcome: Resolved/Met Problem: Patient Education: Go to Patient Education Activity Goal: Patient/Family Education Outcome: Resolved/Met Problem: Pressure Injury - Risk of 
Goal: *Prevention of pressure injury Description: Document Antelmo Scale and appropriate interventions in the flowsheet. Outcome: Resolved/Met Problem: Patient Education: Go to Patient Education Activity Goal: Patient/Family Education Outcome: Resolved/Met Problem: Patient Education: Go to Patient Education Activity Goal: Patient/Family Education Outcome: Resolved/Met Problem: Patient Education: Go to Patient Education Activity Goal: Patient/Family Education Outcome: Resolved/Met Problem: Patient Education: Go to Patient Education Activity Goal: Patient/Family Education Outcome: Resolved/Met

## 2021-03-17 NOTE — PROGRESS NOTES
Hospitalist Progress Note Admit Date:  3/12/2021 10:58 AM  
Name:  Edi Devlin Age:  68 y.o. 
:  1940 MRN:  [id number] PCP:  Kiana Perez MD 
Treatment Team: Attending Provider: Ky Menjivar MD; Care Manager: Amanda Santana RN; Consulting Provider: Arabella Pelaez MD; Physical Therapy Assistant: Johan Oconnor PTA Presenting Complaint: Shortness of Breath Hospital Course: Mr. Bandar Ibrahim is a 67 y/o WM with a h/o stage 4 lung adenocarcinoma, HTN, HLD and hypothyroid recently admitted for suspected CVA. Discharged on 3/9. EMS called to rehab facility on 3/12 due to respiratory distress. He was intubated in the field and brought here. Labs showed leukocytosis (though recently has been on Decadron), KEI, hypernatremia. CXR showed a developing hazy infiltrate at the left base. Started on antibiotics and sedation. Admitted to ICU. Pulmonology consulted. Started on D5 for hyperNa. His wife has made him DNR should he deteriorate further. CoNS 1/4 bottles, likely contaminant, cultures repeated. Cr and Na improving. Extubated uneventfully on 3/14. Speech following. 24hr Events/Subjective:  
3/17: In bed, very weak appearing, tries to communicate but very weak. Hospice consulted yesterday. Unable to get ROS but pt does follow some simple commands. No other complaints Assessment and Plan:  
 
Hospital Problems as of 3/17/2021 Date Reviewed: 3/5/2021 Codes Class Noted - Resolved POA Gram-positive cocci bacteremia ICD-10-CM: R78.81 ICD-9-CM: 790.7, 041.89  3/13/2021 - Present Yes * (Principal) Acute hypoxemic respiratory failure ([facility] ICD-10-CM: J96.01 
ICD-9-CM: 518.81  3/12/2021 - Present Yes HCAP (healthcare-associated pneumonia) ICD-10-CM: J18.9 ICD-9-CM: 330  3/12/2021 - Present Yes HTN (hypertension) (Chronic) ICD-10-CM: I10 
ICD-9-CM: 401.9  3/12/2021 - Present Yes Acquired hypothyroidism (Chronic) ICD-10-CM: E03.9 ICD-9-CM: 244.9  3/12/2021 - Present Yes Severe sepsis (HCC) ICD-10-CM: A41.9, R65.20 ICD-9-CM: 038.9, 995.92  3/12/2021 - Present Unknown Hypernatremia ICD-10-CM: E87.0 ICD-9-CM: 276.0  3/12/2021 - Present Yes KEI (acute kidney injury) ([facility] ICD-10-CM: N17.9 ICD-9-CM: 584.9  3/12/2021 - Present Yes Mild protein-calorie malnutrition (HCC) (Chronic) ICD-10-CM: E44.1 ICD-9-CM: 263.1  3/12/2021 - Present Yes Non-small cell lung cancer (HCC) ICD-10-CM: C34.90 ICD-9-CM: 162.9  1/12/2021 - Present Yes Primary malignant neoplasm of lung metastatic to other site [facility]) ICD-10-CM: C34.90 ICD-9-CM: 162.9  12/28/2020 - Present Yes Plan: # Acute hypoxemic respiratory failure 
            - In setting of metastatic lung cancer and probable aspiration based on CT scan. Now resolved. Extubated on 3/14 and remains on RA. Con't Zosyn for aspiration. 
  
# Normocytic anemia/thrombocytopenia 
            - Melena and + FOBT. GI consulted, then family opted to pursue Hospice. 
  
# HypoK - Resolved. 
  
# Hypernatremia             - EBCXNDXD. 
  
# Severe sepsis             - Present on admission. Leukocytosis + tachy with suspected infiltrate. Sepsis resolved. CoNS 1/4 bottles, likely contaminant, vanc d/c 3/14, cultures repeated and NTD. Con't Zosyn. 
  
# CoNS bacteremia             - 1/4 bottles, suspected contaminant, vanc dc 3/14, blood cxs repeated and NTD. 
  
# KEI 
            - Pre-renal, now resolved and Cr normal.  
  
# Elevated d-dimer 
            - LE US neg for DVT, could be realted to KEI, infection, hypoxia. 
  
# Hypothyroid 
            - Pork thyroid 
  
# Stage 4 lung cancer DC planning:  Spoke to family on 3/16 and they decided to consult Hospice. Reached out to wife this AM but now answer. Need to clarify if comfort measures and if they wish for him to eat and accept aspiration risk. Diet:  DIET NPO 
DVT ppx: SCDs Objective:  
 
Patient Vitals for the past 24 hrs: 
 Temp Pulse Resp BP SpO2  
03/17/21 0705 97.5 °F (36.4 °C) 75 30 (!) 142/80 97 % 03/17/21 0600  65 19 136/77 96 % 03/17/21 0500  68 20 112/65 96 % 03/17/21 0400  72 19 (!) 147/86 96 % 03/17/21 0352 97.4 °F (36.3 °C) 71 24 138/71 96 % 03/17/21 0300  66 19 119/75 96 % 03/17/21 0200  70 20 112/68 96 % 03/17/21 0100  74 23 (!) 148/77 96 % 03/17/21 0000  71 19 107/67 96 % 03/16/21 2353 97.4 °F (36.3 °C) 78 26 133/81 96 % 03/16/21 2300  75 24 139/79 96 % 03/16/21 2200  74 21 (!) 146/89 97 % 03/16/21 2100  79 24 119/72 97 % 03/16/21 2000  85 (!) 34 131/75 97 % 03/16/21 1947 97.8 °F (36.6 °C) 87 (!) 43 (!) 141/82 97 % 03/16/21 1711  88 30 (!) 151/69 97 % 03/16/21 1435  85 28 129/72 96 % 03/16/21 1200  86 25 (!) 158/80 97 % 03/16/21 1100 97.3 °F (36.3 °C) 85 25 134/69 96 % 03/16/21 1000  84 21 121/69 96 % Oxygen Therapy O2 Sat (%): 97 % (03/17/21 0705) Pulse via Oximetry: 66 beats per minute (03/17/21 0600) O2 Device: Room air (03/16/21 0700) Skin Assessment: Clean, dry, & intact (03/15/21 1100) Skin Protection for O2 Device: N/A (03/15/21 1100) O2 Flow Rate (L/min): 3 l/min (03/15/21 0400) FIO2 (%): 30 % (03/14/21 0802) Estimated body mass index is 27.12 kg/m² as calculated from the following: 
  Height as of this encounter: 6' (1.829 m). Weight as of this encounter: 90.7 kg (200 lb). Intake/Output Summary (Last 24 hours) at 3/17/2021 7755 Last data filed at 3/17/2021 5763 Gross per 24 hour Intake 1053.75 ml Output 365 ml Net 688.75 ml *Note that automatically entered I/Os may not be accurate; dependent on patient compliance with collection and accurate  by [employer]. General:    Well nourished. No overt distress. Weak appearing. CV:   RRR. No m/r/g. No edema. No JVD Lungs:   CTAB. No wheezing, rhonchi, or rales. Unlabored. RA O2. Weak voice. Abdomen:   Soft, nontender, nondistended. Extremities: Warm and dry. No cyanosis. B/l LE pitting edema. Stasis dermatitis. Skin:     No rashes. Normal coloration. Port left upper chest. 
Neuro:  No gross focal deficits. Alert Data Reviewed: 
I have reviewed all labs, meds, and studies from the last 24 hours: 
Recent Results (from the past 24 hour(s)) GLUCOSE, POC Collection Time: 03/16/21  2:05 PM  
Result Value Ref Range Glucose (POC) 210 (H) 65 - 100 mg/dL POC FECAL OCCULT BLOOD Collection Time: 03/16/21  2:28 PM  
Result Value Ref Range Occult blood, stool (POC) Positive (A) NEG    
GLUCOSE, POC Collection Time: 03/16/21  7:38 PM  
Result Value Ref Range Glucose (POC) 201 (H) 65 - 100 mg/dL Performed by [employer] GLUCOSE, POC Collection Time: 03/17/21 12:07 AM  
Result Value Ref Range Glucose (POC) 215 (H) 65 - 100 mg/dL Performed by [employer] GLUCOSE, POC Collection Time: 03/17/21  6:17 AM  
Result Value Ref Range Glucose (POC) 195 (H) 65 - 100 mg/dL Performed by [employer] Current Meds: 
Current Facility-Administered Medications Medication Dose Route Frequency  pantoprazole (PROTONIX) 40 mg in 0.9% sodium chloride 10 mL injection  40 mg IntraVENous Q12H  
 morphine injection 2 mg  2 mg IntraVENous Q4H PRN  
 amLODIPine (NORVASC) tablet 5 mg  5 mg Oral DAILY  thyroid (Pork) (ARMOUR) tablet 150 mg  150 mg Oral DAILY  dexamethasone (DECADRON) 4 mg/mL injection 4 mg  4 mg IntraVENous Q8H  
 insulin regular (NOVOLIN R, HUMULIN R) injection   SubCUTAneous Q6H  
 sodium chloride (NS) flush 5-40 mL  5-40 mL IntraVENous Q8H  
 sodium chloride (NS) flush 5-40 mL  5-40 mL IntraVENous PRN  
 acetaminophen (TYLENOL) tablet 650 mg  650 mg Oral Q6H PRN Or  
 acetaminophen (TYLENOL) suppository 650 mg  650 mg Rectal Q6H PRN  polyethylene glycol (MIRALAX) packet 17 g  17 g Oral DAILY PRN  
 ondansetron (ZOFRAN) injection 4 mg  4 mg IntraVENous Q6H PRN  
 piperacillin-tazobactam (ZOSYN) 4.5 g in 0.9% sodium chloride (MBP/ADV) 100 mL MBP  4.5 g IntraVENous Q8H  
 sodium chloride (NS) flush 5-10 mL  5-10 mL IntraVENous PRN  
 dextrose 5% infusion  75 mL/hr IntraVENous CONTINUOUS  
 [Held by provider] heparin (porcine) injection 5,000 Units  5,000 Units SubCUTAneous Q12H  
 NUTRITIONAL SUPPORT ELECTROLYTE PRN ORDERS   Does Not Apply PRN Other Studies: No results found for this visit on 03/12/21. No results found. All Micro Results Procedure Component Value Units Date/Time CULTURE, BLOOD [[id number]] Collected: 03/14/21 0320 Order Status: Completed Specimen: Blood Updated: 03/17/21 6815 Special Requests: --     
  RIGHT 
HAND Culture result: NO GROWTH 3 DAYS     
 CULTURE, BLOOD [[id number]] Collected: 03/14/21 0326 Order Status: Completed Specimen: Blood Updated: 03/17/21 8073 Special Requests: --     
  RIGHT 
FOREARM Culture result: NO GROWTH 3 DAYS     
 CULTURE, BLOOD [[id number]] Collected: 03/12/21 1219 Order Status: Completed Specimen: Blood Updated: 03/17/21 2251 Special Requests: --     
  LEFT 
PORT Culture result: NO GROWTH 5 DAYS     
 CULTURE, RESPIRATORY/SPUTUM/BRONCH Lala Kayser STAIN [[id number]] Collected: 03/13/21 0526 Order Status: Completed Specimen: Sputum Updated: 03/16/21 0655 Special Requests: NO SPECIAL REQUESTS     
  GRAM STAIN 0 TO 30 WBC'S SEEN PER OIF  
   NO EPITHELIAL CELLS SEEN     
      
  MODERATE GRAM POSITIVE RODS  
     
   FEW GRAM POSITIVE COCCI     
   FEW YEAST FEW GRAM NEGATIVE RODS 4+ MUCUS PRESENT Culture result: MODERATE NORMAL RESPIRATORY JANINA  
     
 CULTURE, BLOOD [[id number]]  (Abnormal) Collected: 03/12/21 1117 Order Status: Completed Specimen: Blood Updated: 03/15/21 0710 Special Requests: NO SPECIAL REQUESTS     
  GRAM STAIN GRAM POSITIVE COCCI AEROBIC AND ANAEROBIC BOTTLES   RESULTS VERIFIED, PHONED TO AND READ BACK BY  ON 3/13/21 @ 0810 BY VALENTINA  
     
  Culture result: STAPHYLOCOCCUS SPECIES, COAGULASE NEGATIVE THIS ORGANISM MAY BE INDICATIVE OF CULTURE CONTAMINATION, HOWEVER, CLINICAL CORRELATION NEEDS TO BE EVALUATED, AS EACH CASE IS UNIQUE. Refer to Blood Culture ID Panel Accession [id number] BLOOD CULTURE ID PANEL [[id number]]  (Abnormal) Collected: 03/12/21 1017 Order Status: Completed Specimen: Blood Updated: 03/13/21 1320 Acc. no. from [employer] [id number] Staphylococcus Detected Comment: RESULTS VERIFIED, PHONED TO AND READ BACK BY 
Rey Holliday RN @ 5759 ON 3/13/21 BY AMM 
  
  
  mecA (Methicillin-Resistance Genes) Detected Comment: Presence of mecA is highly indicative of methicillin resistance. The test does not replace traditional culture and susceptibilities INTERPRETATION Gram positive cocci in clusters. Identified by realtime PCR as  Coagulase negative Staphylococci Comment: A single positive culture of coagulase negative Staph is likely to be a contaminant in adult patients. Consider discontinuation of antibiotics for gram positive bloodstream infections if patient asymptomatic. THIS TEST DOES NOT REPLACE SENSITIVITY TESTING. CULTURE, RESPIRATORY/SPUTUM/BRONCH [facility] [[id number]] Collected: 03/13/21 0426 Order Status: Canceled Specimen: Sputum COVID-19 RAPID TEST [[id number]] Collected: 03/12/21 2120 Order Status: Completed Specimen: Nasopharyngeal Updated: 03/12/21 2154 Specimen source Nasopharyngeal     
  COVID-19 rapid test Not detected Comment:     
The specimen is NEGATIVE for SARS-CoV-2, the novel coronavirus associated with COVID-19. A negative result does not rule out COVID-19. This test has been authorized by the FDA under an Emergency Use Authorization (EUA) for use by authorized laboratories.   
    
Fact sheet for Healthcare Providers: ConventionUpdate.co.nz Fact sheet for Patients: ConventionUpdate.co.nz Methodology: Isothermal Nucleic Acid Amplification SARS-CoV-2 Lab Results \"Novel Coronavirus\" Test: No results found for: COV2NT \"Emergent Disease\" Test: No results found for: EDPR \"SARS-COV-2\" Test: No results found for: XGCOVT Rapid Test:  
Lab Results Component Value Date/Time  COVR Not detected 03/12/2021 09:20 PM  
 
  
 
 
Signed: 
Glen Martinez MD

## 2021-03-17 NOTE — ROUTINE PROCESS
Bedside and Verbal report given to self by Lakeisha Beck RN. Report included SBAR, Kardex, ED Summary, Procedure Summary, Intake and Output and Cardiac Rhythm SR-SB. Patient in bed with bed alarm active.

## 2021-03-17 NOTE — PROGRESS NOTES
TRANSFER - OUT REPORT: 
 
Verbal report given to [facility], RN(name) on Humberto Esparza  being transferred to 3rd floor oncology(unit) for routine progression of care Report consisted of patients Situation, Background, Assessment and  
Recommendations(SBAR). Information from the following report(s) SBAR, Kardex, Procedure Summary, Intake/Output, MAR, Recent Results and Cardiac Rhythm SR with 1st degree HB was reviewed with the receiving nurse. Lines:  
Venous Access Device Upper chest (subclavicular area), left (Active) Central Line Being Utilized Yes 03/17/21 1153 Criteria for Appropriate Use Long term IV/antibiotic administration 03/17/21 1153 Site Assessment Clean, dry, & intact 03/17/21 1153 Date of Last Dressing Change 03/12/21 03/17/21 1153 Dressing Status Clean, dry, & intact 03/17/21 1153 Dressing Type Disk with Chlorhexadine gluconate (CHG); Transparent 03/17/21 1153 Date Accessed (Medial Site) 03/12/21 03/13/21 1930 Access Time (Medial Site) 1118 03/12/21 1127 Access Needle Size (Site #1) 20 G 03/12/21 1127 Access Needle Length (Medial Site) 0.75 inches 03/12/21 1127 Positive Blood Return (Medial Site) Yes 03/16/21 0400 Action Taken (Medial Site) Infusing 03/17/21 1153 Alcohol Cap Used No 03/15/21 2300 Peripheral IV Right Forearm (Active) Site Assessment Clean, dry, & intact 03/17/21 1153 Phlebitis Assessment 0 03/17/21 1153 Infiltration Assessment 0 03/17/21 1153 Dressing Status Clean, dry, & intact 03/17/21 1153 Dressing Type Tape;Transparent 03/17/21 1153 Hub Color/Line Status Yellow; Flushed;Patent 03/17/21 1153 Alcohol Cap Used No 03/16/21 0400 Opportunity for questions and clarification was provided. Patient transported with: 
 [place] Upper dentures from home in pink denture box.

## 2021-03-17 NOTE — PROGRESS NOTES
Spoke to wife, Dylan Neil. Hospice consulted yesterday. Will allow patient to eat pureed diet/thin liquids for pleasure (if able) as recommended by ST yesterday. Transition to comfort care, orders adjusted. Will follow up w CM/Hospice today.  
 
Merced Aden MD

## 2021-03-17 NOTE — HOSPICE
[facility] Patient seen in room 356, transferred out of ICU today and made comfort care. He is sleeping but wakes to voice. He answers questions once given a few minutes to wake fully. He ate a few bites of pureed diet without difficulty but does display weak persistent cough after one sip of iced tea. Denies pain or shortness of breath at this time. He has not received any PRN medications for symptoms today. Spoke with primary nurse Mendoza Drake who will have CNA come to feed patient his dinner tray since he is awake. No family present at bedside. Will reassess in the morning and speak with [facility] MD Dr. Inge Bowser at that time. Thank you for this referral, Rosa Elena Harry, RN, BSN 
[facility] liaison [phone]

## 2021-03-17 NOTE — PROGRESS NOTES
THOMAS spoke with Faiza Saenz, [facility], who reports they are making in-person assessments now. Faiza Saenz will come to the hospital today to review pt. Faiza Saenz states she will call pt's spouse and that there needs to be some clarification on goals of care for this pt. SW following. GIOVANNI CmW

## 2021-03-17 NOTE — PROGRESS NOTES
GI DAILY PROGRESS NOTE Admit Date:  3/12/2021 Today's Date:  3/17/2021 CC: Anemia - ? GIB Subjective:  
 
Patient unchanged. No reports of GIB'ing. Hg 13.1-12.3 Medications:  
Current Facility-Administered Medications Medication Dose Route Frequency  pantoprazole (PROTONIX) 40 mg in 0.9% sodium chloride 10 mL injection  40 mg IntraVENous Q12H  
 morphine injection 2 mg  2 mg IntraVENous Q4H PRN  
 amLODIPine (NORVASC) tablet 5 mg  5 mg Oral DAILY  thyroid (Pork) (ARMOUR) tablet 150 mg  150 mg Oral DAILY  dexamethasone (DECADRON) 4 mg/mL injection 4 mg  4 mg IntraVENous Q8H  
 insulin regular (NOVOLIN R, HUMULIN R) injection   SubCUTAneous Q6H  
 sodium chloride (NS) flush 5-40 mL  5-40 mL IntraVENous Q8H  
 sodium chloride (NS) flush 5-40 mL  5-40 mL IntraVENous PRN  
 acetaminophen (TYLENOL) tablet 650 mg  650 mg Oral Q6H PRN Or  
 acetaminophen (TYLENOL) suppository 650 mg  650 mg Rectal Q6H PRN  polyethylene glycol (MIRALAX) packet 17 g  17 g Oral DAILY PRN  
 ondansetron (ZOFRAN) injection 4 mg  4 mg IntraVENous Q6H PRN  piperacillin-tazobactam (ZOSYN) 4.5 g in 0.9% sodium chloride (MBP/ADV) 100 mL MBP  4.5 g IntraVENous Q8H  
 sodium chloride (NS) flush 5-10 mL  5-10 mL IntraVENous PRN  
 dextrose 5% infusion  75 mL/hr IntraVENous CONTINUOUS  
 [Held by provider] heparin (porcine) injection 5,000 Units  5,000 Units SubCUTAneous Q12H  
 NUTRITIONAL SUPPORT ELECTROLYTE PRN ORDERS   Does Not Apply PRN Review of Systems: A review of system was NOTobtained Objective:  
Vitals: 
Visit Vitals BP (!) 142/80 Pulse 75 Temp 97.5 °F (36.4 °C) Resp 30 Ht 6' (1.829 m) Wt 90.7 kg (200 lb) SpO2 97% BMI 27.12 kg/m² Intake/Output: 
No intake/output data recorded. 03/15 1901 - 03/17 0700 In: 1053.8 [I.V.:1053.8] Out: 565 [AXFWP:244] Exam: Mot examined.    
Data Review (Labs):   
Recent Labs  
  03/16/21 
0440 03/15/21 
0446 03/14/21 
2320 03/14/21 21  03/14/21 
1053 WBC 12.3* 13.1*  --   --   --   
HGB 9.4* 8.9*  --   --   --   
HCT 28.7* 27.4*  --   --   --   
PLT 80* 80*  --   --   -- MCV 91.4 91.6  --   --   --   
* 147* 147* 147* 150*  
K 3.3* 3.5  --   --   --   
* 115*  --   --   --   
CO2 26 26  --   --   --   
BUN 52* 75*  --   --   --   
CREA 1.19 1.62*  --   --   --   
CA 8.1* 7.9*  --   --   --   
* 242*  --   --   --   
 
 
Assessment:  
 
Principal Problem: 
  Acute hypoxemic respiratory failure ([address] (3/12/2021) Active Problems: 
  Primary malignant neoplasm of lung metastatic to other site [facility]) (12/28/2020) Non-small cell lung cancer ([address] (1/12/2021) HCAP (healthcare-associated pneumonia) (3/12/2021) HTN (hypertension) (3/12/2021) Acquired hypothyroidism (3/12/2021) Severe sepsis ([address] (3/12/2021) Hypernatremia (3/12/2021) KEI (acute kidney injury) ([address] (3/12/2021) Mild protein-calorie malnutrition ([address] (3/12/2021) Gram-positive cocci bacteremia (3/13/2021) Plan:  
 
There is no sign of significant GIB'ing Respiratory condition would not allow for safe endoscopy with sedation We will sign off, please set us know if we can help Elizabeth Bejarano MD

## 2021-03-17 NOTE — PROGRESS NOTES
1455-Primary Nurse Gonzalo Marie and Yenny Tadeo, RN performed a dual skin assessment on this patient Impairment noted- see wound doc flow sheet Antelmo score is 8.

## 2021-03-18 NOTE — PROGRESS NOTES
Mr. Hermelindo Guevara is a 69 y/o WM with a h/o stage 4 lung adenocarcinoma, HTN, HLD and hypothyroid recently admitted for suspected CVA. Discharged on 3/9. EMS called to rehab facility on 3/12 due to respiratory distress. He was intubated in the field and brought here. Labs showed leukocytosis (though recently has been on Decadron), KEI, hypernatremia. CXR showed a developing hazy infiltrate at the left base. Started on antibiotics and sedation. Admitted to ICU. Pulmonology consulted. Started on D5 for hyperNa. His wife has made him DNR should he deteriorate further. CoNS 1/4 bottles, likely contaminant, cultures repeated. Cr and Na improving. Extubated uneventfully on 3/14. Speech following Patient care was discussed with his wife who is power of  and patient was transitioned to comfort care on April 17, 2021. Patient is waiting for hospice placement. Today patient looks comfortable but extremely weak. Following simple commands but looks extremely dehydrated. I doubt about patient's p.o. intake. Plan: Continue comfort care. I have called patient's wife and updated her about the plan. Verified comfort care status.

## 2021-03-18 NOTE — HOSPICE
[facility] Patient seen in his room this morning. He is asleep but wakes to voice. Took several bites of breakfast when offered and 2 sips of juice, no coughing noted after swallowing this morning. He denies pain or shortness of breath. Case reviewed with Dr. Janiya Aguilar again, patient continues to meet home hospice criteria but does not qualify for [facility] as he lacks the need for aggressive symptom management at this time. Will continue to reassess. Long conversation by phone with patient's wife explaining hospice levels of care and criteria for [facility]. She cannot take care of him at home and says her son can't help because he lives in [facility] and Adilia Riley has a job and a family. \"  Updated Madison Echavarria, will continue to follow. Thank you for this referral, Renzo Mercado RN, BSN 
[facility] liaison [phone]

## 2021-03-18 NOTE — PROGRESS NOTES
Nutrition Note Received Best Practice Alert for Malnutrition Screening Tool: Recently Lost Weight Without Trying: Yes, If Yes, How Much Weight Loss: Unsure, Eating Poorly Due to Decreased Appetite: Yes See prior RD notes. Pt is currently comfort care. RD signing off. Electronically signed by Estrellita Rabago RD on 3/18/2021 at 2:20 PM 
 
Contact: [phone]

## 2021-03-19 PROBLEM — R78.81 GRAM-POSITIVE COCCI BACTEREMIA: Status: RESOLVED | Noted: 2021-01-01 | Resolved: 2021-01-01

## 2021-03-19 PROBLEM — C34.90 METASTATIC LUNG CANCER (METASTASIS FROM LUNG TO OTHER SITE) (HCC): Status: ACTIVE | Noted: 2021-01-01

## 2021-03-19 NOTE — HOSPICE
[facility] Patient assessed and discussed with Dr. Kadeem Carlos. He approves patient for transfer to [facility] today under [facility] level of care for dyspnea associated with aspiration. Wife Fermin Funes will meet with hospice liaison at 11:30 at the hospital to sign consents prior to transfer. No prescriptions are needed, please leave all lines in place. Thank you for this referral, Amy Schaumann, RN, BSN 
[facility] liaison [phone]

## 2021-03-19 NOTE — PROGRESS NOTES
1335- Patient arrived on the floor via ambulance transport from [facility]. The patient is being admitted to [facility] with [facility] with diagnosis of Stage 4 metastatic lung cancer. Patient has discharge plan to remain in [facility] care until his demise. He is obtunded on arrival with very shallow respirations of 10 minute. He was transferred to the bed from the stretcher and did not respond at all. He has oxygen on at 2 L via N/C. Patient has coarse lung sounds throughout. He has positive bowel sounds. His buttocks is discolored in sacral area. No skin breakdown. Patient has a bandage to his left upper arm and a small skin tear to his left lower arm. The bed is low and locked with two bed rails up and the tab alert in place. The door to his room is left open for close observation. 1430- The patient continues to rest with no signs of distress observed.      Reported off to Corine Sabillon

## 2021-03-19 NOTE — PROGRESS NOTES
TRANSFER - OUT REPORT: 
 
Verbal report given to Mando(name) on Zully Cuevas  being transferred to Hospice(unit) for change in patient condition(comfort care) Report consisted of patients Situation, Background, Assessment and  
Recommendations(SBAR). Information from the following report(s) SBAR was reviewed with the receiving nurse. Lines:  
Venous Access Device Upper chest (subclavicular area), left (Active) Cathlyn Fraise Being Utilized Yes 03/19/21 5734 Criteria for Appropriate Use Limited/no vessel suitable for conventional peripheral access 03/19/21 0322 Site Assessment Clean, dry, & intact 03/19/21 0322 Date of Last Dressing Change 03/12/21 03/19/21 3188 Dressing Status Clean, dry, & intact 03/19/21 0322 Dressing Type Disk with Chlorhexadine gluconate (CHG); Transparent 03/19/21 0322 Date Accessed (Medial Site) 03/12/21 03/19/21 7280 Access Time (Medial Site) 1118 03/12/21 1127 Access Needle Size (Site #1) 20 G 03/12/21 1127 Access Needle Length (Medial Site) 0.75 inches 03/12/21 1127 Positive Blood Return (Medial Site) Yes 03/19/21 0322 Action Taken (Medial Site) Flushed 03/19/21 1478 Date Needle Changed (Medial Site) 03/12/21 03/19/21 6641 Alcohol Cap Used No 03/15/21 2300 Peripheral IV Right Forearm (Active) Site Assessment Clean, dry, & intact 03/18/21 1924 Phlebitis Assessment 0 03/18/21 1924 Infiltration Assessment 0 03/18/21 1924 Dressing Status Clean, dry, & intact 03/18/21 1924 Dressing Type Tape;Transparent 03/18/21 1924 Hub Color/Line Status Flushed;Patent 03/18/21 1924 Alcohol Cap Used No 03/18/21 1924 Opportunity for questions and clarification was provided. Patient transported with: 
 Port needle and dressing changed. Due to be changed on 3/26/21.

## 2021-03-19 NOTE — PROGRESS NOTES
Care Management Interventions Palliative Care Criteria Met (RRAT>21 & CHF Dx)?: No(Dx resp failure, Risk 26%) Transition of Care Consult (CM Consult): Discharge Planning Discharge Durable Medical Equipment: No(walker) Physical Therapy Consult: Yes Occupational Therapy Consult: Yes Speech Therapy Consult: Yes Current Support Network: Lives with [facility] The Plan for Transition of Care is Related to the Following Treatment Goals : debility The Patient and/or Patient Representative was Provided with a Choice of Provider and Agrees with the Discharge Plan?: Yes Name of the Patient Representative Who was Provided with a Choice of Provider and Agrees with the Discharge Plan: wife Freedom of Choice List was Provided with Basic Dialogue that Supports the Patient's Individualized Plan of Care/Goals, Treatment Preferences and Shares the Quality Data Associated with the Providers?: Yes Discharge Location Discharge Placement: Other: 
 
 
THOMAS informed by Jose Guzman liaison that pt is approved for admission to [facility] today room 113. Hina Atkinson asked for transport at 12:30. Pt's spouse will meet with Hina Atkinson at 11:30 to sign consents. SW arranged transport.

## 2021-03-19 NOTE — PROGRESS NOTES
[address] [place]  Report received from [place]. Pt was admitted today from [facility] as GIP status with hopsice dx of stage 4 metastatic lunch cancer and symptom management of pain, agitation, dyspnea. Pt was unable to answer questions and no person accompanied him on admission. 12 Pt is currently resting with his eyes closed but he did open them slightly when his name was called and them immediately closed them. Pt was medicated with scheduled dose of Decadron via left chest port which has good blood return and flushes easily. Admission assessment completed. Bed is low and locked, call bell within reach, tab alert in place, side rails up x2. Door remains open for continued monitoring. Currently there is no family at the bedside. 1600 non adherent dressing with guaze wrap on left arm skin tear. Pt is resting with his eyes closed, FLACC 0/10. Safety measures in place. 1800 Pt continues to rest with his eyes closed. No signs or symptoms of pain or distress at this time, FLACC 0/10. Safety measures in place. Door remains open for continued monitoring. Report given to Lorena Novak RN.

## 2021-03-19 NOTE — DISCHARGE SUMMARY
Hospitalist Discharge Summary     Admit Date:  3/12/2021 10:58 AM   DC note date: 3/19/2021  Name:  Ewa Marin   Age:  68 y.o.  :  1940   MRN:  [id number]   PCP:  Mayte Zapata MD  Treatment Team: Attending Provider: Zhen Way MD; Care Manager: Edy Alcantar RN    Problem List for this Hospitalization:  Hospital Problems as of 3/19/2021 Date Reviewed: 3/5/2021          Codes Class Noted - Resolved POA    * (Principal) Acute hypoxemic respiratory failure ([facility] ICD-10-CM: J96.01  ICD-9-CM: 518.81  3/12/2021 - Present Yes        HCAP (healthcare-associated pneumonia) ICD-10-CM: J18.9  ICD-9-CM: 486  3/12/2021 - Present Yes        HTN (hypertension) (Chronic) ICD-10-CM: I10  ICD-9-CM: 401.9  3/12/2021 - Present Yes        Acquired hypothyroidism (Chronic) ICD-10-CM: E03.9  ICD-9-CM: 244.9  3/12/2021 - Present Yes        Severe sepsis ([facility] ICD-10-CM: A41.9, R65.20  ICD-9-CM: 038.9, 995.92  3/12/2021 - Present Unknown        Hypernatremia ICD-10-CM: E87.0  ICD-9-CM: 276.0  3/12/2021 - Present Yes        KEI (acute kidney injury) ([facility] ICD-10-CM: N17.9  ICD-9-CM: 584.9  3/12/2021 - Present Yes        Mild protein-calorie malnutrition ([facility] (Chronic) ICD-10-CM: E44.1  ICD-9-CM: 263.1  3/12/2021 - Present Yes        Non-small cell lung cancer ([facility] ICD-10-CM: C34.90  ICD-9-CM: 162.9  2021 - Present Yes        Primary malignant neoplasm of lung metastatic to other site [facility]) ICD-10-CM: C34.90  ICD-9-CM: 162.9  2020 - Present Yes        RESOLVED: Gram-positive cocci bacteremia ICD-10-CM: R78.81  ICD-9-CM: 790.7, 041.89  3/13/2021 - 3/19/2021 Yes                Mr. Yuli Blas is a 67 y/o WM with a h/o stage 4 lung adenocarcinoma, HTN, HLD and hypothyroid recently admitted for suspected CVA. Discharged on 3/9. EMS called to rehab facility on 3/12 due to respiratory distress. He was intubated in the field and brought here.  Labs showed leukocytosis (though recently has been on Decadron), KEI, hypernatremia. CXR showed a developing hazy infiltrate at the left base. Started on antibiotics and sedation. Admitted to ICU. Pulmonology consulted. Started on D5 for hyperNa. His wife has made him DNR should he deteriorate further. CoNS 1/4 bottles, likely contaminant, cultures repeated. Cr and Na improving. Extubated uneventfully on 3/14.      Patient care was discussed with his wife who is power of  and patient was transitioned to comfort care on April 17, 2021.      Today patient looks comfortable but extremely weak. Following simple commands but looks extremely dehydrated. I doubt about patient's p.o. intake.     Patient got accepted to hospice house and being discharged to hospice house on comfort care. I have talked to patient's wife and updated her about the plan. CODE STATUS verified again.       Disposition: Hospice/Medical FacilityHospice house  Activity: Activity as tolerated  Diet: DIET PUREED  Code Status: DNR    Follow Up Orders: Follow-up Appointments   Procedures    FOLLOW UP VISIT Appointment in: Other (Specify) Per hospice house     Per hospice house     Standing Status:   Standing     Number of Occurrences:   1     Order Specific Question:   Appointment in     Answer: Other (Specify)       Follow-up Information     Follow up With Specialties Details Why Contact Info    Dora Madsen MD Cardiology   [address] [place] [address]  [phone]      Rudy Howard MD Geriatric Medicine   [address] [place] [address] Dr [id number] [phone]            Discharge meds at bottom of this note. Plan was discussed with patient's wife. All questions answered. Patient was stable at time of discharge. Given instructions to call a physician or return if any concerns. Discharge summary and encounter summary was sent to PCP electronically via \"Comm Mgt\" link in [employer] [facility], if possible.     Diagnostic Imaging/Tests:   Xr Chest Sngl V    Result Date: 3/13/2021  EXAM: XR CHEST SNGL V HISTORY: intubated, respiratory failure. TECHNIQUE: A single frontal view of the chest was submitted. COMPARISON: Multiple prior studies with most recent on FINDINGS: The endotracheal tube remains in place as does the left MediPort. There has been placement of a nasogastric tube. The distal tip is not included on this film. The side-port is seen and may be near the gastroesophageal junction; however, the left hemidiaphragm is silhouetted making it difficult to clearly demonstrate its location. Soft tissue density is again seen in the right upper lobe along with significant superior pleural thickening. No other significant interval changes. Findings as above. Xr Chest Pa Lat    Result Date: 3/4/2021  Exam: XR CHEST PA LAT on 3/4/2021 12:55 PM Clinical History: The Male patient is 68years old  presenting for chest pain. Comparison:  Chest x-ray 1/6/2021 Findings:  Frontal and lateral views of the chest were obtained. There is minimal residual right apical and lateral pleural thickening however with a resolved right basilar effusion. Underlying changes of COPD are seen with right upper lobe paramediastinal mass having decreased in size to 6.7 x 5.0 cm. Left subclavian chest port is in place. The cardiomediastinal silhouette is within normal limits. There are no acute osseous abnormalities. 1. Interval slightly decreasing right upper lobe paramediastinal mass with resolved right basilar effusion however residual pleural thickening along right apical lateral lung segment. CPT code(s) 48205     Xr Swallow Func Video    Result Date: 3/18/2021  MODIFIED BARIUM SWALLOW. HISTORY: Dysphagia. Feeding difficulties. TECHNIQUE: Fluoroscopic guidance was provided for the speech pathologist. Multiple consistencies of barium were given. FINDINGS: Poor swallowing with prolonged oral phase. Laryngeal penetration. No merced tracheal aspiration.  Please see speech pathologist report for further details. Fluoroscopy time: 5.5 minutes. Static images: 17. Poor swallowing with prolonged oral phase with laryngeal penetration. No merced tracheal aspiration. Please see speech pathologist report for further details. Xr Swallow Func Video    Result Date: 3/10/2021  MODIFIED BARIUM SWALLOW. HISTORY: Dysphagia. Feeding difficulties. TECHNIQUE: Fluoroscopic guidance was provided for the speech pathologist. Multiple consistencies of barium were given. FINDINGS: Negative for tracheal aspiration. Please see speech pathologist report for further details. Fluoroscopy time: 3.0 minutes. Static images: None. Negative for tracheal aspiration. Please see speech pathologist report for further details. Mri Brain W Wo Cont    Result Date: 3/4/2021  MRI BRAIN WITHOUT and WITH CONTRAST. HISTORY:  Acute confusion right-sided facial droop. Small cell lung carcinoma. COMPARISON:  None. Study performed within 24 hours of admission. TECHNIQUE:  Sagittal T1, axial T1, T2, FLAIR, gradient echo, diffusion with ADC map were followed by 19cc intravenous gadolinium after which axial and coronal T1 images were repeated. Intravenous contrast was given to increase specificity of T2 abnormalities. FINDINGS: -Diffusion images: No any areas of restricted diffusion to suggest acute infarction.  -No midline shift, mass or mass effect. -Gradient echo images: are unremarkable. -FLAIR sequence images: Fairly extensive bilateral centrum semiovale and corona radiata white matter hyperintensities. -No evidence of acute hemorrhage.  -Lateral ventricles are: normal size.  -Pituitary and parasellar structures: unremarkable on the sagittal T1 images.  -There are normal T2 flow-voids in the major vessels.   -Posterior fossa structures are unremarkable.  -Basal ganglia: appear symmetric.  -Orbits: are grossly normal. -Paranasal sinuses: are clear. -Contrast: No enhancing nodules or masses. -Other: None.      1) Negative for infarction. 2) Negative for metastatic disease. No enhancing lesions. 3) Nonspecific, nonenhancing FLAIR white matter hyperintensities, likely chronic small vessel disease. Mri Thorac Spine W Wo Cont    Result Date: 2/17/2021  MRI thoracic spine with and without contrast HISTORY: Lung carcinoma. Paraspinal mass with spinal canal invasion noted on PET scanning. TECHNIQUE: Multiplanar multisequence imaging of the thoracic spine was obtained on a 1.5 Lima magnet, utilizing the uneventful administration of 19 mL of intravenous Dotarem in order to better evaluate for spinal pathology. COMPARISON: None. Correlation is made to the PET scan dated 02/16/2021. FINDINGS: The vertebral body height and alignment are well-maintained. The disc space heights are well-preserved. There is thickened and enhancing pleural nodularity within the visualized right hemithorax predominating at the mid and superior aspects. There is contiguous enhancement into the neural foramen of T5-6 where there is severe foraminal narrowing. Similar but less impressive findings are present at T4-5. There is abnormal enhancement extending into the right aspect of the spinal canal at the level of T5 measuring up to 2.6 cm in craniocaudal extent. There is mild leftward displacement of the cord but without definite cord compression. The dominant component of the mass including the pleural/paraspinal component measures approximately 4.1 x 4.2 cm. There is associated osseous signal abnormality and enhancement involving portions of the T5 vertebral body and posterior elements. The thoracic cord is otherwise normal in signal intensity with incidental dilatation of the central canal at the mid thoracic levels. A right suprahilar mass measures approximately 4.4 x 4.7 cm corresponding to the known lung neoplasm. 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  Epidural tumor at T5 slightly displaces the cord to the left however there is no definite cord compression. Ct Chest Wo Cont    Result Date: 3/14/2021  EXAM: Chest CT without contrast. INDICATION: Respiratory failure with stage IV adenocarcinoma of the lung. COMPARISON: Chest CT dated December 04, 2020. Multiple axial images were obtained through the chest.  Radiation dose reduction techniques were used for this study: All CT scans performed at this facility use one or all of the following: Automated exposure control, adjustment of the mA and/or kVp according to patient's size, iterative reconstruction. FINDINGS: LUNGS/PLEURA: Mucosal secretions are evident within the trachea. There is elevation of the left hemidiaphragm. Supraclavicular pulmonary mass measuring 4.4 x 4 cm in greatest axial dimension with this is for primary malignancy numerous subpleural and pleural-based nodules of the right hemithorax consistent with pleural metastatic disease with a small right pleural effusion, decreased from prior. The largest right-sided pulmonary nodules of the right upper lobe measuring 18 x 13 mm which appears increased in size of the comparison limited given large pleural effusion on prior study. There are peribronchovascular groundglass opacities of the superior segment right lower lobe, likely infectious or inflammatory. MEDIASTINUM: Left-sided chest port with tip terminating in the right atrium. Advanced coronary artery calcific atherosclerosis. Enteric tube with tip terminating in the stomach. Surgical clips with a right-sided thyroid lobectomy. Trace pericardial effusion, likely physiologic. No mediastinal lymphadenopathy. BONES AND SOFT TISSUES: Soft tissue anasarca along the left lateral chest wall. No acute osseous abnormality. UPPER ABDOMEN: Left hepatic lobe 11 mm partially imaged right renal cyst measuring at least 3 cm.  Hemorrhagic versus proteinaceous 3.1 cm and 2.5 cm left renal cyst. Soft tissue nodule the right adrenal gland measuring approximately 21 x 18 mm and nodularity of the left adrenal gland with a nodule measuring approximately 19 x 12 mm, similar to prior. 1. Right suprahilar 4.4 cm mass concerning for primary malignancy with numerous subpleural and pleural nodules of the right hemithorax indicative of pleural metastatic disease. Additionally there are bilateral solid appearing adrenal nodule suspicious for adrenal metastases, unchanged. 2. Secretions are evident within the trachea with tree-in-bud groundglass opacities of the right lower lobe superior segment, likely aspiration pneumonia. Us Abd Comp    Result Date: 3/5/2021  EXAM: Ultrasound of the abdomen. INDICATION: Abdominal distention. COMPARISON: None. TECHNIQUE: A standard protocol complete abdominal ultrasound was performed. FINDINGS: The liver, gallbladder, pancreas, spleen and common bile duct are normal. The common bile duct measures 5 mm in diameter. The kidneys were difficult to measure due to the patient's limited mobility. There is a tiny nonobstructing stone in the midpole of the right kidney. A 2.4 cm simple cyst is seen in the midpole of the left kidney. No hydronephrosis or ascites is identified. There is no abdominal aortic aneurysm. The IVC and portal vein are patent. Incidentally noted is a markedly distended bladder, with a volume of 973 cc.     1. Tiny nonobstructing right kidney stone. 2. Small 2.4 cm left kidney cyst. 3. Markedly distended urinary bladder. The patient's nurse was notified by the ultrasound technologist.     Xr Chest Port    Result Date: 3/12/2021  Exam: XR CHEST PORT on 3/12/2021 11:18 AM Clinical History: The Male patient is 68years old  presenting for intubated. Comparison:  Chest x-ray 3/4/2021 Findings:  Frontal view of the chest was obtained. Endotracheal tube tip is just above the bridgette and should be pulled back at least 3 to 4 cm to ensure optimal functioning.  There is a stable right paratracheal mass with right apical pleural thickening. Slight hazy infiltrate has developed in the left lung base. The cardiomediastinal silhouette is otherwise stable. There are no acute osseous abnormalities. A left jugular chest port is in place. 1. Endotracheal tube tip at bridgette and should be pulled back to ensure optimal functioning. 2. Developing mild hazy infiltrate at left lung base with otherwise stable exam.. CPT code(s) 14911     Ct Code Neuro Head Wo Contrast    Result Date: 3/4/2021  Exam: CT CODE NEURO HEAD WO CONTRAST on 3/4/2021 11:53 AM Clinical History: The Male patient is 68years old  presenting for patient with acute neuro changes. Technique: Thin slice axial CT images through the brain were obtained. All CT scans at this facility are performed using dose reduction/dose modulation techniques, as appropriate the performed exam, including the following: Automated Exposure Control; Adjustment of the mA and/or kV according to patient size (this includes techniques or standardized protocols for targeted exams where dose is matched to indication/reason for exam); and Use of Iterative Reconstruction Technique. Comparison:  Brain MRI 12/30/2020. Findings:  Cerebrum: Age-related cortical involutional changes are seen. There is chronic periventricular white matter disease with lacunae throughout the basal ganglia. No evidence of intracranial hemorrhage, mass, or other space-occupying lesion is seen. There are no abnormal extra-axial fluid collections. Cerebellum: Involutional changes. CSF spaces: The ventricular system is within normal limits. The basilar cisterns are unremarkable. Brainstem: No evidence of ischemia, hemorrhage, or mass. Extracranial tissues: Visualized orbits and extracranial soft tissues are unremarkable. Paranasal sinuses/Mastoids: Well-pneumatized and aerated. . Calvarium: No acute osseous abnormality. 1.  Age-related senescent changes and chronic microvascular disease.  If patient's symptoms persist, further evaluation with MRI should be considered. CPT code 39880     Duplex Lower Ext Venous Bilat    Result Date: 3/13/2021  EXAMINATION: DUPLEX LOWER EXT VENOUS BILAT HISTORY: dvt. TECHNIQUE: Sonographic evaluation of bilateral lower extremities was performed utilizing 2-D grayscale, color flow Doppler imaging, and spectral waveform analysis. COMPARISON: Left lower extremity dated 1/4/2021 FINDINGS: Bilateral common femoral, superficial femoral, popliteal, and greater saphenous veins demonstrate normal compressibility and Doppler waveforms. The saphenofemoral junction is unremarkable. Bilateral posterior tibial and peroneal veins are without evidence of thrombosis. No evidence of deep venous thrombosis within the bilateral lower extremities. Echocardiogram results:  No results found for this visit on 03/12/21.     Procedures done this admission:  * No surgery found *    All Micro Results     Procedure Component Value Units Date/Time    CULTURE, BLOOD [[id number]] Collected: 03/14/21 0320    Order Status: Completed Specimen: Blood Updated: 03/18/21 1208     Special Requests: --        RIGHT  HAND       Culture result: NO GROWTH 4 DAYS       CULTURE, BLOOD [[id number]] Collected: 03/14/21 0326    Order Status: Completed Specimen: Blood Updated: 03/18/21 1208     Special Requests: --        RIGHT  FOREARM       Culture result: NO GROWTH 4 DAYS       CULTURE, BLOOD [[id number]] Collected: 03/12/21 1219    Order Status: Completed Specimen: Blood Updated: 03/17/21 0716     Special Requests: --        LEFT  PORT       Culture result: NO GROWTH 5 DAYS       CULTURE, RESPIRATORY/SPUTUM/BRONCH Yoseph Mckusick STAIN [[id number]] Collected: 03/13/21 0526    Order Status: Completed Specimen: Sputum Updated: 03/16/21 0744     Special Requests: NO SPECIAL REQUESTS        GRAM STAIN 0 TO 30 WBC'S SEEN PER OIF      NO EPITHELIAL CELLS SEEN               MODERATE GRAM POSITIVE RODS            FEW GRAM POSITIVE COCCI         FEW YEAST FEW GRAM NEGATIVE RODS         4+ MUCUS PRESENT        Culture result:       MODERATE NORMAL RESPIRATORY JANINA          CULTURE, BLOOD [[id number]]  (Abnormal) Collected: 03/12/21 1117    Order Status: Completed Specimen: Blood Updated: 03/15/21 0710     Special Requests: NO SPECIAL REQUESTS        GRAM STAIN GRAM POSITIVE COCCI               AEROBIC AND ANAEROBIC BOTTLES                  RESULTS VERIFIED, PHONED TO AND READ BACK BY  ON 3/13/21 @ 0810 BY            Culture result:       STAPHYLOCOCCUS SPECIES, COAGULASE NEGATIVE THIS ORGANISM MAY BE INDICATIVE OF CULTURE CONTAMINATION, HOWEVER, CLINICAL CORRELATION NEEDS TO BE EVALUATED, AS EACH CASE IS UNIQUE. Refer to Blood Culture ID Panel Accession  [id number]      BLOOD CULTURE ID PANEL [[id number]]  (Abnormal) Collected: 03/12/21 1017    Order Status: Completed Specimen: Blood Updated: 03/13/21 1320     Acc. no. from Micro Order [id number]     Staphylococcus Detected        Comment: RESULTS VERIFIED, PHONED TO AND READ BACK BY  Wendy Heath RN @ 1325 ON 3/13/21 BY AMM          mecA (Methicillin-Resistance Genes) Detected        Comment: Presence of mecA is highly indicative of methicillin resistance. The test does not replace traditional culture and susceptibilities        INTERPRETATION       Gram positive cocci in clusters. Identified by realtime PCR as  Coagulase negative Staphylococci           Comment: A single positive culture of coagulase negative Staph is likely to be a contaminant in adult patients. Consider discontinuation of antibiotics for gram positive bloodstream infections if patient asymptomatic. THIS TEST DOES NOT REPLACE SENSITIVITY TESTING.        CULTURE, RESPIRATORY/SPUTUM/BRONCH Guerda Ban [[id number]] Collected: 03/13/21 0426    Order Status: Canceled Specimen: Sputum     COVID-19 RAPID TEST [[id number]] Collected: 03/12/21 2120    Order Status: Completed Specimen: Nasopharyngeal Updated: 03/12/21 2154     Specimen source Nasopharyngeal        COVID-19 rapid test Not detected        Comment:      The specimen is NEGATIVE for SARS-CoV-2, the novel coronavirus associated with COVID-19. A negative result does not rule out COVID-19. This test has been authorized by the FDA under an Emergency Use Authorization (EUA) for use by authorized laboratories. Fact sheet for Healthcare Providers: [employer]  Fact sheet for Patients: [facility]       Methodology: Isothermal Nucleic Acid Amplification               SARS-CoV-2 Lab Results  \"Novel Coronavirus\" Test: No results found for: COV2NT   \"Emergent Disease\" Test: No results found for: EDPR  \"SARS-COV-2\" Test: No results found for: XGCOVT  \"[facility]\" Test: No results found for: RSLT  Rapid Test:   Lab Results   Component Value Date/Time    COVR Not detected 03/12/2021 09:20 PM            Labs: Results:       BMP, Mg, Phos No results for input(s): NA, K, CL, CO2, AGAP, BUN, CREA, CA, GLU, MG, PHOS in the last 72 hours. CBC No results for input(s): WBC, RBC, HGB, HCT, PLT, GRANS, LYMPH, EOS, MONOS, BASOS, IG, ANEU, ABL, SHAMIR, ABM, ABB, AIG, HGBEXT, HCTEXT, PLTEXT, HGBEXT, HCTEXT, PLTEXT in the last 72 hours. LFT No results for input(s): ALT, TBIL, AP, TP, ALB, GLOB, AGRAT in the last 72 hours.     No lab exists for component: SGOT, GPT   Cardiac Testing No results found for: BNPP, BNP, CPK, RCK1, RCK2, RCK3, RCK4, CKMB, CKNDX, CKND1, TROPT, TROIQ   Coagulation Tests Lab Results   Component Value Date/Time    Prothrombin time 14.0 03/04/2021 11:40 AM    Prothrombin time 12.1 (H) 05/30/2013 08:20 AM    Prothrombin time 10.0 04/12/2010 07:20 AM    INR 1.1 03/04/2021 11:40 AM    INR 1.2 05/30/2013 08:20 AM    INR 1.0 04/12/2010 07:20 AM    aPTT 27.5 05/30/2013 08:20 AM    aPTT 25.0 04/12/2010 07:20 AM      A1c Lab Results   Component Value Date/Time    Hemoglobin A1c 6.9 (H) 03/05/2021 04:25 AM      Lipid Panel Lab Results   Component Value Date/Time    Cholesterol, total 161 03/05/2021 04:25 AM    HDL Cholesterol 74 (H) 03/05/2021 04:25 AM    LDL, calculated 37.6 03/05/2021 04:25 AM    VLDL, calculated 49.4 (H) 03/05/2021 04:25 AM    Triglyceride 247 (H) 03/05/2021 04:25 AM    CHOL/HDL Ratio 2.2 03/05/2021 04:25 AM      Thyroid Panel Lab Results   Component Value Date/Time    TSH 0.057 (L) 03/04/2021 03:04 PM    TSH 0.888 02/23/2021 10:19 AM    T4, Free 1.3 03/04/2021 11:50 PM    T4, Free 1.0 01/19/2021 11:52 AM    T3, total 0.61 01/19/2021 11:52 AM        Most Recent UA Lab Results   Component Value Date/Time    Color YELLOW 03/12/2021 03:36 PM    Appearance TURBID 03/12/2021 03:36 PM    Specific gravity 1.015 03/12/2021 03:36 PM    pH (UA) 5.0 03/12/2021 03:36 PM    Protein TRACE (A) 03/12/2021 03:36 PM    Glucose Negative 03/12/2021 03:36 PM    Ketone Negative 03/12/2021 03:36 PM    Bilirubin Negative 03/12/2021 03:36 PM    Blood LARGE (A) 03/12/2021 03:36 PM    Urobilinogen 0.2 03/12/2021 03:36 PM    Nitrites Negative 03/12/2021 03:36 PM    Leukocyte Esterase LARGE (A) 03/12/2021 03:36 PM    WBC >100 (H) 03/12/2021 03:36 PM    RBC 20-50 03/12/2021 03:36 PM    Epithelial cells 0-3 03/12/2021 03:36 PM    Bacteria TRACE 03/12/2021 03:36 PM    Casts 0-3 03/12/2021 03:36 PM    Crystals, urine MODERATE 03/12/2021 03:36 PM    Mucus 1+ (H) 03/12/2021 03:36 PM        No Known Allergies  Immunization History   Administered Date(s) Administered    TB Skin Test (PPD) Intradermal 03/04/2021       All Labs from Last 24 Hrs:  No results found for this or any previous visit (from the past 24 hour(s)).     Discharge Exam:  Patient Vitals for the past 24 hrs:   Temp Pulse Resp BP SpO2   03/19/21 0803 97.7 °F (36.5 °C) 71 18 (!) 163/94 97 %   03/19/21 0337 97.9 °F (36.6 °C) 77 18 137/81 94 %   03/18/21 2255 98 °F (36.7 °C) 90 17 (!) 143/79 95 %   03/18/21 1918 97.9 °F (36.6 °C) 91 18 (!) 151/89 94 %   03/18/21 1515 97.4 °F (36.3 °C) 81 18 (!) 153/82 96 %   03/18/21 1110 97.3 °F (36.3 °C) 78 18 138/72 95 %     Oxygen Therapy  O2 Sat (%): 97 % (03/19/21 0803)  Pulse via Oximetry: 79 beats per minute (03/17/21 1300)  O2 Device: Room air (03/19/21 5113)  Skin Assessment: Clean, dry, & intact (03/15/21 1100)  Skin Protection for O2 Device: N/A (03/15/21 1100)  O2 Flow Rate (L/min): 3 l/min (03/15/21 0400)  FIO2 (%): 30 % (03/14/21 0802)    Estimated body mass index is 27.12 kg/m² as calculated from the following:    Height as of this encounter: 6' (1.829 m). Weight as of this encounter: 90.7 kg (200 lb). Intake/Output Summary (Last 24 hours) at 3/19/2021 1036  Last data filed at 3/18/2021 1808  Gross per 24 hour   Intake    Output 675 ml   Net -675 ml       *Note that automatically entered I/Os may not be accurate; dependent on patient compliance with collection and accurate  by assistants. General:    Well nourished. Alert. Eyes:   Normal sclerae. Extraocular movements intact. ENT:  Normocephalic, atraumatic. Moist mucous membranes  CV:   Regular rate and rhythm. No murmur, rub, or gallop. Lungs:  Clear to auscultation bilaterally. No wheezing, rhonchi, or rales. Abdomen: Soft, nontender, nondistended. Extremities: Warm and dry. No cyanosis or edema. Neurologic: CN II-XII grossly intact. No gross focal deficits   Skin:     No rashes or jaundice. Psych:  Normal mood and affect.     Current Med List in Hospital:   Current Facility-Administered Medications   Medication Dose Route Frequency    morphine injection 2 mg  2 mg IntraVENous Q1H PRN    LORazepam (ATIVAN) injection 1 mg  1 mg IntraVENous Q1H PRN    glycopyrrolate (ROBINUL) injection 0.1 mg  0.1 mg IntraVENous TID PRN    sodium chloride (NS) flush 5-40 mL  5-40 mL IntraVENous PRN    acetaminophen (TYLENOL) tablet 650 mg  650 mg Oral Q6H PRN    Or    acetaminophen (TYLENOL) suppository 650 mg  650 mg Rectal Q6H PRN    polyethylene glycol (MIRALAX) packet 17 g  17 g Oral DAILY PRN    ondansetron (ZOFRAN) injection 4 mg  4 mg IntraVENous Q6H PRN       Discharge Info:   Current Discharge Medication List      START taking these medications    Details   glycopyrrolate (ROBINUL) 0.2 mg/mL injection 0.5 mL by IntraVENous route three (3) times daily as needed for Other (secrections). Qty: 1 mL, Refills: 0      LORazepam (ATIVAN) 2 mg/mL injection 0.5 mL by IntraVENous route every one (1) hour as needed for Other (anxiety). Max Daily Amount: 24 mg. Qty: 1 Vial, Refills: 0    Associated Diagnoses: Non-small cell cancer of right lung (HCC)      morphine 2 mg/mL injection 1 mL by IntraVENous route every one (1) hour as needed for Other (Resp distress and pain) for up to 3 days. Max Daily Amount: 48 mg. Qty: 5 mL, Refills: 0    Associated Diagnoses: Non-small cell cancer of right lung (HCC)      ondansetron (ZOFRAN) 4 mg/2 mL soln 2 mL by IntraVENous route every six (6) hours as needed for Nausea. Qty: 10 mL, Refills: 0         CONTINUE these medications which have NOT CHANGED    Details   furosemide (LASIX) 20 mg tablet Take 1 Tab by mouth daily. Indications: accumulation of fluid resulting from chronic heart failure  Qty: 90 Tab, Refills: 1      Thyroid, Pork, (ARMOUR THYROID) 120 mg Tab Take 150 mg by mouth daily.  Takes 1 (120 mg) + 1 (30 mg) tab daily         STOP taking these medications       dexAMETHasone (DECADRON) 4 mg tablet Comments:   Reason for Stopping:         folic acid (FOLVITE) 1 mg tablet Comments:   Reason for Stopping:         prochlorperazine (Compazine) 10 mg tablet Comments:   Reason for Stopping:         ondansetron (ZOFRAN ODT) 8 mg disintegrating tablet Comments:   Reason for Stopping:         amLODIPine (NORVASC) 5 mg tablet Comments:   Reason for Stopping:         cyanocobalamin (VITAMIN B-12) 1,000 mcg sublingual tablet Comments:   Reason for Stopping:         Cholecalciferol, Vitamin D3, (VITAMIN D3) 1,000 unit cap Comments:   Reason for Stopping:               Verified with discharge planner as patient is going to hospice house so he does not need prescriptions.     Signed:  Joseph Novak MD

## 2021-03-20 NOTE — PROGRESS NOTES
0591 Report received from [facility], RN. Pt was admitted 3/19 GIP status with hospice dx of stage 4 lung cancer with mets and symptom management of pain and dyspnea. Pt is currently resting with his eyes closed, no signs or symptoms of pain or distress noted. Pt is on room air. Bed is low and locked, call bell within reach, tab alert in place, side rails up x2. Lakhani catheter to gravity drainage. Door remains open for continued monitoring. 0830 Shift assessment completed. Pt is resting with his eyes open, pt was able to answer 'good morning' in a whisper voice. Pt is able to shake his head yes and no appropriately. Pt denied pain by shaking his head 'no' and aswered 'yes' by shaking his head yes when asked of he knew where he was. Mouth care completed, pt tolerated well. Schedule dose of Decadron 4mg IVP given and site flushed with 10cc NS. Skin tear on left arm was cleaned and wrapped with non stick dressing and gauze. 1000 Pt is resting with his eyes closed when staff entered the room. Pt opened his eyes when his name was called. Pt was repositioned to his left side for comfort. Sound machine set to waves as pt shook his head 'yes' when asked he would like it turned on. Safety measures in place. Door remains open for continued monitoring. 1300 Pt is resting with his eyes closed, FLACC 0/10. Safety measures in place. Door remains open for continued monitoring. 1556 Pt medicated with Morphine 4mg IVP after pt shook his head 'yes' when asked if he had any pain. Wife and friend at the bedside. Safety measures in place. 1630 Pt resting with his eyes closed. No signs or symptoms of pain or distress noted. No visitors at the bedside. Safety measures in place. Door remains open for continued monitoring. 1719 Pt is resting with his eyes closed, FLACC 0/10. RR even and unlabored. Safety measures in place. Door remains open for continued monitoring.      1805 Pt resting with his eyes closed, easily awakened when name was called. Pt shakes his head 'no' when asked if he has pain and 'yes' when asked if he wants his TV turned on. Pt was reoriented as he looked concerned and then shook his head 'yes' when reminded that his wife went home. Safety measures in place. Report given to Giselle Becker RN.

## 2021-03-20 NOTE — PROGRESS NOTES
1900 Walking rounds completed with Deangelo Lundberg RN. Pt identified by name and . Pt is under GIP care with a hospice diagnosis of stage 4 metastatic lung cancer. Pt being managed for pain and dyspnea. Pt is not interactive and unresponsive. Pt will sometimes open eyes but non verbal. Complete care. Pt has condom catheter in place draining yellow urine. Pt resting quietly with eyes closed; no signs of pain or distress noted. RR non labored on room air. No NVD or SOB. Bed in lowest and locked position with siderails x2; call light with reach and tab alert in place. FLACC 0/10.     Scheduled medication given per orders. Pt resting quietly with eyes closed; no signs of pain or distress noted. RR non labored on room air. No NVD or SOB. FLACC 0/10.    2202 Pt premedicated prior to bath. PRN Morphine given to manage pain. RR non labored on room air. No NVD or SOB. FLACC 0/10.    0044 Pt resting quietly with eyes closed; no signs of pain or distress noted. RR non labored on room air. No NVD or SOB. FLACC 0/10.    9184 Pt resting comfortably with eyes closed; no signs of pain or distress noted. RR non labored on room air. No NVD or SOB. FLACC 0/10.    0449 Pt resting calmly with eyes closed; no signs of pain or distress noted. RR non labored on room air. No NVD or SOB. FLACC 0/10.    0622 Pt resting quietly with eyes closed; no signs of pain or distress noted. RR non labored on room air. No NVD or SOB. FLACC 0/10. Walking rounds completed with Deangelo Lundberg RN.

## 2021-03-20 NOTE — PROGRESS NOTES
Problem: Falls - Risk of  Goal: *Absence of Falls  Description: Document Kimomiriam Ibrahim Fall Risk and appropriate interventions in the flowsheet. Outcome: Progressing Towards Goal  Note: Fall Risk Interventions:       Mentation Interventions: Adequate sleep, hydration, pain control, Bed/chair exit alarm, Door open when patient unattended    Medication Interventions: Assess postural VS orthostatic hypotension, Patient to call before getting OOB    Elimination Interventions: Bed/chair exit alarm, Call light in reach              Problem: Pressure Injury - Risk of  Goal: *Prevention of pressure injury  Description: Document Antelmo Scale and appropriate interventions in the flowsheet.   Outcome: Progressing Towards Goal  Note: Pressure Injury Interventions:  Sensory Interventions: Float heels, Keep linens dry and wrinkle-free, Minimize linen layers, Pressure redistribution bed/mattress (bed type)    Moisture Interventions: Absorbent underpads, Maintain skin hydration (lotion/cream), Minimize layers    Activity Interventions: Assess need for specialty bed, Pressure redistribution bed/mattress(bed type)    Mobility Interventions: Assess need for specialty bed, Float heels, HOB 30 degrees or less, Pressure redistribution bed/mattress (bed type)    Nutrition Interventions: Document food/fluid/supplement intake    Friction and Shear Interventions: HOB 30 degrees or less, Minimize layers

## 2021-03-20 NOTE — H&P
History and Physical    Patient: Nichole Kincaid MRN: [id number]  SSN: [id number]    YOB: 1940  Age: 68 y.o. Sex: male      Subjective:      Nichole Kincaid is a 68 y.o. male who has a PMH of including but not limited to stroke, HLD, HTN, hx of stage 4 lung cancer, thyroid disease, who is seen in consultation at the request of Dr. Marsha Serrato  for GIB. Pt recently admitted for suspected CVA. He was discharged 3/9/2021. EMS called to rehab facility 3/12 for respiratory distress. He was intubated and brought to ED. Labs with elevated WBC, KEI, hypernatremia. CXR with hazy infiltrate left base. He was started on antibiotics. He was admitted to ICU and started on sedation. He has been made DNR. He was extubated 3/14/21. Currently NPO. Remains on Zosyn. HGB 9.4 (down from 12.1 at discharge 3/9/2021 and 10.9 at admission 3/12/2021) normocytic. PLT 80.  BUN 52, Cr 1.19. Underwent swallowing study today. No overt bleeding noted. He is receiving pantoprazole BID. Heparin has been held. Pinky Decent patient is a swallowing study was positive and he continues aspirating.  The family desires no feeding tube.  It is not felt that further invasive diagnostic or therapeutic interventions would be of benefit.  The patient is a DNR. Past Medical History:   Diagnosis Date    Acute ischemic stroke ([facility] [address] 3/4/2021    Hypercholesterolemia     Hypertension     controlled with med    Morbid obesity ([facility] [address] 11/11/15    BMI- 36.1    Pleural effusion 12/14/2020    Primary malignant neoplasm of lung metastatic to other site [facility]) 12/28/2020    Thyroid disease     partial thyroid     Past Surgical History:   Procedure Laterality Date    HX COLONOSCOPY      HX HEENT      part of thyroid removed    HX HERNIA REPAIR  7872    umbilical    HX ORTHOPAEDIC Left     bunion removal on left great toe.     HX OTHER SURGICAL      thyroid removed (1)    IR INSERT TUNL CVC W PORT OVER 5 YEARS  1/7/2021      Family History Problem Relation Age of Onset    Heart Disease Brother 48        MI    Alzheimer Mother     Heart Disease Brother     Pacemaker Brother     Diabetes Sister     Alzheimer Sister     Alcohol abuse Brother     Alzheimer Brother     Diabetes Brother     Alcohol abuse Brother     Alzheimer Brother      Social History     Tobacco Use    Smoking status: Former Smoker     Packs/day: 1.50     Years: 30.00     Pack years: 45.00     Quit date: 1986     Years since quittin.2    Smokeless tobacco: Former User    Tobacco comment: quit    Substance Use Topics    Alcohol use: Yes     Alcohol/week: 1.0 standard drinks     Types: 1 Glasses of wine per week     Comment: daily      Prior to Admission medications    Medication Sig Start Date End Date Taking? Authorizing Provider   glycopyrrolate (ROBINUL) 0.2 mg/mL injection 0.5 mL by IntraVENous route three (3) times daily as needed for Other (secrections). 3/19/21  Yes Salo Barajas MD   LORazepam (ATIVAN) 2 mg/mL injection 0.5 mL by IntraVENous route every one (1) hour as needed for Other (anxiety). Max Daily Amount: 24 mg. 3/19/21  Yes Salo Barajas MD   morphine 2 mg/mL injection 1 mL by IntraVENous route every one (1) hour as needed for Other (Resp distress and pain) for up to 3 days. Max Daily Amount: 48 mg. 3/19/21 3/22/21 Yes Salo Barajas MD   ondansetron (ZOFRAN) 4 mg/2 mL soln 2 mL by IntraVENous route every six (6) hours as needed for Nausea. 3/19/21  Yes Salo Barajas MD   furosemide (LASIX) 20 mg tablet Take 1 Tab by mouth daily. Indications: accumulation of fluid resulting from chronic heart failure 20  Yes Indigo Castro MD   Thyroid, Pork, (ARMOUR THYROID) 120 mg Tab Take 150 mg by mouth daily. Takes 1 (120 mg) + 1 (30 mg) tab daily   Yes Provider, Historical        No Known Allergies    Review of Systems: The remainder of the admission historical database is as outlined in the Clinical Record.     Objective:     Vitals:    21 1340 03/20/21 0511   BP: (!) 165/91 (!) 149/88   Pulse: 88 92   Resp: 17 18   Temp: (!) 95.6 °F (35.3 °C) (!) 95.5 °F (35.3 °C)        Physical Exam:    Vital signs as outlined with mild pressure elevation. Skin examination is clear. Head and neck examination reveals no adenopathy jaundice or venous distention. Cardiopulmonary lamination reveals rales on the left side and dullness on the right side with no wheezes or respiratory distress. Abdominal examination reveals no palpable masses or tenderness. Extremities reveal venous stasis changes and some edema but no calf tenderness or ischemic changes. Neurologic examination reveals patient to be unresponsive with no lateralizing abnormalities.     Assessment:     Hospital Problems  Date Reviewed: 3/5/2021          Codes Class Noted POA    * (Principal) Primary malignant neoplasm of lung metastatic to other site [facility]) ICD-10-CM: C34.90  ICD-9-CM: 162.9  12/28/2020 Yes        Acute hypoxemic respiratory failure [facility]) ICD-10-CM: J96.01  ICD-9-CM: 518.81  3/12/2021 Yes        Metastatic lung cancer (metastasis from lung to other site) [facility]) ICD-10-CM: C34.90  ICD-9-CM: 162.9  3/19/2021 Unknown              Plan:     Current Facility-Administered Medications   Medication Dose Route Frequency    sodium chloride (NS) flush 3 mL  3 mL IntraVENous PRN    morphine (ROXANOL) concentrated oral syringe 10 mg  10 mg Oral Q30MIN PRN    Or    morphine (ROXANOL) concentrated oral syringe 10 mg  10 mg SubLINGual Q30MIN PRN    morphine injection 4 mg  4 mg SubCUTAneous Q20MIN PRN    Or    morphine injection 4 mg  4 mg IntraVENous Q20MIN PRN    acetaminophen (TYLENOL) tablet 650 mg  650 mg Oral Q4H PRN    LORazepam (ATIVAN) injection 1 mg  1 mg IntraVENous Q2H PRN    loperamide (IMODIUM) capsule 4 mg  4 mg Oral PRN    senna (SENOKOT) tablet 8.6 mg  1 Tab Oral BID    haloperidol lactate (HALDOL) injection 2 mg  2 mg SubCUTAneous Q1H PRN    Or    haloperidol lactate (HALDOL) injection 2 mg  2 mg IntraVENous Q1H PRN    hyoscyamine SL (LEVSIN/SL) tablet 0.125 mg  0.125 mg SubLINGual Q4H PRN    glycopyrrolate (ROBINUL) injection 0.2 mg  0.2 mg SubCUTAneous Q4H PRN    LORazepam (ATIVAN) injection 2 mg  2 mg IntraVENous Q10MIN PRN    dexamethasone (DECADRON) 4 mg/mL injection 4 mg  4 mg IntraVENous Q12H    sodium chloride (NS) flush 3 mL  3 mL IntraVENous Q12H       Patient is not accompanied by family at the present time. He appears to be comfortable. I would anticipate his death within the next 10 days or sooner.       Signed By: Pastor Warren MD     March 20, 2021

## 2021-03-20 NOTE — PROGRESS NOTES
1850-Report received from Marcial Hall RN. Patient identified by name and . Patient resting quietly in bed with eyes closed. No signs or symptoms of distress, pain, agitation/anxiety, or SOB noted at present. Lakhani catheter in place draining rodrigo colored urine. Bed locked and in lowest position, side rails up x 2, call bell within reach, tab alarm in place. No family present at bedside. Door open for continuous monitoring. 1920-Patient admitted to [facility] on 3/19/2021 with a terminal diagnosis of malignant neoplasm of lung metastatic to other site. Patient is GIP level of care for symptom management of pain, dyspnea, and agitation. Patient was recently admitted to [facility] due to suspected CVA. Patient was discharged to rehab and was called on 3/12/2021 due to respiratory distress. Patient required intubation. Patient underwent swallow study during stay and was found that patient had been aspirating. Family elected to have no feeding tube. Patient able to answer questions appropriately with nodding head. Patient requiring skilled nursing assessment, ongoing monitoring, and reevaluation of medication regimen to achieve optimum level of comfort. FLACC 0.    2011-Nurse entered patient's room at this time. Patient resting quietly in bed with eyes closed but easily awaken by nurse voiced. Patient able to verbalize \"no\" when nurse asked if he was in pain. Scheduled Decadron 4mg IV given at this time. No signs or symptoms of distress, pain, agitation, or discomfort noted. FLACC 0.    2230-Patient continues to rest in bed with eyes closed. Respirations unlabored with no signs or symptoms of distress, pain, agitation, or discomfort noted. FLACC 0.    0034-Patient resting in bed with eyes closed. Respirations unlabored with no signs or symptoms of distress, pain, agitation, or discomfort noted. FLACC 0.    0100-Nurse entered into patient's room after hearing patient cough. Nurse asked patient if he was ok.  Patient nodded head yes. Informed to let nurse know of any needs or pain. Patient voiced, \"ok. \" Remind patient of call light attached to gown on chest, if needed. Patient visualized and verbalized agreement. 0334-PRN Robinul 0.2mg given at this time for symptom management of secretions aeb audible secretions heard. Patient has very weak cough and unable to clear secretions. Patient continues to deny pain or discomfort. 0410-Patient continues to rest in bed with eyes closed. Respirations unlabored. No coughing or audible secretions heard. FLACC 0.    0602-Patient resting in bed with eyes closed but easily awaken. No signs or symptoms of distress, pain, agitation, or discomfort noted. Patient denies being in pain or discomfort. FLACC 0.     Report given to Pérez Negro RN

## 2021-03-21 NOTE — PROGRESS NOTES
1850-Report received from Marcial Hall RN. Patient identified by name and . Patient resting quietly in bed with eyes closed. No signs or symptoms of distress, pain, agitation/anxiety, or SOB noted at present. Lakhani catheter in place draining light rodrigo colored urine. Bed locked and in lowest position, side rails up x 2, call bell within reach, tab alarm in place. No family present at bedside. Door open for continuous monitoring. 1915-Patient admitted to [facility] on 3/19/2021 with a terminal diagnosis of malignant neoplasm of lung metastatic to other site. Patient is GIP level of care for symptom management of pain, dyspnea, and agitation. Patient was recently admitted to [facility] due to suspected CVA. Patient was discharged to rehab and was called on 3/12/2021 due to respiratory distress. Patient required intubation. Patient underwent swallow study during stay and was found that patient had been aspirating. Family elected to have no feeding tube. Patient able to answer questions appropriately with nodding head. Patient requiring skilled nursing assessment, ongoing monitoring, and reevaluation of medication regimen to achieve optimum level of comfort. FLACC 0.    2034-Patient alert at this time. Scheduled Decadron 4mg IV given. Patient denies pain. Patient decline any fluids at this time. No signs or symptoms of distress, pain, agitation, or discomfort noted. FLACC 0.    2225-Patient continues to rest in bed with eyes closed but easily awaken when nurse enters room. Patient answer ys and no questions appropriately. Patient denies pain. No signs or symptoms of distress, pain, agitation, or discomfort noted. 0030-Patient continues to rest in bed with eyes closed. Respirations unlabored with no signs or symptoms of distress, pain, agitation, or discomfort noted. Patient denies pain. FLACC 0. Patient repositioned and floated with pillows at this time. 0240-Patient continues to rest in bed with eyes closed. Respirations unlabored with no signs or symptoms of distress, pain, agitation, or discomfort noted. Patient denies pain. FLACC 0.     0416-Patient continues to rest in bed with eyes closed. Respirations unlabored with no signs or symptoms of distress, pain, agitation, or discomfort noted. Patient denies pain. FLACC 0.     0610-Patient continues to rest in bed with eyes closed. Respirations unlabored with no signs or symptoms of distress, pain, agitation, or discomfort noted. Patient denies pain. FLACC 0.      Report given to Orestes Mo RN

## 2021-03-21 NOTE — PROGRESS NOTES
6973 Report received from Mandy Bonilla RN. Pt was admitted 3/19 GIP status with hospice dx of stage 4 lung cancer with mets and symptom management of pain and dyspnea. Pt is currently resting with his eyes closed, no signs or symptoms of pain or distress noted. Pt is on room air. Bed is low and locked, call bell within reach, tab alert in place, side rails up x2. Rodgers catheter to gravity drainage. Door remains open for continued monitoring. 0834 Pt resting in bed with his eyes open, watching TV. Pt shakes his head yes and no appropriately. Mouth care completed. Scheduled Decadron given via left chest port. Safety measures in place. Door remains open for continued monitoring. 1045 Pt resting with his eyes closed, FLACC 0/10. Safety measures in place. Door remains open for continued monitoring. 1220 Pt resting with his eyes open. Pt denies pain at this time. When asked, pt states 'yes' to dinner tray. Tray was ordered for dinner. Safety measures in place. Door remains open for continued monitoring. 26 Pt is resting in bed with his eyes open, watching TV. Pt denies pain. Thickened water given with syringe and pt did well swallowing approximately 10ccs. Safety measures in place. Door remains open for continued monitoring. [address] Pt's wife is at the bedside. Door remains closed. 1645 Pt having a bath by CNA, pt's rodgers catheter was laying in the bed. New rodgers placed with immediate clear yellow urine returned. Pt tolerated well. Safety measures in place. Door remains open for continued monitoring. 1710 Pt's pleasure dinner tray at the bedside. Pt was able to take a few spoons of tomato soup, a few sips of thickened water with a straw and a few spoons of pudding. Pt had a tiny bit of coughing after his last spoon of pudding. Pt is answering simple yes and no questions with some delay of response. Safety measures in place. Door remains closed while wife is at the bedside.  Pt denies pain at this time. Report given to Negro Barron RN.

## 2021-03-21 NOTE — PROGRESS NOTES
Problem: Falls - Risk of  Goal: *Absence of Falls  Description: Document Steven Bennett Fall Risk and appropriate interventions in the flowsheet. Outcome: Progressing Towards Goal  Note: Fall Risk Interventions:       Mentation Interventions: Adequate sleep, hydration, pain control, Bed/chair exit alarm, Door open when patient unattended, Update white board    Medication Interventions: Bed/chair exit alarm    Elimination Interventions: Bed/chair exit alarm, Call light in reach              Problem: Pressure Injury - Risk of  Goal: *Prevention of pressure injury  Description: Document Antelmo Scale and appropriate interventions in the flowsheet.   Outcome: Progressing Towards Goal  Note: Pressure Injury Interventions:  Sensory Interventions: Assess changes in LOC, Avoid rigorous massage over bony prominences, Check visual cues for pain, Float heels, Keep linens dry and wrinkle-free, Monitor skin under medical devices, Pad between skin to skin, Pressure redistribution bed/mattress (bed type)    Moisture Interventions: Absorbent underpads, Apply protective barrier, creams and emollients, Internal/External urinary devices, Moisture barrier    Activity Interventions: Assess need for specialty bed, Pressure redistribution bed/mattress(bed type)    Mobility Interventions: Float heels, Pressure redistribution bed/mattress (bed type)    Nutrition Interventions: Document food/fluid/supplement intake    Friction and Shear Interventions: Apply protective barrier, creams and emollients, Lift team/patient mobility team

## 2021-03-22 NOTE — [facility] IDG NURSE NOTES
Patient: Reggie Gifford    Date: 03/22/21  Time: 2:55 PM    [facility] Nurse Notes  1st IDG: Pt is a 68-year-old male with Metastatic NSCLC who is here GIP level of care for management of dyspnea, troublesome secretions. Lakhani with UOP of 1050ml   IV access: Left Subclavian Venous Catheter   PO intake: 25%  Oxygen: 3/L  Wounds: Left Lower Arm ST  PRN medications: Robinul 0.2mg x 1 dose for secretions   Scheduled meds:  Decadron 4mg Q 12 / Senokot 8.6mg Q D  Plan: Add Duonebs breathing treatment TID. Comprehensive plan of care reviewed. IDG and pt./family in agreement with plan of care. The IDG identifies through on-going assessment when a change is needed to the POC; the pt/family will receive care and services necessitated by changes in POC. Medications reviewed by the pharmacist and Medical Director.         Signed by: Mark Wilkins RN

## 2021-03-22 NOTE — [facility] IDG CHAPLAIN NOTES
Patient: Torrey Marcos    Date: 03/22/21  Time: 12:32 PM    [facility]  Notes    / Grief Counselor has reviewed  Initial Comprehensive Assessment and plan of care. Bereavement and Spiritual Care Assessments to be completed and plan of care put in place to meet the needs, requests and referrals.       Signed by: Jolene Krishnan

## 2021-03-22 NOTE — PROGRESS NOTES
Problem: Falls - Risk of  Goal: *Absence of Falls  Description: Document Haley Blood Fall Risk and appropriate interventions in the flowsheet. Outcome: Progressing Towards Goal  Note: Fall Risk Interventions:       Mentation Interventions: Adequate sleep, hydration, pain control, Bed/chair exit alarm, Door open when patient unattended    Medication Interventions: Bed/chair exit alarm    Elimination Interventions: Bed/chair exit alarm, Call light in reach              Problem: Pressure Injury - Risk of  Goal: *Prevention of pressure injury  Description: Document Antelmo Scale and appropriate interventions in the flowsheet.   Outcome: Progressing Towards Goal  Note: Pressure Injury Interventions:  Sensory Interventions: Assess changes in LOC, Avoid rigorous massage over bony prominences, Check visual cues for pain, Float heels, Pad between skin to skin    Moisture Interventions: Absorbent underpads, Apply protective barrier, creams and emollients, Internal/External urinary devices, Moisture barrier    Activity Interventions: Assess need for specialty bed    Mobility Interventions: Float heels, HOB 30 degrees or less, Pressure redistribution bed/mattress (bed type)    Nutrition Interventions: Document food/fluid/supplement intake    Friction and Shear Interventions: Apply protective barrier, creams and emollients, Lift team/patient mobility team

## 2021-03-22 NOTE — [facility] IDG CHAPLAIN NOTES
Patient: Torrey Marcos    Date: 03/22/21  Time: 11:22 AM    [facility] [place]  Notes  / Grief Counselor has reviewed  Initial Comprehensive Assessment and plan of care. Bereavement and Spiritual Care Assessments to be completed and plan of care put in place to meet the needs, requests and referrals.         Signed by: Jolene Krishnan

## 2021-03-22 NOTE — PROGRESS NOTES
Demographics     Information provided by: wife Ruth Duarte      Name:                                                                  Level of Care  GIP [x] Routine  [] Respite   []           From the in home program Yes [] No [x]  Team                                                        Diagnosis: lung cancer        Insurance    Medicare [x]   Medicaid  []  [employer] Cross  []      Other []              Social    [x]   Single []     []    []    Spouse name: Justen Bras of marriage: 39 yrs  Children:  one son in [address] in the Home prior to admission Yes []  No [x]      Financial Concerns :                     Sheyla Application Needed   Yes [x]  No []     Medicaid application needed Yes []  No []                                   IFA Form Complete  Yes [x]   No []    Discharge Plans:   wife reports she cannot care for pt at home and cannot afford room and board at [facility] if pt is switched to routine. Financial jenifer given and nursing home placement discussed. Work History :  Retired [x] [employer] [] Part-time [] Disabled []        [address]   Yes []  No [x]  [employer] []   [employer] [] [employer] [] [facility] []  [employer] []  [employer] []  Linked to [place]   Yes []  No []  Referral made to [employer] Yes [] No [x]        Advanced Directives Scanned in the system     Living Will  Yes  []  No []   HCPOA     Yes  []  No []   DPOA        Yes  []  No []  DNR           Yes [x]  No []           Final Arrangements: the [facility] History and/or Narrative copied from the patients chart from the Admitting Physician or Rn:    Dawood Dickinson is a 68 y.o. male who has a PMH of including but not limited to stroke, HLD, HTN, hx of stage 4 lung cancer, thyroid disease, who is seen in consultation at the request of Dr. Óscar Collins  for GIB. Pt recently admitted for suspected CVA. He was discharged 3/9/2021.  EMS called to rehab facility 3/12 for respiratory distress. He was intubated and brought to ED. Labs with elevated WBC, KEI, hypernatremia. CXR with hazy infiltrate left base. He was started on antibiotics. He was admitted to ICU and started on sedation. He has been made DNR. He was extubated 3/14/21. Currently NPO. Remains on Zosyn. HGB 9.4 (down from 12.1 at discharge 3/9/2021 and 10.9 at admission 3/12/2021) normocytic. PLT 80. BUN 52, Cr 1.19. Underwent swallowing study today. No overt bleeding noted. He is receiving pantoprazole BID. Heparin has been held. The patient is a swallowing study was positive and he continues aspirating. The family desires no feeding tube. It is not felt that further invasive diagnostic or therapeutic interventions would be of benefit. The patient is a DNR.          Volunteer discussion: Yes []    No [x]      Goals of care for the patient and family: Wife reports her goal is for comfort only and no aggressive treatment        Coping and Bereavement:        wife coping appropriately                      Was a Referral made to Bereavement  Yes [] No [x]

## 2021-03-22 NOTE — PROGRESS NOTES
1000- Bedside report received, patient to resting quietly in bed watching T.V. Patient with s/sx pain or distress. Call light in reach, bed is low and locked, tab alert in place, and door left open for continuous monitoring. 1009-Pt refused po medication senna, was agreeable to taking IV decadron. Denies any pain at this time with continue to monitor. 1200- Pt watching T.V, Denies any pain at this time. Breathing even and unlabored. 1325-Pt wife and friend visiting at bed sided. Pt denies any pian or distress. 1450- Pt resting quietly with eyes closed breathing even and unlabored no s/sx of pain or distress noted. Will continue to monitor. 1700- Pt watching T.V, Denies any pain, no s/sx of pain or distress noted, breathing even and unlabored. Will continue to monitor.     Report given to Daren Dumont Rn

## 2021-03-22 NOTE — [facility] IDG CHAPLAIN NOTES
Patient: Treasure Barbosa    Date: 03/22/21  Time: 11:22 AM    [facility] [place]  Notes    Assessments pending for spiritual and bereavement care.         Signed by: Linh Gonzalez

## 2021-03-22 NOTE — [facility] IDG SOCIAL WORKER NOTES
THOMAS has read the initial comprehensive assessment and plan of care and acknowledges no urgent needs and is in agreement with plan    THOMAS to assess coping and needs each visit and offer availability.

## 2021-03-22 NOTE — [facility] IDG MASTER NOTE
[facility]  Date: 03/22/21  Time: 2:55 PM    ___________________    Patient: Alexandra Homans  Coverage Information:     Payor: [place] [employer]     Plan: [place] MEDICARE PART A AND B     Subscriber ID: [id number]     Phone Number:   MRN: [id number]  Current Benefit Period: Benefit Period 1  Start Date: 3/19/2021  End Date: 6/16/2021      Hospice Attending Provider: Kemar Ziegler [address] [place] [address]  [address]  Phone: [phone]  Fax: [phone]    Level of Care: General Inpatient Care      ___________________    Diagnoses: There were no encounter diagnoses.     Current Medications:    Current Facility-Administered Medications:     albuterol-ipratropium (DUO-NEB) 2.5 MG-0.5 MG/3 ML, 3 mL, Nebulization, Q8H, Kimi Wiggins NP, 3 mL at 03/22/21 1352    albuterol-ipratropium (DUO-NEB) 2.5 MG-0.5 MG/3 ML, 3 mL, Nebulization, Q4H PRN, Rocio Walsh NP    dexamethasone (DECADRON) 4 mg/mL injection 4 mg, 4 mg, IntraVENous, BID WITH MEALS, Kimi Figueroa NP    polyethylene glycol (MIRALAX) packet 17 g, 17 g, Oral, DAILY PRN, Rocio Walsh NP    sodium chloride (NS) flush 3 mL, 3 mL, IntraVENous, PRN, Judd Ni MD, 3 mL at 03/21/21 0334    morphine (ROXANOL) concentrated oral syringe 10 mg, 10 mg, Oral, Q30MIN PRN **OR** morphine (ROXANOL) concentrated oral syringe 10 mg, 10 mg, SubLINGual, Q30MIN PRN, Judd Ni MD    morphine injection 4 mg, 4 mg, SubCUTAneous, Q20MIN PRN **OR** morphine injection 4 mg, 4 mg, IntraVENous, Q20MIN PRN, Judd Ni MD, 4 mg at 03/20/21 1356    acetaminophen (TYLENOL) tablet 650 mg, 650 mg, Oral, Q4H PRN, Judd Ni MD    LORazepam (ATIVAN) injection 1 mg, 1 mg, IntraVENous, Q2H PRN, Judd Ni MD    loperamide (IMODIUM) capsule 4 mg, 4 mg, Oral, PRN, Judd Ni MD    haloperidol lactate (HALDOL) injection 2 mg, 2 mg, SubCUTAneous, Q1H PRN **OR** haloperidol lactate (HALDOL) injection 2 mg, 2 mg, IntraVENous, Q1H PRN, Elver Motley MD    hyoscyamine SL (LEVSIN/SL) tablet 0.125 mg, 0.125 mg, SubLINGual, Q4H PRN, Elver Motley MD    glycopyrrolate (ROBINUL) injection 0.2 mg, 0.2 mg, SubCUTAneous, Q4H PRN, Elver Motley MD, 0.2 mg at 03/21/21 0334    LORazepam (ATIVAN) injection 2 mg, 2 mg, IntraVENous, Q10MIN PRN, Elver Motley MD    sodium chloride (NS) flush 3 mL, 3 mL, IntraVENous, Q12H, Wilhemenia Law, NP, 3 mL at 03/22/21 1011    Orders:  Orders Placed This Encounter    IP CONSULT TO SPIRITUAL CARE     Standing Status:   Standing     Number of Occurrences:   1     Order Specific Question:   Reason for Consult: Answer: Once on week one, then PRN. For [facility] Only. For contracted patients, their primary hospice will continue to manage spiritual care needs.  DIET PLEASURE     Standing Status:   Standing     Number of Occurrences:   1     Order Specific Question:   Likes/Dislikes/Preferences     Answer:   thicken liquids    VITAL SIGNS     Standing Status:   Standing     Number of Occurrences:   47585    NURSING-MISCELLANEOUS: Comfort Care Measures CONTINUOUS     Standing Status:   Standing     Number of Occurrences:   1     Order Specific Question:   Description of Order:     Answer:   Comfort Care Measures    BLADDER CHECKS     May scan bladder PRN for urinary retention and or patient discomfort     Standing Status:   Standing     Number of Occurrences:   82510    PAIN ASSESSMENT Pain and Symptoms: Assess ever 4 hours and PRN, for GIP level of care. PRN Routine     Standing Status:   Standing     Number of Occurrences:   50726     Order Specific Question:   Please describe the test or procedure you would like to order. Answer:   Pain and Symptoms: Assess ever 4 hours and PRN, for GIP level of care.     BEDREST, COMPLETE     Standing Status:   Standing     Number of Occurrences:   1    VITAL SIGNS PER UNIT ROUTINE     Standing Status:   Standing Number of Occurrences:   1    BRADLEY CATHETER, CARE     1. Bradley Catheter care every shift and PRN  2. Notify Physician of Bradley Catheter leakage, occlusion, gross adherent sediment or accidental removal  3. Change Bradley 30 days after insertion. 4. May flush catheter bid and prn leakage or gross adherent sediment or mucus. Standing Status:   Standing     Number of Occurrences:   1    NURSING ASSESSMENT:  SPECIFY Assess for GIP, routine or respite level of care Q SHIFT Routine     Standing Status:   Standing     Number of Occurrences:   1     Order Specific Question:   Please describe the test or procedure you would like to order. Answer:   Assess for GIP, routine or respite level of care    NURSING-MISCELLANEOUS: admit 3/19: ([facility]) Admitted GIP with metastatic lung cancer for management of pain, dyspnea and agitation. DIAGNOSIS: Metastatic Lung Cancer (acute hypoxic respiratory failure, multiple intrathoracic metastases, malignant int. .. 3/19: ([facility]) Admitted GIP with metastatic lung cancer for management of pain, dyspnea and agitation. DIAGNOSIS: Metastatic Lung Cancer (acute hypoxic respiratory failure, multiple intrathoracic metastases, malignant intrathoracic lymphadenopathy, spinal metastases, aspiration pneumonia, chronic ethanol abuse)      SUMMARY: Patient is a 68 y.o. male with PMH of including but not limited to stroke, HLD, HTN, hx of stage 4 lung cancer, thyroid disease, who is seen in consultation at the request of Dr. Shonda Bentley  for GIB. Pt recently admitted for suspected CVA. He was discharged 3/9/2021. EMS called to rehab facility 3/12 for respiratory distress. He was intubated and brought to ED. Labs with elevated WBC, KEI, hypernatremia. CXR with hazy infiltrate left base. He was started on antibiotics. He was admitted to ICU and started on sedation. He has been made DNR. He was extubated 3/14/21. Currently NPO. Remains on Zosyn.  HGB 9.4 (down from 12.1 at discharge 3/9/2021 and 10.9 at admission 3/12/2021) normocytic. PLT 80. BUN 52, Cr 1.19. Underwent swallowing study today. No overt bleeding noted. He is receiving pantoprazole BID. Heparin has been held. The patient is a swallowing study was positive and he continues aspirating. The family desires no feeding tube. It is not felt that further invasive diagnostic or therapeutic interventions would be of benefit. The patient is a DNR. CERTIFICATION: The patient is appropriate for hospice level care. Death is likely in the next 6 weeks. Standing Status:   Standing     Number of Occurrences:   1     Order Specific Question:   Description of Order:     Answer:   admit    DO NOT RESUSCITATE     Standing Status:   Standing     Number of Occurrences:   1     Order Specific Question:   Comfort Measures Only? Answer: Yes    OXYGEN CANNULA Liters per minute: 3; Indications for O2 therapy: HYPOXIA PRN Routine     Standing Status:   Standing     Number of Occurrences:   83800     Order Specific Question:   Liters per minute:      Answer:   3     Order Specific Question:   Indications for O2 therapy     Answer:   HYPOXIA    sodium chloride (NS) flush 3 mL    OR Linked Order Group     morphine (ROXANOL) concentrated oral syringe 10 mg     morphine (ROXANOL) concentrated oral syringe 10 mg    OR Linked Order Group     morphine injection 4 mg     morphine injection 4 mg    acetaminophen (TYLENOL) tablet 650 mg    LORazepam (ATIVAN) injection 1 mg    loperamide (IMODIUM) capsule 4 mg    DISCONTD: senna (SENOKOT) tablet 8.6 mg    OR Linked Order Group     haloperidol lactate (HALDOL) injection 2 mg     haloperidol lactate (HALDOL) injection 2 mg    hyoscyamine SL (LEVSIN/SL) tablet 0.125 mg    glycopyrrolate (ROBINUL) injection 0.2 mg    LORazepam (ATIVAN) injection 2 mg    DISCONTD: dexamethasone (DECADRON) 4 mg/mL injection 4 mg    sodium chloride (NS) flush 3 mL    albuterol-ipratropium (DUO-NEB) 2.5 MG-0.5 MG/3 ML     Order Specific Question:   MODE OF DELIVERY     Answer:   Nebulizer    albuterol-ipratropium (DUO-NEB) 2.5 MG-0.5 MG/3 ML     Order Specific Question:   MODE OF DELIVERY     Answer:   Nebulizer    dexamethasone (DECADRON) 4 mg/mL injection 4 mg    polyethylene glycol (MIRALAX) packet 17 g    INITIAL PHYSICIAN ORDER: HOSPICE Level Of Care: General Inpatient; Reason for Admission: Metastatic LUNG Cancer, pulmonary aspiration for respiratory and pain management. Standing Status:   Standing     Number of Occurrences:   1     Order Specific Question:   Status     Answer:   Hospice     Order Specific Question:   Level Of Care     Answer:   General Inpatient     Order Specific Question:   Reason for Admission     Answer:   Metastatic LUNG Cancer, pulmonary aspiration for respiratory and pain management. Order Specific Question:   Inpatient Hospitalization Certified Necessary for the Following Reasons     Answer:   3. Patient receiving treatment that can only be provided in an inpatient setting (further clarification in H&P documentation)     Order Specific Question:   Admitting Diagnosis     Answer:   Metastatic lung cancer (metastasis from lung to other site) [facility]) [[id number]]     Order Specific Question:   Terminal Prognosis Diagnosis(es)     Answer:   Metastatic lung cancer (metastasis from lung to other site) [facility]) [[id number]]     Order Specific Question:   Admitting Physician     Answer:   Efrain Tuttle     Order Specific Question:   Attending Physician     Answer:   Madi Flores [[id number]]    IP CONSULT TO SOCIAL WORK     Standing Status:   Standing     Number of Occurrences:   1     Order Specific Question:   Reason for Consult: Answer: For [facility] Only. For contracted patients, their primary hospice will continue to manage social work needs.        Allergies:  No Known Allergies    Care Plan:  Multidisciplinary Problems (Active)     Problem: Falls - Risk of     Dates: Start: 03/19/21       Disciplines: Interdisciplinary    Goal: *Absence of Falls     Dates: Start: 03/19/21   Expected End: 03/27/21       Description: Document Bard  Fall Risk and appropriate interventions in the flowsheet. Disciplines: Interdisciplinary                Problem: Patient Education: Go to Patient Education Activity     Dates: Start: 03/19/21       Disciplines: Interdisciplinary    Goal: Patient/Family Education     Dates: Start: 03/19/21       Disciplines: Interdisciplinary                Problem: Patient Education: Go to Patient Education Activity     Dates: Start: 03/19/21       Disciplines: Interdisciplinary    Goal: Patient/Family Education     Dates: Start: 03/19/21       Disciplines: Interdisciplinary                Problem: Pressure Injury - Risk of     Dates: Start: 03/19/21       Disciplines: Interdisciplinary    Goal: *Prevention of pressure injury     Dates: Start: 03/19/21   Expected End: 03/27/21       Description: Document Antelmo Scale and appropriate interventions in the flowsheet.     Disciplines: Interdisciplinary                  Care Plan Problems/Goals      Progressing Towards Goal (2)      *Absence of Falls (Falls - Risk of)    Disciplines:  Interdisciplinary Expected end:  03/27/21        Outcome: Progressing Towards Goal By Latricia Armstrong RN on 03/22/21 6091            *Prevention of pressure injury (Pressure Injury - Risk of)    Disciplines:  Interdisciplinary Expected end:  03/27/21        Outcome: Progressing Towards Goal By Latricia Armstrong RN on 03/22/21 7109                         No Outcome (2)      Patient/Family Education (Patient Education: Go to Patient Education Activity)    Disciplines:  Interdisciplinary Expected end:  -          Patient/Family Education (Patient Education: Go to Patient Education Activity)    Disciplines:  Interdisciplinary Expected end:  -                            ___________________    Care Team Notes          POC/IDG Notes      [facility] IDG Nurse Notes by Lenin Buchanan RN at 03/22/21 1455  Version 1 of 1    Author: Lenin Buchanan RN Service: NURSING Author Type: Registered Nurse    Filed: 03/22/21 1455 Date of Service: 03/22/21 1455 Status: Signed    : Lenin Buchanan RN (Registered Nurse)       Patient: Gilda Goodman    Date: 03/22/21  Time: 2:55 PM    hospitals Nurse Notes  1st IDG: Pt is a 53-year-old male with Metastatic NSCLC who is here GIP level of care for management of dyspnea, troublesome secretions. Alkhani with UOP of 1050ml   IV access: Left Subclavian Venous Catheter   PO intake: 25%  Oxygen: 3/L  Wounds: Left Lower Arm ST  PRN medications: Robinul 0.2mg x 1 dose for secretions   Scheduled meds:  Decadron 4mg Q 12 / Senokot 8.6mg Q D  Plan: Add Duonebs breathing treatment TID. Comprehensive plan of care reviewed. IDG and pt./family in agreement with plan of care. The IDG identifies through on-going assessment when a change is needed to the POC; the pt/family will receive care and services necessitated by changes in POC. Medications reviewed by the pharmacist and Medical Director. Signed by: Victorino Warren RN       [address] [facility]  Notes by Sandra Jackson at 03/22/21 1232  Version 1 of 1    Author: Sandra Jackson Service: Spiritual Care Author Type: Pastoral Care    Filed: 03/22/21 1233 Date of Service: 03/22/21 1232 Status: Signed    : Sandra Jackson (Pastoral Care)       Patient: Gilda Goodman    Date: 03/22/21  Time: 12:32 PM    hospitals  Notes    / Grief Counselor has reviewed  Initial Comprehensive Assessment and plan of care. Bereavement and Spiritual Care Assessments to be completed and plan of care put in place to meet the needs, requests and referrals.       Signed by: Elizabeth Saini       hospitals IDG  Notes by Sandra Jackson at 03/22/21 1122  Version 1 of 1    Author: Sandra Jackson Service: Spiritual Care Author Type: Pastoral Care    Filed: 03/22/21 1123 Date of Service: 03/22/21 1122 Status: Signed    : Steven Amezquita (Pastoral Care)       Patient: Alexandra Homans    Date: 03/22/21  Time: 11:22 AM    hospitals  Notes    Assessments pending for spiritual and bereavement care. [facility]  Notes by Steven Amezquita at 03/22/21 1122  Version 1 of 1    Author: Steven Amezquita Service: Spiritual Care Author Type: Pastoral Care    Filed: 03/22/21 1122 Date of Service: 03/22/21 1122 Status: Signed    : Steven Amezquita (Pastoral Care)       Patient: Alexandra Homans    Date: 03/22/21  Time: 11:22 AM    hospitals  Notes  / Grief Counselor has reviewed  Initial Comprehensive Assessment and plan of care. Bereavement and Spiritual Care Assessments to be completed and plan of care put in place to meet the needs, requests and referrals. Signed by: [address]  Notes by Musa Holt LMSW at 03/22/21 1103  Version 1 of 1    Author: Musa Holt LMSW Service: Licensed Clinical  Author Type:     Filed: 03/22/21 1104 Date of Service: 03/22/21 1103 Status: Signed    : Musa Holt LMSW ()       SW has read the initial comprehensive assessment and plan of care and acknowledges no urgent needs and is in agreement with plan    SW to assess coping and needs each visit and offer availability.                 Care Team Present:

## 2021-03-22 NOTE — PROGRESS NOTES
Background: Pat Dennis is a 80year old gentleman who comes to us  After hospitalization and rehab and hospitalization again. 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 He is supported by his wife Jairon Kenny and [facility]. He and Jairon Kenny  when Shannon Cuevas was 14years old. That was about 35 years ago. Nash does not live in the area, however he is int ouch with them every day. Patient is a Jehovah's witness and member of [employer]. [address] [facility] [address], [place], [address]. Phone: [phone]. Rev. Oralia Woodson is the . His e mail address is [email or web address].    Assessment:  Patient's wife and best friend were in the room when  arrived. They were accepting of spiritual support. Patient was lying in bed with eyes partially open. Vocationally he would  Attempt to clear what sounded like congestion in his throat.  was invited to sit with wife and her friend. They were very easy to talk with. Ms. Yuli Blas shared how she felt about her 's condition and impending death. At this point she sounds as if she is accepting.  explored the things she and her  have enjoyed doing. Ms. Yuli Blas had difficulty remembering but finally said they watch [address]. On several occasions she said it was hard for her to remember things right now.  attempted to put her at ease. She talked about how much the pandemic had changed their ability to go to [employer]. Her friend chimed in and shared her Carleen Nyhan and how she missed jerica at [employer] during the pandemic. She also said that Mrs. Yuli Blas had a lot of health issues. At that time Mrs. Yuli Blas said the same thing. She tried to described some of her health struggles.  affirmed the difficulty of being away from [employer]/ friends and even our doctors during the  Pandemic. Eventually the conversation turned to patient's Ladonna and health.    was Able to move to the bedside and talk with him. He would nod yes or no.  offered prayer with him.  assured he, his wife and her friend of continued support. Plan:  to focus on anticipatory grief and provide spiritual and emotional support.

## 2021-03-22 NOTE — PROGRESS NOTES
0700- Report received, patient resting quietly in bed with no s/sx pain or distress. Call light within reach. Bed is low and locked, tab alert in place, and door left open for continuous monitoring. 5501- patient awake watching tv. No complaints at this time. Denies pain or breathing difficulty. 1007- Patient resting with eyes closed. No s/sx of pain or distress. Reported off to Orestes Aden, [facility], [facility]. Will be under this writer's supervision for remainder of shift.

## 2021-03-22 NOTE — PROGRESS NOTES
Problem: Falls - Risk of  Goal: *Absence of Falls  Description: patient will not have any falls or injuries during shift. Outcome: Progressing Towards Goal  Note: Fall Risk Interventions:       Mentation Interventions: Adequate sleep, hydration, pain control, Bed/chair exit alarm    Medication Interventions: Bed/chair exit alarm, Patient to call before getting OOB    Elimination Interventions: Bed/chair exit alarm, Toilet paper/wipes in reach, Toileting schedule/hourly rounds              Problem: Pressure Injury - Risk of  Goal: *Prevention of pressure injury  Description: Document Antelmo Scale and appropriate interventions in the flowsheet.   Outcome: Progressing Towards Goal  Note: Pressure Injury Interventions:  Sensory Interventions: Assess changes in LOC, Assess need for specialty bed, Float heels, Minimize linen layers, Pad between skin to skin    Moisture Interventions: Absorbent underpads, Apply protective barrier, creams and emollients, Limit adult briefs, Minimize layers    Activity Interventions: Assess need for specialty bed, Pressure redistribution bed/mattress(bed type)    Mobility Interventions: Assess need for specialty bed, Float heels, Suspension boots    Nutrition Interventions: Document food/fluid/supplement intake, Discuss nutritional consult with provider, Offer support with meals,snacks and hydration    Friction and Shear Interventions: Apply protective barrier, creams and emollients, Minimize layers

## 2021-03-23 NOTE — PROGRESS NOTES
Problem: Falls - Risk of  Goal: *Absence of Falls  Description: Document Jaydon Parker Fall Risk and appropriate interventions in the flowsheet. Outcome: Progressing Towards Goal  Note: Fall Risk Interventions:       Mentation Interventions: Adequate sleep, hydration, pain control, Bed/chair exit alarm, Door open when patient unattended, Evaluate medications/consider consulting pharmacy, Reorient patient, Room close to nurse's station, Family/sitter at bedside    Medication Interventions: Bed/chair exit alarm, Evaluate medications/consider consulting pharmacy    Elimination Interventions: Bed/chair exit alarm, Call light in reach              Problem: Patient Education: Go to Patient Education Activity  Goal: Patient/Family Education  Outcome: Progressing Towards Goal     Problem: Pressure Injury - Risk of  Goal: *Prevention of pressure injury  Description: Document Antelmo Scale and appropriate interventions in the flowsheet.   Outcome: Progressing Towards Goal  Note: Pressure Injury Interventions:  Sensory Interventions: Assess changes in LOC, Float heels, Minimize linen layers, Keep linens dry and wrinkle-free, Check visual cues for pain, Pad between skin to skin    Moisture Interventions: Absorbent underpads, Maintain skin hydration (lotion/cream), Minimize layers, Apply protective barrier, creams and emollients, Internal/External urinary devices, Moisture barrier, Limit adult briefs    Activity Interventions: Pressure redistribution bed/mattress(bed type)    Mobility Interventions: Pressure redistribution bed/mattress (bed type), Float heels    Nutrition Interventions: Document food/fluid/supplement intake    Friction and Shear Interventions: Minimize layers, Lift sheet, Apply protective barrier, creams and emollients

## 2021-03-23 NOTE — PROGRESS NOTES
Progress Note    Patient: Rigo Haddad MRN: [id number]  SSN: [id number]    YOB: 1940  Age: 68 y.o. Sex: male      Admit Date: 3/19/2021    LOS: 3 days     Subjective:     Confused. Eating bites and sips. Pocketing food. Has required glycopyrrolate x 1 for secretions. Review of Systems:  Denies pain. Objective:     Vitals:    03/21/21 0348 03/21/21 1813 03/22/21 0334 03/22/21 1752   BP: (!) 172/89 (!) 160/97 (!) 162/87 136/79   Pulse: 98 (!) 103 97 (!) 101   Resp: 16 18 16 17   Temp: 97.4 °F (36.3 °C) 97.5 °F (36.4 °C) (!) 96.6 °F (35.9 °C) (!) 96.1 °F (35.6 °C)        Intake and Output:  Current Shift: No intake/output data recorded. Last three shifts: 03/21 0701 - 03/22 1900  In: 390 [P.O.:390]  Out: 1050 [Urine:1050]    Physical Exam:   GENERAL: fatigued, cooperative, mild distress, appears older than stated age, pale  LUNG: Clear diminished breath sounds with labored respirations on exertion. HEART: irregularly irregular rhythm  ABDOMEN: soft, non-tender. Bowel sounds hypoactive. : Lakhani catheter with dark yellow urine. EXTREMITIES:  Extremities with no cyanosis. Moderate generalized edema. SKIN: Pale. Warm to touch. Scattered ecchymoses. Skin tear to left forearm with dressing intact. NEUROLOGIC: Lethargic. Oriented to self. Generalized weakness. Bedbound. Lab/Data Review:  No new labs resulted in the last 24 hours.     Assessment:     Principal Problem:    Primary malignant neoplasm of lung metastatic to other site [facility]) (12/28/2020)    Active Problems:    Acute hypoxemic respiratory failure ([address] (3/12/2021)      Metastatic lung cancer (metastasis from lung to other site) [facility]) (3/19/2021)        Plan:     Current Facility-Administered Medications   Medication Dose Route Frequency    albuterol-ipratropium (DUO-NEB) 2.5 MG-0.5 MG/3 ML  3 mL Nebulization Q8H    albuterol-ipratropium (DUO-NEB) 2.5 MG-0.5 MG/3 ML  3 mL Nebulization Q4H PRN    dexamethasone (DECADRON) 4 mg/mL injection 4 mg  4 mg IntraVENous BID WITH MEALS    polyethylene glycol (MIRALAX) packet 17 g  17 g Oral DAILY PRN    sodium chloride (NS) flush 3 mL  3 mL IntraVENous PRN    morphine (ROXANOL) concentrated oral syringe 10 mg  10 mg Oral Q30MIN PRN    Or    morphine (ROXANOL) concentrated oral syringe 10 mg  10 mg SubLINGual Q30MIN PRN    morphine injection 4 mg  4 mg SubCUTAneous Q20MIN PRN    Or    morphine injection 4 mg  4 mg IntraVENous Q20MIN PRN    acetaminophen (TYLENOL) tablet 650 mg  650 mg Oral Q4H PRN    LORazepam (ATIVAN) injection 1 mg  1 mg IntraVENous Q2H PRN    loperamide (IMODIUM) capsule 4 mg  4 mg Oral PRN    haloperidol lactate (HALDOL) injection 2 mg  2 mg SubCUTAneous Q1H PRN    Or    haloperidol lactate (HALDOL) injection 2 mg  2 mg IntraVENous Q1H PRN    hyoscyamine SL (LEVSIN/SL) tablet 0.125 mg  0.125 mg SubLINGual Q4H PRN    glycopyrrolate (ROBINUL) injection 0.2 mg  0.2 mg SubCUTAneous Q4H PRN    LORazepam (ATIVAN) injection 2 mg  2 mg IntraVENous Q10MIN PRN    sodium chloride (NS) flush 3 mL  3 mL IntraVENous Q12H     3/19: (SFE) Admitted GIP with metastatic lung cancer for management of pain, dyspnea and agitation. 1. Pain: Morphine 4mg IV/SQ Q20 minutes as needed. 2. Dyspnea: Morphine 4mg IV/SQ Q20 minutes as needed. Duonebs q4 prn. Glycopyrrolate 0.2mg q4 prn secretions. Oxygen at 3 L/min prn.    3. Agitation: Haloperidol 2mg IV/SQ Q1 hour prn and Lorazepam 1mg IV/IM q2 hours prn.    4. Family/Pt Support: No family at bedside during exam. Medications and plan of care discussed with nursing staff. Will continue to monitor for symptoms and adjust medications as needed to maintain patient comfort. PPS 20%. Case discussed with Dr. Ricardo Traore and [facility]. No change in plan of care.      Signed By: Rose Dillon NP     March 22, 2021

## 2021-03-23 NOTE — PROGRESS NOTES
Problem: Falls - Risk of  Goal: *Absence of Falls  Description: Document Reynold Lobe Fall Risk and appropriate interventions in the flowsheet. Outcome: Progressing Towards Goal  Note: Fall Risk Interventions:       Mentation Interventions: Adequate sleep, hydration, pain control, Bed/chair exit alarm, Door open when patient unattended, Evaluate medications/consider consulting pharmacy, Reorient patient, Room close to nurse's station, Family/sitter at bedside    Medication Interventions: Bed/chair exit alarm, Evaluate medications/consider consulting pharmacy    Elimination Interventions: Bed/chair exit alarm, Call light in reach              Problem: Pressure Injury - Risk of  Goal: *Prevention of pressure injury  Description: Document Antelmo Scale and appropriate interventions in the flowsheet.   Outcome: Progressing Towards Goal  Note: Pressure Injury Interventions:  Sensory Interventions: Assess changes in LOC, Float heels, Minimize linen layers, Keep linens dry and wrinkle-free, Check visual cues for pain, Pad between skin to skin    Moisture Interventions: Absorbent underpads, Maintain skin hydration (lotion/cream), Minimize layers, Apply protective barrier, creams and emollients, Internal/External urinary devices, Moisture barrier, Limit adult briefs    Activity Interventions: Pressure redistribution bed/mattress(bed type)    Mobility Interventions: Pressure redistribution bed/mattress (bed type), Float heels    Nutrition Interventions: Document food/fluid/supplement intake    Friction and Shear Interventions: Minimize layers, Lift sheet, Apply protective barrier, creams and emollients

## 2021-03-23 NOTE — PROGRESS NOTES
0700- Bedside report received, patient to resting quietly in bed. Patient with s/sx pain or distress. Call light in reach, bed is low and locked, tab alert in place, and door left open for continuous monitoring. No change to discharge plan pt to remain at [place] till passing. 0277- Pt in bed with eyes closed, breathing even and unlabored, no s/sx of pain or distress noted. No facial grimace. FLACC of 0. Lungs diminished and coarse. Bowel sound hypoactive, BLE 3+ edema, BUE 2+ edema, rodgers cath draining rodrigo urine, side rails up x2. Bed low/locked call light in reach of pt.    0845- Notified by CNA pt is pocketing food. Pt calm and pleasantly confused answering some question but with only one or two words. Denies any pain or discomfort at this time will continue to monitor. 1030- Pt in bed with eyes closed resting quietly breathing even and unlabored. No s/sx of pain or distress noted, no facial grimace FLACC=0. Door open for conintious monitoring. 1230- Pt in bed with eyes closed resting quietly breathing even and unlabored. No s/sx of pain or distress noted, no facial grimace FLACC=0. Door open for conintious monitoring. 5- Notified by CNA is pocketing his food again, informed FNP about this behavior new order received. 1519- Pt in bed with eyes closed resting quietly breathing even and unlabored. No s/sx of pain or distress noted, no facial grimace FLACC=0. Door open for conintious monitoring      1700-  Pt in bed with eyes closed resting quietly breathing even and unlabored. No s/sx of pain or distress noted, no facial grimace FLACC=0.  Door open for conintious monitoring     Report given to Evelin Nguyen Rn

## 2021-03-23 NOTE — PROGRESS NOTES
184  Report received from [facility], YULISA and Fallon Marley RN. Pt Id by name and  during rounds. 1920  Pt lying in bed. Eyes opened. Non interactive. No facial grimace. Flacc =0-1. Resp shallow irreg on RA. Lungs diminished. HR irreg. BS hypo. Edema  3+ in RLE and 2+  noted in all other extremities. Lakhani cath draining cloudy rodrigo urine. SR up x 2. Bed low/locked. Call light with in reach. Door opened.   Scheduled neb tx given. Left chest port flushed with 3 ml ns. Good blood return noted. Flushed well. Dressing clean/dry/intact. Pt tolerated well. 2320  Pt resting in bed. Eyes closed. No facial grimace. Flacc =0-1. Resp shallow irreg on 3 L n/c. SR up x 2. Bed low/locked. Call light with in reach. Door opened. 0105   Pt resting in bed. Eyes closed. No facial grimace. Flacc =0-1. Resp shallow irreg on 3 L n/c. SR up x 2. Bed low/locked. Call light with in reach. Door opened. 0320  Pt resting in bed. Eyes closed. No facial grimace. Flacc =0-1. Resp shallow irreg on 3 L n/c. SR up x 2. Bed low/locked. Call light with in reach. Door opened. 0535  Pt with eyes closed. Resting comfortably. No s/sx of distress. No facial grimace. Flacc =0-1. Resp shallow irreg non labored on 3 L n/c. Scheduled neb tx given. SR up x 2. Bed low/locked. Call light with in reach. Door opened. 9542  Pt with facial grimace and moans during personal care. Flacc =4. Morphine 4 mg IVP given.      Report given to Lee Perry RN

## 2021-03-23 NOTE — PROGRESS NOTES
Follow up:    Mrs. Steph Alcantara had some paperwork provided by the . She said she would need to take  It home to get some of the information.  agreed to talk with  for some support.  contacted LUCIA Hall. He ask  to assure her she had time to do the paperwork and not to worry.

## 2021-03-23 NOTE — PROGRESS NOTES
[address] report from Neha Chapman RN. Pt Id by name and  during rounds.   Pt lying in bed. Eyes closed. No s/sx of distress. Non verbal at this time. No facial grimace. Flacc =0-1. Resp irreg non labored on RA. Lungs diminished. HR irreg. BS hypo. Edema 3 + noted in all extremities. Lakhani cath draining cloudy rodrigo urine. SR up x 2. Bed low/locked. Call light with in reach. Family at the bedside.   Scheduled neb tx given. Scheduled line flushe given. Line flushed well. Dressing clean/dry/intact. Pt tolerated well. 2315  Pt with eyes closed. No facial grimace. Flacc =0-1. Resp non labored on RA. SR up x 2. Bed low/locked. Call light with in reach. Door opened. 0110  Pt resting comfortably with eyes closed. No facial grimace. Flacc =0-1. Resp shallow non labored on RA. SR up x 2. Bed low/locked. Call light with in reach. Door opened. 0310  Pt with eyes closed. No facial grimace. Flacc =0-1. Resp non labored on RA. SR up x 2. Bed low/locked. Call light with in reach. Door opened. 0536  Pt resting comfortably with eyes closed. No facial grimace. Flacc =0-1. Resp shallow non labored on RA. Scheduled neb tx given. Pt tolerated well. SR up x 2. Bed low/locked. Call light with in reach. Door opened. Report given to Evert Serrano RN and Jaxon Ocampo RN.

## 2021-03-24 NOTE — PROGRESS NOTES
0645: Report received from off going RN. 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 Pt was changed to routine level of care on 3/23/21. During the night he received one dose of Morphine 4mg IVP for comfort. Presently he is resting with eyes closed, resps regular and non-labored. No s/sx of pain or other discomfort at this time. FLACC score is 0/10.     1016: Scheduled medications administered. Physical assessment complete. FLACC score 0/10. Pt also denies pain when asked. 1200: Attempted to feed pt some lunch. Spoonful of pudding given and pt held in mouth and attempted to swallow several times. Two spoonfuls of thickened apple juice given and pt with delayed swallowing then several swallows and coughing to clear throat at which point feeding was stopped and pt agreed. 1310: Scheduled duo neb administered as well as hyoscyamine 0.125mg SL to help decrease secretions. Pt continues to swallow but unable to clear secretions and has a congested cough. drsg to left arm removed and skin tear is now healed so area left open to air. 1420: Spouse called for an update and level of care reviewed. Pt remains in bed, alert, can answer yes and no to some questions but often times has delayed responses. FLACC score at this time is 0/10.     1700: Aide reports pt coughing and choking with dinner so meal was stopped. Pt without any s/sx of pain or increased secretion at this time. FLACC score 0/10.     6304: report given to oncoming RN.

## 2021-03-24 NOTE — ADVANCED PRACTICE NURSE
Change to routine level care today. No prn medications in the last 48 hours and no changes in plan of care. Nursing supervisor has notified the family.

## 2021-03-24 NOTE — PROGRESS NOTES
Problem: Falls - Risk of  Goal: *Absence of Falls  Description: Document Abundio Brooks Fall Risk and appropriate interventions in the flowsheet. Outcome: Progressing Towards Goal  Note: Fall Risk Interventions:       Mentation Interventions: Adequate sleep, hydration, pain control, Bed/chair exit alarm, Door open when patient unattended, Evaluate medications/consider consulting pharmacy, Family/sitter at bedside, Reorient patient    Medication Interventions: Evaluate medications/consider consulting pharmacy, Bed/chair exit alarm    Elimination Interventions: Bed/chair exit alarm, Call light in reach              Problem: Pressure Injury - Risk of  Goal: *Prevention of pressure injury  Description: Document Antelmo Scale and appropriate interventions in the flowsheet.   Outcome: Progressing Towards Goal  Note: Pressure Injury Interventions:  Sensory Interventions: Assess changes in LOC, Float heels, Minimize linen layers, Keep linens dry and wrinkle-free, Check visual cues for pain, Pad between skin to skin    Moisture Interventions: Absorbent underpads, Maintain skin hydration (lotion/cream), Minimize layers, Apply protective barrier, creams and emollients, Internal/External urinary devices, Moisture barrier, Limit adult briefs    Activity Interventions: Pressure redistribution bed/mattress(bed type)    Mobility Interventions: Pressure redistribution bed/mattress (bed type), Float heels    Nutrition Interventions: Document food/fluid/supplement intake    Friction and Shear Interventions: Apply protective barrier, creams and emollients, Minimize layers, Lift sheet                Problem: Pain  Goal: *Control of Pain  Outcome: Progressing Towards Goal

## 2021-03-25 PROBLEM — Z51.5 HOSPICE CARE PATIENT: Status: ACTIVE | Noted: 2021-01-01

## 2021-03-25 NOTE — PROGRESS NOTES
Problem: Falls - Risk of  Goal: *Absence of Falls  Description: Document Igor Li Fall Risk and appropriate interventions in the flowsheet. Outcome: Progressing Towards Goal  Note: Fall Risk Interventions:       Mentation Interventions: Adequate sleep, hydration, pain control, Bed/chair exit alarm, Door open when patient unattended, Evaluate medications/consider consulting pharmacy, Reorient patient, Room close to nurse's station    Medication Interventions: Bed/chair exit alarm, Evaluate medications/consider consulting pharmacy    Elimination Interventions: Bed/chair exit alarm, Call light in reach              Problem: Pressure Injury - Risk of  Goal: *Prevention of pressure injury  Description: Document Antelmo Scale and appropriate interventions in the flowsheet.   Outcome: Progressing Towards Goal  Note: Pressure Injury Interventions:  Sensory Interventions: Assess changes in LOC, Check visual cues for pain, Float heels, Keep linens dry and wrinkle-free, Minimize linen layers, Pad between skin to skin    Moisture Interventions: Absorbent underpads, Apply protective barrier, creams and emollients, Assess need for specialty bed, Internal/External urinary devices, Limit adult briefs, Maintain skin hydration (lotion/cream), Minimize layers, Moisture barrier    Activity Interventions: Pressure redistribution bed/mattress(bed type)    Mobility Interventions: Float heels, Pressure redistribution bed/mattress (bed type)    Nutrition Interventions: Document food/fluid/supplement intake    Friction and Shear Interventions: Apply protective barrier, creams and emollients, Lift sheet, Minimize layers                Problem: Pain  Goal: *Control of Pain  Outcome: Progressing Towards Goal

## 2021-03-25 NOTE — PROGRESS NOTES
Report received from Salvador Tay RN. Patient identified by name and . Patient in bed with eyes closed. No signs or symptoms of, anxiety, dyspnea, or nausea/vomiting. Oxygen at 3 lpm via NC. Lakhani to gravity drain with rodrigo urine draining. FLAAC 2/10. Safety measures in place including, door open for continuous monitoring, bed in low locked position, tab alert placed and functional, and side rails up x2. Will continue to monitor for changes and for needs.  Patient lying in bed with his eyes open. Patient with cough that appears to be having difficulty clearing. When asked if he would likd something for pain the patient nods his head ans whispers \"yes. \" Morphine 4mg IVP and Robinul 0.2mg SC given for pain and secretions. Safety measures in place including, door open for continuous monitoring, bed in low locked position, tab alert placed and functional, and side rails up x2. Will continue to monitor for changes and for needs.  Patient lying in bed with no acute distress noted or reported Patient appears comfortable at this time. Safety measures in place including, door open for continuous monitoring, bed in low locked position, tab alert placed and functional, and side rails up x2. Will continue to monitor for changes and for needs.  Patient repositioned in bed for comfort and to prevent skin breakdown. Patient appears to be uncomfortable and his FLACC 4/10. Morphine 4mg IVP given per MD order. Patient also given his Albuterol to help decrease his congestion. Patient tolerated his treatment fairly. Safety measures in place including, door open for continuous monitoring, bed in low locked position, tab alert placed and functional, and side rails up x2. Will continue to monitor for changes and for needs. [id number] Patient lying in bed on his back in no acute distress. Patient appears to be relaxed at this time, his respirations appear less labored. His extremities are currently relaxed.  He does not sound as if he is trying to cough up phlegm that won't come up. Safety measures in place including, door open for continuous monitoring, bed in low locked position, tab alert placed and functional, and side rails up x2. Will continue to monitor for changes and for needs. [id number] Patient lying on his back in bed, respirations are even and unlabored. Limbs are relaxed. No acute distress noted at this time. Patient lying in bed with no acute distress. Patients respirations are even and unlabored. She appears to be comfortable, her extremities are relaxed. FLACC 0/10. Safety measures in place including, door open for continuous monitoring, bed in low locked position, tab alert placed and functional, and side rails up x2. Will continue to monitor for changes and for needs. [id number] Patient awakens to verbal stimuli when awoken to take his nebulizer treatment. Patient continues to have a cough with phlegm he is unable to cough up. He does not appear to be in any respiratory distress other than hearing what is collecting in his throat. Patient returns to sleep quickly following his nebulizer treatment.    8217 Patient lying in bed with eyes closed and respirations are even and unlabored. Safety measures in place including, door open for continuous monitoring, bed in low locked position, tab alert placed and functional, and side rails up x2. Will continue to monitor for changes and for needs.     Report given to Clemente Galvan RN

## 2021-03-26 NOTE — PROGRESS NOTES
[id number] Report received from Heena Delgado RN. Patient identified by name and . Patient in bed with eyes closed. No signs or symptoms of pain,anxiety, dyspnea, or nausea/vomiting. . Oxygen at 3 lpm via NC. Lakhani to gravity drain with rodrigo urine draining. FLAAC 4/10. Safety measures in place including, door open for continuous monitoring, bed in low locked position, tab alert placed and functional, and side rails up x2. Will continue to monitor for changes and for needs.  Patient appears to be in pain once he has been awoken to be changed and turned. Once asked if he needed anything for pain he shakes his head yes and whispers \"yes\". Morphine 2mg IVP given for pain. Safety measures in place including, door open for continuous monitoring, bed in low locked position, tab alert placed and functional, and side rails up x2. Will continue to monitor for changes and for needs.  Patient lying in bed with eyes closed in no acute distress observed. Patient is relaxed in bed without signs of pain. FLACC 0/10. Safety measures in place including, door open for continuous monitoring, bed in low locked position, tab alert placed and functional, and side rails up x2. Will continue to monitor for changes and for needs. 15 Patient continues to lay in bed with his eyes closed in no acute distress noted. FLACC 0/10. Safety measures in place including, door open for continuous monitoring, bed in low locked position, tab alert placed and functional, and side rails up x2. Will continue to monitor for changes and for needs. 6539 Patient resting quietly without any acute distress noted. FLACC 0/10. Safety measures in place including, door open for continuous monitoring, bed in low locked position, tab alert placed and functional, and side rails up x2. Will continue to monitor for changes and for needs. 0944 Patient resting quietly with no acute distress noted.  Safety measures in place including, door open for continuous monitoring, bed in low locked position, tab alert placed and functional, and side rails up x2. Will continue to monitor for changes and for needs.     Report given to Dale Sanchez RN

## 2021-03-26 NOTE — PROGRESS NOTES
Problem: Falls - Risk of  Goal: *Absence of Falls  Description: Document Leafy Martinez Fall Risk and appropriate interventions in the flowsheet. Outcome: Progressing Towards Goal  Note: Fall Risk Interventions:       Mentation Interventions: Adequate sleep, hydration, pain control, Bed/chair exit alarm, Door open when patient unattended, Reorient patient, Update white board    Medication Interventions: Bed/chair exit alarm    Elimination Interventions: Bed/chair exit alarm, Call light in reach              Problem: Pressure Injury - Risk of  Goal: *Prevention of pressure injury  Description: Document Antelmo Scale and appropriate interventions in the flowsheet.   Outcome: Progressing Towards Goal  Note: Pressure Injury Interventions:  Sensory Interventions: Assess changes in LOC, Avoid rigorous massage over bony prominences, Check visual cues for pain, Float heels, Pad between skin to skin, Pressure redistribution bed/mattress (bed type)    Moisture Interventions: Absorbent underpads, Apply protective barrier, creams and emollients, Internal/External urinary devices, Moisture barrier    Activity Interventions: Pressure redistribution bed/mattress(bed type)    Mobility Interventions: Float heels, Pressure redistribution bed/mattress (bed type)    Nutrition Interventions: Document food/fluid/supplement intake    Friction and Shear Interventions: Apply protective barrier, creams and emollients, Lift team/patient mobility team                Problem: Pain  Goal: *Control of Pain  Outcome: Progressing Towards Goal

## 2021-03-26 NOTE — [facility] NOTES
Patient: Alexandra Homans    Date: 03/26/21  Time: 12:16 PM    [facility]  Notes  Intervention: Ministry of presence, conversation with wife and her friend. Prayer with patient and family. Active listening. Words of encouragement. Outcome: Wife was able to voice concerns of how she will care for her  in the event he returns home. Plan: Continue to provide opportunity for open communication. Spiritual rituals as appropriate.        Signed by: Obinna Last

## 2021-03-26 NOTE — [facility] IDG MASTER NOTE
Hospice Interdisciplinary Group Collaborative  Date: 03/26/21  Time: 2:47 PM    ___________________    Patient: Alexandra Homans  Coverage Information:     Payor: [facility] MEDICARE     Plan: [facility] MEDICARE PART A AND B     Subscriber ID: [id number]     Phone Number:   MRN: [id number]  Current Benefit Period: Benefit Period 1  Start Date: 3/19/2021  End Date: 6/16/2021        Hospice Attending Provider: Kemar Ziegler [address] [place] [address]  [id number]  Phone: [phone]  Fax: [phone]    Level of Care: General Inpatient Care      ___________________    Diagnoses: There were no encounter diagnoses.     Current Medications:    Current Facility-Administered Medications:     glycopyrrolate (ROBINUL) injection 0.2 mg, 0.2 mg, IntraVENous, Q4H PRN, Rocio Walsh NP    fentaNYL (DURAGESIC) 25 mcg/hr patch 1 Patch, 1 Patch, TransDERmal, Q72H, Rocio Walsh NP, 1 Patch at 03/26/21 1342    sodium chloride (NS) flush 10 mL, 10 mL, IntraVENous, PRN, Judd Ni MD, 10 mL at 03/26/21 1413    sodium chloride (NS) flush 10 mL, 10 mL, IntraVENous, Q12H, Kayleigh Hernandez MD, 10 mL at 03/26/21 1123    heparin (porcine) pf 300 Units, 300 Units, InterCATHeter, Q12H, Judd Ni MD, 300 Units at 03/26/21 1123    acetaminophen (TYLENOL) suppository 650 mg, 650 mg, Rectal, Q3H PRN, Rocio Walsh NP    bisacodyL (DULCOLAX) suppository 10 mg, 10 mg, Rectal, PRN, Rocio Walsh NP    albuterol-ipratropium (DUO-NEB) 2.5 MG-0.5 MG/3 ML, 3 mL, Nebulization, Q8H, Rocio Walsh NP, 3 mL at 03/26/21 1342    albuterol-ipratropium (DUO-NEB) 2.5 MG-0.5 MG/3 ML, 3 mL, Nebulization, Q4H PRN, Rocio Walsh NP    morphine injection 4 mg, 4 mg, SubCUTAneous, Q20MIN PRN **OR** morphine injection 4 mg, 4 mg, IntraVENous, Q20MIN PRN, Judd Ni MD, 4 mg at 03/26/21 1412    LORazepam (ATIVAN) injection 1 mg, 1 mg, IntraVENous, Q2H PRN, Judd Ni MD, 1 mg at 03/26/21 5546    haloperidol lactate (HALDOL) injection 2 mg, 2 mg, SubCUTAneous, Q1H PRN **OR** haloperidol lactate (HALDOL) injection 2 mg, 2 mg, IntraVENous, Q1H PRN, Dominga Smith MD, 2 mg at 03/26/21 1412    LORazepam (ATIVAN) injection 2 mg, 2 mg, IntraVENous, Q10MIN PRN, Dominga Smith MD    Orders:  Orders Placed This Encounter    IP CONSULT TO SPIRITUAL CARE     Standing Status:   Standing     Number of Occurrences:   1     Order Specific Question:   Reason for Consult: Answer: Once on week one, then PRN. For [facility] Patients Only. For contracted patients, their primary hospice will continue to manage spiritual care needs.  DIET PLEASURE 3 Honey/3 Moderately Thick     Standing Status:   Standing     Number of Occurrences:   1     Order Specific Question:   Likes/Dislikes/Preferences     Answer:   please hold tray, nursing will call if tray needed. Order Specific Question:   Consistency/Thickener:     Answer:   3 Honey/3 Moderately Thick    VITAL SIGNS     Standing Status:   Standing     Number of Occurrences:   09576    NURSING-MISCELLANEOUS: Comfort Care Measures CONTINUOUS     Standing Status:   Standing     Number of Occurrences:   1     Order Specific Question:   Description of Order:     Answer:   Comfort Care Measures    BLADDER CHECKS     May scan bladder PRN for urinary retention and or patient discomfort     Standing Status:   Standing     Number of Occurrences:   83025    PAIN ASSESSMENT Pain and Symptoms: Assess ever 4 hours and PRN, for GIP level of care. PRN Routine     Standing Status:   Standing     Number of Occurrences:   51826     Order Specific Question:   Please describe the test or procedure you would like to order. Answer:   Pain and Symptoms: Assess ever 4 hours and PRN, for GIP level of care.     BEDREST, COMPLETE     Standing Status:   Standing     Number of Occurrences:   1    VITAL SIGNS PER UNIT ROUTINE     Standing Status:   Standing     Number of Occurrences:   1    BRADLEY CATHETER, CARE     1. Lakhani Catheter care every shift and PRN  2. Notify Physician of Lakhani Catheter leakage, occlusion, gross adherent sediment or accidental removal  3. Change Lakhani 30 days after insertion. 4. May flush catheter bid and prn leakage or gross adherent sediment or mucus. Standing Status:   Standing     Number of Occurrences:   1    NURSING ASSESSMENT:  SPECIFY Assess for GIP, routine or respite level of care Q SHIFT Routine     Standing Status:   Standing     Number of Occurrences:   1     Order Specific Question:   Please describe the test or procedure you would like to order. Answer:   Assess for GIP, routine or respite level of care    NURSING-MISCELLANEOUS: admit 3/19: (SFE) Admitted GIP with metastatic lung cancer for management of pain, dyspnea and agitation. DIAGNOSIS: Metastatic Lung Cancer (acute hypoxic respiratory failure, multiple intrathoracic metastases, malignant int. .. 3/19: (SFE) Admitted GIP with metastatic lung cancer for management of pain, dyspnea and agitation. DIAGNOSIS: Metastatic Lung Cancer (acute hypoxic respiratory failure, multiple intrathoracic metastases, malignant intrathoracic lymphadenopathy, spinal metastases, aspiration pneumonia, chronic ethanol abuse)      SUMMARY: Patient is a 68 y.o. male with PMH of including but not limited to stroke, HLD, HTN, hx of stage 4 lung cancer, thyroid disease, who is seen in consultation at the request of Dr. Marsha Serrato  for GIB. Pt recently admitted for suspected CVA. He was discharged 3/9/2021. EMS called to rehab facility 3/12 for respiratory distress. He was intubated and brought to ED. Labs with elevated WBC, KEI, hypernatremia. CXR with hazy infiltrate left base. He was started on antibiotics. He was admitted to ICU and started on sedation. He has been made DNR. He was extubated 3/14/21. Currently NPO. Remains on Zosyn.  HGB 9.4 (down from 12.1 at discharge 3/9/2021 and 10.9 at admission 3/12/2021) normocytic. PLT 80. BUN 52, Cr 1.19. Underwent swallowing study today. No overt bleeding noted. He is receiving pantoprazole BID. Heparin has been held. The patient is a swallowing study was positive and he continues aspirating. The family desires no feeding tube. It is not felt that further invasive diagnostic or therapeutic interventions would be of benefit. The patient is a DNR. CERTIFICATION: The patient is appropriate for hospice level care. Death is likely in the next 6 weeks. Standing Status:   Standing     Number of Occurrences:   1     Order Specific Question:   Description of Order:     Answer:   admit    DRESSING CHANGE - PICC, [address], SUBCUTANEOUS AND ACCESSED PORT DRESSING CHANGE     PICC, [address], SUBCUTANEOUS AND ACCESSED PORT DRESSING CHANGE:  Change catheter site dressing every 5 days and prn if site dressing becomes damp loosened or visibly soiled       Standing Status:   Standing     Number of Occurrences:   [id number]    NURSING-MISCELLANEOUS: Level of care change: 3/23: Change to routine level of care 3/26: Change to [facility] level of care for management of pain, dyspnea, dysphagia and agitation. CONTINUOUS     3/23: Change to routine level of care  3/26: Change to [facility] level of care for management of pain, dyspnea, dysphagia and agitation. Standing Status:   Standing     Number of Occurrences:   1     Order Specific Question:   Description of Order:     Answer:   Level of care change:    WOUND CARE, DRESSING CHANGE     Wound Care:  Location: left arm  Skin Tear: Cleanse skin with wound cleanser. Cover with non stick dressing and secure with kerlix wrap. Change every 3 days. Assess every shift and PRN. Standing Status:   Standing     Number of Occurrences:   5    DO NOT RESUSCITATE     Standing Status:   Standing     Number of Occurrences:   1     Order Specific Question:   Comfort Measures Only? Answer:    Yes    OXYGEN CANNULA Liters per minute: 3; Indications for O2 therapy: HYPOXIA PRN Routine     Standing Status:   Standing     Number of Occurrences:   03462     Order Specific Question:   Liters per minute:      Answer:   3     Order Specific Question:   Indications for O2 therapy     Answer:   HYPOXIA    DISCONTD: sodium chloride (NS) flush 3 mL    DISCONTD: morphine (ROXANOL) concentrated oral syringe 10 mg    DISCONTD: morphine (ROXANOL) concentrated oral syringe 10 mg    OR Linked Order Group     morphine injection 4 mg     morphine injection 4 mg    DISCONTD: acetaminophen (TYLENOL) tablet 650 mg    LORazepam (ATIVAN) injection 1 mg    DISCONTD: loperamide (IMODIUM) capsule 4 mg    DISCONTD: senna (SENOKOT) tablet 8.6 mg    OR Linked Order Group     haloperidol lactate (HALDOL) injection 2 mg     haloperidol lactate (HALDOL) injection 2 mg    DISCONTD: hyoscyamine SL (LEVSIN/SL) tablet 0.125 mg    DISCONTD: glycopyrrolate (ROBINUL) injection 0.2 mg    LORazepam (ATIVAN) injection 2 mg    DISCONTD: dexamethasone (DECADRON) 4 mg/mL injection 4 mg    DISCONTD: sodium chloride (NS) flush 3 mL    albuterol-ipratropium (DUO-NEB) 2.5 MG-0.5 MG/3 ML     Order Specific Question:   MODE OF DELIVERY     Answer:   Nebulizer    albuterol-ipratropium (DUO-NEB) 2.5 MG-0.5 MG/3 ML     Order Specific Question:   MODE OF DELIVERY     Answer:   Nebulizer    dexamethasone (DECADRON) 4 mg/mL injection 4 mg    DISCONTD: polyethylene glycol (MIRALAX) packet 17 g    acetaminophen (TYLENOL) suppository 650 mg    bisacodyL (DULCOLAX) suppository 10 mg    sodium chloride (NS) flush 10 mL    sodium chloride (NS) flush 10 mL    DISCONTD: heparin (porcine) pf 300 Units    heparin (porcine) pf 300 Units    DISCONTD: LORazepam (ATIVAN) tablet 1 mg    DISCONTD: fentaNYL (DURAGESIC) 25 mcg/hr patch 1 Patch    glycopyrrolate (ROBINUL) injection 0.2 mg    fentaNYL (DURAGESIC) 25 mcg/hr patch 1 Patch    INITIAL PHYSICIAN ORDER: HOSPICE Level Of Care: General Inpatient; Reason for Admission: Metastatic LUNG Cancer, pulmonary aspiration for respiratory and pain management. Standing Status:   Standing     Number of Occurrences:   1     Order Specific Question:   Status     Answer:   Hospice     Order Specific Question:   Level Of Care     Answer:   General Inpatient     Order Specific Question:   Reason for Admission     Answer:   Metastatic LUNG Cancer, pulmonary aspiration for respiratory and pain management. Order Specific Question:   Inpatient Hospitalization Certified Necessary for the Following Reasons     Answer:   3. Patient receiving treatment that can only be provided in an inpatient setting (further clarification in H&P documentation)     Order Specific Question:   Admitting Diagnosis     Answer:   Metastatic lung cancer (metastasis from lung to other site) [facility]) [[id number]]     Order Specific Question:   Terminal Prognosis Diagnosis(es)     Answer:   Metastatic lung cancer (metastasis from lung to other site) [facility]) [[id number]]     Order Specific Question:   Admitting Physician     Answer:   Tammy Ricks     Order Specific Question:   Attending Physician     Answer:   Song Fregoso [[id number]]    IP CONSULT TO SOCIAL WORK     Standing Status:   Standing     Number of Occurrences:   1     Order Specific Question:   Reason for Consult: Answer: For [facility] Patients Only. For contracted patients, their primary hospice will continue to manage social work needs. Allergies:  No Known Allergies    Care Plan:  Multidisciplinary Problems (Active)     Problem: Falls - Risk of     Dates: Start: 03/19/21       Disciplines: Interdisciplinary    Goal: *Absence of Falls     Dates: Start: 03/19/21   Expected End: 03/27/21       Description: Document Lisandra Starch Fall Risk and appropriate interventions in the flowsheet.     Disciplines: Interdisciplinary                Problem: Pain     Dates: Start: 03/23/21       Disciplines: Nurse, Interdisciplinary, RT    Goal: *Control of Pain     Dates: Start: 03/23/21   Expected End: 03/30/21       Disciplines: Nurse, Interdisciplinary, RT    Intervention: Assess pain characteristics (eg: Intensity scale; onset; location; quality; severity; duration; frequency; radiation)     Dates: Start: 03/23/21             Intervention: Assess pain management - barriers (eg: Past pain experiences)     Dates: Start: 03/23/21             Intervention: Identify pain expectations (eg: Patient's pain goal; somatic experiences; behavioral changes; affect)     Dates: Start: 03/23/21             Intervention: Support system identification (eg: Caregiver; community resource; family; friends; Yarsanism; support group)     Dates: Start: 03/23/21             Intervention: Monitor for change in patient condition (eg:  Vital signs changes; changes in level of consciousness; nausea; behavioral changes)     Dates: Start: 03/23/21             Intervention: Pain-relief response reassessment (eg: Frequency based on route of administration; effectiveness)     Dates: Start: 03/23/21                         Problem: Patient Education: Go to Patient Education Activity     Dates: Start: 03/19/21       Disciplines: Interdisciplinary    Goal: Patient/Family Education     Dates: Start: 03/19/21   Expected End: 04/01/21       Disciplines: Interdisciplinary                Problem: Patient Education: Go to Patient Education Activity     Dates: Start: 03/19/21       Disciplines: Interdisciplinary    Goal: Patient/Family Education     Dates: Start: 03/19/21   Expected End: 04/02/21       Disciplines: Interdisciplinary                Problem: Pressure Injury - Risk of     Dates: Start: 03/19/21       Disciplines: Interdisciplinary    Goal: *Prevention of pressure injury     Dates: Start: 03/19/21   Expected End: 03/27/21       Description: Document Antelmo Scale and appropriate interventions in the flowsheet.    Disciplines: Interdisciplinary                  Care Plan Problems/Goals       Progressing Towards Goal (4)      *Absence of Falls (Falls - Risk of)    Disciplines:  Interdisciplinary Expected end:  03/27/21        Outcome: Progressing Towards Goal By Phil Penn RN on 03/26/21 5692            Patient/Family Education (Patient Education: Go to Patient Education Activity)    Disciplines:  Interdisciplinary Expected end:  04/01/21        Outcome: Progressing Towards Goal By Jamia Crespo on 03/23/21 1131            *Prevention of pressure injury (Pressure Injury - Risk of)    Disciplines:  Interdisciplinary Expected end:  03/27/21        Outcome: Progressing Towards Goal By Phil Penn RN on 03/26/21 9803            *Control of Pain (Pain)    Disciplines:  Nurse, Interdisciplinary, RT Expected end:  03/30/21        Outcome: Progressing Towards Goal By Phil Penn RN on 03/26/21 1509                         No Outcome (1)      Patient/Family Education (Patient Education: Go to Patient Education Activity)    Disciplines:  Interdisciplinary Expected end:  04/02/21                            ___________________    Care Team Notes          POC/IDG Notes      \[facility] [place] [facility]\"" IDG Nurse Notes by Nicholas Dong RN at 03/26/21 1447  Version 1 of 1    Author: Nicholas Dong RN Service: NURSING Author Type: Registered Nurse    Filed: 03/26/21 1447 Date of Service: 03/26/21 1447 Status: Signed    : Nicholas Dong RN (Registered Nurse)       Patient: Nichole Kincaid    Date: 03/26/21  Time: 2:47 PM    \[facility] [place] [facility]\"" Nurse Notes  Follow UP IDG: Pt is a 81-year-old male with metastatic NSCLC who is here routine level of care for management of pain, shortness of breath, agitation, dysphagia. Lakhani with UOP of 100ml   IV access. Left Subclavian Single Lumen   PO intake: NPO  Oxygen: Room air  Wounds: None  PRN medications: Ativan 1mg IV x 1 for anxiety / Morphine 4mg IV x 1 for dyspnea   Scheduled meds:  Duoneb  Plan: Add Fentanyl 25mcg Q 72 hours for pain.  Change to GIP on 3/26/2021. Comprehensive plan of care reviewed. IDG and pt./family in agreement with plan of care. The IDG identifies through on-going assessment when a change is needed to the POC; the pt/family will receive care and services necessitated by changes in POC. Medications reviewed by the pharmacist and Medical Director. Signed by: Merlene Estrella RN       [facility] IDG  Notes by Moshe Luna LMSW at 03/26/21 1226  Version 1 of 1    Author: Moshe Luna LMSW Service: Licensed Clinical  Author Type: Licensed Masters in Social Work    Filed: 03/26/21 1233 Date of Service: 03/26/21 1226 Status: Signed    : Moshe Luna LMSW (Licensed Masters in Social Work)       SW is following for needs and support. No change in current care plan. [facility] IDG  Notes by Leana Martinez at 03/26/21 1216  Version 1 of 1    Author: Leana Martinez Service: Spiritual Care Author Type: Pastoral Care    Filed: 03/26/21 1223 Date of Service: 03/26/21 1216 Status: Signed    : Leana Martinez (Pastoral Care)       Patient: Abdi Torre    Date: 03/26/21  Time: 12:16 PM    [facility]  Notes  Intervention: Ministry of presence, conversation with wife and her friend. Prayer with patient and family. Active listening. Words of encouragement. Outcome: Wife was able to voice concerns of how she will care for her  in the event he returns home. Plan: Continue to provide opportunity for open communication. Spiritual rituals as appropriate.        Signed by: Juan Malhotra       [facility] IDG Nurse Notes by Ilda Richard RN at 03/22/21 1455  Version 1 of 1    Author: Ilda Richard RN Service: NURSING Author Type: Registered Nurse    Filed: 03/22/21 1455 Date of Service: 03/22/21 1455 Status: Signed    : Ilda Richard RN (Registered Nurse)       Patient: Abdi Torre    Date: 03/22/21  Time: 2:55 PM    [facility] Nurse Notes  1st IDG: Pt is a 53-year-old male with Metastatic NSCLC who is here GIP level of care for management of dyspnea, troublesome secretions. Lakhani with UOP of 1050ml   IV access: Left Subclavian Venous Catheter   PO intake: 25%  Oxygen: 3/L  Wounds: Left Lower Arm ST  PRN medications: Robinul 0.2mg x 1 dose for secretions   Scheduled meds:  Decadron 4mg Q 12 / Senokot 8.6mg Q D  Plan: Add Duonebs breathing treatment TID. Comprehensive plan of care reviewed. IDG and pt./family in agreement with plan of care. The IDG identifies through on-going assessment when a change is needed to the POC; the pt/family will receive care and services necessitated by changes in POC. Medications reviewed by the pharmacist and Medical Director. Signed by: Merlene Estrella RN       [address] [place] [address]  Notes by Leana Martinez at 03/22/21 1232  Version 1 of 1    Author: Leana Martinez Service: Spiritual Care Author Type: Pastoral Care    Filed: 03/22/21 1233 Date of Service: 03/22/21 1232 Status: Signed    : Leana Martinez (Pastoral Care)       Patient: Abdi Torre    Date: 03/22/21  Time: 12:32 PM    [facility]  Notes    / Grief Counselor has reviewed  Initial Comprehensive Assessment and plan of care. Bereavement and Spiritual Care Assessments to be completed and plan of care put in place to meet the needs, requests and referrals. Signed by: Juan Area       [facility] IDG  Notes by Leana Martinez at 03/22/21 1122  Version 1 of 1    Author: Leana Martinez Service: Spiritual Care Author Type: Pastoral Care    Filed: 03/22/21 1123 Date of Service: 03/22/21 1122 Status: Signed    : Leana Martinez (Pastoral Care)       Patient: Abdi Pond    Date: 03/22/21  Time: 11:22 AM    [facility]  Notes    Assessments pending for spiritual and bereavement care.         Signed by: [address]  Notes by Leana Martinez at 03/22/21 1122  Version 1 of 1    Author: Leana Martinez Service: Spiritual Care Author Type: Pastoral Care    Filed: 03/22/21 1122 Date of Service: 03/22/21 1122 Status: Signed    : Haylie Jimenes (Pastoral Care)       Patient: Ryan Norris    Date: 03/22/21  Time: 11:22 AM    [facility]  Notes  / Grief Counselor has reviewed  Initial Comprehensive Assessment and plan of care. Bereavement and Spiritual Care Assessments to be completed and plan of care put in place to meet the needs, requests and referrals. Signed by: Toney OWENSLISA [facility] IDG  Notes by Adilia Clark LMSW at 03/22/21 1103  Version 1 of 1    Author: Adilia Clark LMSW Service: Licensed Clinical  Author Type:     Filed: 03/22/21 1104 Date of Service: 03/22/21 1103 Status: Signed    : Adilia Clark LMSW ()       SW has read the initial comprehensive assessment and plan of care and acknowledges no urgent needs and is in agreement with plan    SW to assess coping and needs each visit and offer availability. Care Team Present:   Team Members Present: Physician, Nurse, , , Bereavement, Other (Comment)  Physician Team Member: Dr. Ami Rodriguez  Nurse Team Member: Barbi Collins  Social Work Team Member: [address] Team Member: Kae Mendez  Other Discipline Present (Name):  Aj Quiroz NP

## 2021-03-26 NOTE — ADVANCED PRACTICE NURSE
NPO. Unable to take po meds. Has required Levsin x 1 SL for secretions, ativan 1mg IV x 1 for anxiety and morphine 4mg IV x 3 dyspnea. Case discussed in [facility] meeting today. Add fentanyl 25mcg patch for pain. Change to [facility] level of care for management of pain, dyspnea, dysphagia and agitation.

## 2021-03-26 NOTE — [facility] IDG NURSE NOTES
Patient: Franco Show    Date: 03/26/21  Time: 2:47 PM    [facility] Nurse Notes  Follow UP IDG: Pt is a 79-year-old male with metastatic NSCLC who is here routine level of care for management of pain, shortness of breath, agitation, dysphagia. Lakhani with UOP of 100ml   IV access. Left Subclavian Single Lumen   PO intake: NPO  Oxygen: Room air  Wounds: None  PRN medications: Ativan 1mg IV x 1 for anxiety / Morphine 4mg IV x 1 for dyspnea   Scheduled meds:  Duoneb  Plan: Add Fentanyl 25mcg Q 72 hours for pain. Change to GIP on 3/26/2021. Comprehensive plan of care reviewed. IDG and pt./family in agreement with plan of care. The IDG identifies through on-going assessment when a change is needed to the POC; the pt/family will receive care and services necessitated by changes in POC. Medications reviewed by the pharmacist and Medical Director.         Signed by: Kiki Lay RN

## 2021-03-26 NOTE — PROGRESS NOTES
9966- Report received, patient awake in bed, dyspneic at rest, wide eyed and appears afraid. asked if in pain, and patient did not respond. prn morphine given for dyspnea. Scheduled breathing tmt administered. Patient rr 30x/min. Call light within reach. Bed is low and locked, tab alert in place, and door left open for continuous monitoring. Pt expected discharge plan is to stay at [facility] until demise. 1448- prn ativan given for anxiety. Mouth care performed. Arms elevated on pillows. Patient skin warm to touch. Blankets pulled back to a sheet and room cooled down. Asked if patient was feeling better, and he nodded \"yes. \"    0800- patient now resting with eyes closed. rr 25 x/min, less dyspneic. 02 at 3L prn nc. Symptoms resolved. 1000- Patient resting with eyes closed. tachypnea at baseline. No s/sx of pain or distress. Will continue to monitor. 1123- pt spouse and friend at bedside. He awoke to their voices. Body started to shake and difficulty breathing noted. Prn morphine and haldol given for pain, dyspnea, and agitation. flacc 5/10.     1155- flacc 0/10. Respirations have eased. Patient dozing off. Symptoms have been controlled with prn medication. 1342- scheduled breathing tmt administered and 25 mcg fentanyl patch applied per orders. 1412- prn morphine given for dyspnea and prn haldol given for agitation. Patient has been changed to GIP status today. He is breathing with mouth open and his knees  Are mottled. He will open eyes and track minimally. Pt no longer answering questions. D/c plan still to stay at [facility] until demise. Patient placed in supine position. Gown changed and covers pulled back as patient is warm. Temps 99.0 ax Will continue to monitor. Wound care provided to L arm per orders. 1600- Patient resting with eyes closed. No s/sx of pain or distress. 1803- Patient resting with eyes closed. No s/sx of pain or distress.     1900- report given to Kate Edwards [place]

## 2021-03-26 NOTE — PROGRESS NOTES
Problem: Falls - Risk of  Goal: *Absence of Falls  Description: patient will not have any falls or injuries during shift   3/26/2021 1459 by Tom Newman  Outcome: Progressing Towards Goal  Note: Fall Risk Interventions:       Mentation Interventions: Adequate sleep, hydration, pain control, Bed/chair exit alarm, Door open when patient unattended, Evaluate medications/consider consulting pharmacy    Medication Interventions: Bed/chair exit alarm, Evaluate medications/consider consulting pharmacy    Elimination Interventions: Bed/chair exit alarm, Toileting schedule/hourly rounds       Problem: Pressure Injury - Risk of  Goal: *Prevention of pressure injury  Description: patient will not develop any pressure injuries during stay. 3/26/2021 1459 by Tom Newman  Outcome: Progressing Towards Goal  Note: Pressure Injury Interventions:  Sensory Interventions: Assess changes in LOC, Avoid rigorous massage over bony prominences, Check visual cues for pain, Float heels, Pad between skin to skin, Pressure redistribution bed/mattress (bed type)    Moisture Interventions: Absorbent underpads, Apply protective barrier, creams and emollients, Internal/External urinary devices, Moisture barrier    Activity Interventions: Pressure redistribution bed/mattress(bed type)    Mobility Interventions: Float heels, Pressure redistribution bed/mattress (bed type)    Nutrition Interventions: Document food/fluid/supplement intake    Friction and Shear Interventions: Apply protective barrier, creams and emollients, Lift team/patient mobility team         Problem: Pain  Goal: patient's pain will be controlled during shift aeb flacc of 2 or less during shift. Scheduled fentanyl patch 25 mcg q 72 hrs.    Morphine 4 mg iv q 20 min prn  3/26/2021 1459 by Tom Newman  Outcome: Progressing Towards Goal       Problem: Anxiety/Agitation  Goal: Verbalize and demonstrate ability to manage anxiety  Description: the patient's anxiety/ agitation will be controlled during shift aeb relaxed body and facial features.    Haldol 2 mg iv q 1 hr prn  Ativan 1 mg  iv q 4 prn   Outcome: Progressing Towards Goal

## 2021-03-27 NOTE — PROGRESS NOTES
Problem: Falls - Risk of  Goal: *Absence of Falls  Description: patient will not have any falls or injuries during shift   3/26/2021 1459 by Freda Hammans  Outcome: Progressing Towards Goal  Note: Fall Risk Interventions:       Mentation Interventions: Adequate sleep, hydration, pain control, Bed/chair exit alarm, Door open when patient unattended, Evaluate medications/consider consulting pharmacy    Medication Interventions: Bed/chair exit alarm, Evaluate medications/consider consulting pharmacy    Elimination Interventions: Bed/chair exit alarm, Toileting schedule/hourly rounds       Problem: Pressure Injury - Risk of  Goal: *Prevention of pressure injury  Description: patient will not develop any pressure injuries during stay. 3/26/2021 1459 by Freda Hammans  Outcome: Progressing Towards Goal  Note: Pressure Injury Interventions:  Sensory Interventions: Assess changes in LOC, Avoid rigorous massage over bony prominences, Check visual cues for pain, Float heels, Pad between skin to skin, Pressure redistribution bed/mattress (bed type)    Moisture Interventions: Absorbent underpads, Apply protective barrier, creams and emollients, Internal/External urinary devices, Moisture barrier    Activity Interventions: Pressure redistribution bed/mattress(bed type)    Mobility Interventions: Float heels, Pressure redistribution bed/mattress (bed type)    Nutrition Interventions: Document food/fluid/supplement intake    Friction and Shear Interventions: Apply protective barrier, creams and emollients, Lift team/patient mobility team         Problem: Pain  Goal: patient's pain will be controlled during shift aeb flacc of 2 or less during shift. Scheduled fentanyl patch 25 mcg q 72 hrs.    Morphine 4 mg iv q 20 min prn  3/26/2021 1459 by Freda Hammans  Outcome: Progressing Towards Goal       Problem: Anxiety/Agitation  Goal: Verbalize and demonstrate ability to manage anxiety  Description: the patient's anxiety/ agitation will be controlled during shift aeb relaxed body and facial features.    Haldol 2 mg iv q 1 hr prn  Ativan 1 mg  iv q 4 prn   Outcome: Progressing Towards Goal

## 2021-03-27 NOTE — PROGRESS NOTES
Problem: Dyspnea Due to End of Life  Goal: pt Respiratory symptoms will be controlled during shift aeb RR less than 30 and minimal use of accessory muscles.      Medications:  Morphine 4 mg iv q 20 min prn  Outcome: Progressing Towards Goal

## 2021-03-27 NOTE — PROGRESS NOTES
1845:  Patient report from Denise Tabor RN. Patient ID by name and . Patient in bed with eyes closed. No s/sx of pain, dyspnea, or agitation observed. FLACC 0/10. Bed low and locked and all safety measures in place. Door open. :  Port flushed as ordered. Physical assessment complete. Patient with no response at this time. No s/sx of pain, dyspnea, or agitation observed. FLACC 0/10.    2235:  Patient premedicated for bath with PRN Morphine 4 mg IV and PRN Haldol 2 mg IV.    0030:  Patient resting with no s/sx of pain, dyspnea, or agitation observed. FLACC 0/10.    0300:  Patient resting with no s/sx of pain, dyspnea, or agitation observed. FLACC 0/10.    0500:  Patient resting with no s/sx of pain, dyspnea, or agitation observed. FLACC 0/10. Patient has required PRN Morphine x 1 and PRN Haldol x 1 this shift for symptom management. Report to Denise Tabor RN.

## 2021-03-27 NOTE — PROGRESS NOTES
Problem: Falls - Risk of  Goal: *Absence of Falls  Description: Document Igor Yoselyn Fall Risk and appropriate interventions in the flowsheet. Outcome: Progressing Towards Goal  Note: Fall Risk Interventions:       Mentation Interventions: Adequate sleep, hydration, pain control, Bed/chair exit alarm, Door open when patient unattended, Evaluate medications/consider consulting pharmacy    Medication Interventions: Bed/chair exit alarm, Evaluate medications/consider consulting pharmacy    Elimination Interventions: Bed/chair exit alarm, Toileting schedule/hourly rounds              Problem: Pressure Injury - Risk of  Goal: *Prevention of pressure injury  Description: Document Antelmo Scale and appropriate interventions in the flowsheet. Outcome: Progressing Towards Goal  Note: Pressure Injury Interventions:  Sensory Interventions: Assess changes in LOC, Avoid rigorous massage over bony prominences, Check visual cues for pain, Float heels, Pad between skin to skin, Pressure redistribution bed/mattress (bed type)    Moisture Interventions: Absorbent underpads, Apply protective barrier, creams and emollients, Internal/External urinary devices, Moisture barrier    Activity Interventions: Pressure redistribution bed/mattress(bed type)    Mobility Interventions: Float heels, Pressure redistribution bed/mattress (bed type)    Nutrition Interventions: Document food/fluid/supplement intake    Friction and Shear Interventions: Apply protective barrier, creams and emollients, Lift team/patient mobility team                Problem: Pain  Goal: *Control of Pain  Outcome: Progressing Towards Goal     Problem: Anxiety/Agitation  Goal: Verbalize and demonstrate ability to manage anxiety  Description: The patient/family/caregiver will verbalize and demonstrate ability to manage the patient's anxiety throughout hospice care.   Outcome: Progressing Towards Goal

## 2021-03-27 NOTE — PROGRESS NOTES
4358- Report received, patient resting quietly in bed. Fentanyl patch intact. No s/sx of pain. Dyspnea noted. Prn morphine given for dyspnea and scheduled breathing tmt administered. Call light within reach. Bed is low and locked, tab alert in place, and door left open for continuous monitoring. Expected D/c plan is for patient to pass at [facility]- Dyspnea at rest remains, but patient appears comfortable despite breathing. Lung sounds course. 02 at 3L prn. Patient opens eyes spontaneously and does track, but he does not respond. Knees are mottled and mouth stays open. All extremities edematous, feet much less swollen than yesterday. 1030- Patient resting with eyes closed. No s/sx of pain or distress. 1149- patient turned to L side and prn morphine given for increased dyspnea/labored breathing and prn haldol given for agitation. 1214- patient reassessed. Breathing with much more ease at this time and appears relaxed. Symptoms resolved. 1349- scheduled breathing tmt administered. 1403- prn morphine given for labored breathing. 1410- breathing has settled. Patient appears comfortable. 0- family came to get RN. Per family patient has been fidgeting with abdomen and moving hands and breathing has become more labored. Prn morphine and ativan given for dyspnea and agitation. 1555- Patient resting with eyes closed. No s/sx of pain or distress. Symptoms resolved. 1726- Patient resting with eyes closed. No s/sx of pain or distress. [id number]- Patient resting with eyes closed. No s/sx of pain or distress.   Report given to Rosalie Teague

## 2021-03-28 NOTE — PROGRESS NOTES
Problem: Falls - Risk of  Goal: *Absence of Falls  Description: Document Clydene Bone Fall Risk and appropriate interventions in the flowsheet. Outcome: Progressing Towards Goal  Note: Fall Risk Interventions:       Mentation Interventions: Bed/chair exit alarm, Door open when patient unattended, Reorient patient    Medication Interventions: Evaluate medications/consider consulting pharmacy, Bed/chair exit alarm    Elimination Interventions: Call light in reach, Bed/chair exit alarm              Problem: Pressure Injury - Risk of  Goal: *Prevention of pressure injury  Description: Document Antelmo Scale and appropriate interventions in the flowsheet. Outcome: Progressing Towards Goal  Note: Pressure Injury Interventions:  Sensory Interventions: Assess changes in LOC, Float heels, Minimize linen layers, Keep linens dry and wrinkle-free, Check visual cues for pain, Pad between skin to skin    Moisture Interventions: Absorbent underpads, Maintain skin hydration (lotion/cream), Minimize layers, Internal/External urinary devices, Apply protective barrier, creams and emollients, Limit adult briefs    Activity Interventions: Pressure redistribution bed/mattress(bed type)    Mobility Interventions: Pressure redistribution bed/mattress (bed type), Float heels    Nutrition Interventions: Document food/fluid/supplement intake    Friction and Shear Interventions: Apply protective barrier, creams and emollients, Minimize layers, Lift sheet                Problem: Pain  Goal: *Control of Pain  Outcome: Progressing Towards Goal     Problem: Anxiety/Agitation  Goal: Verbalize and demonstrate ability to manage anxiety  Description: The patient/family/caregiver will verbalize and demonstrate ability to manage the patient's anxiety throughout hospice care.   Outcome: Progressing Towards Goal     Problem: Dyspnea Due to End of Life  Goal: Demonstrate understanding of and ability to manage respiratory symptoms at end of life  Outcome: Progressing Towards Goal

## 2021-03-28 NOTE — PROGRESS NOTES
Problem: Falls - Risk of  Goal: *Absence of Falls  Description: patient will not have any falls or injuries during shift   3/26/2021 1459 by Theo Bowden  Outcome: Progressing Towards Goal  Note: Fall Risk Interventions:       Mentation Interventions: Adequate sleep, hydration, pain control, Bed/chair exit alarm, Door open when patient unattended, Evaluate medications/consider consulting pharmacy    Medication Interventions: Bed/chair exit alarm, Evaluate medications/consider consulting pharmacy    Elimination Interventions: Bed/chair exit alarm, Toileting schedule/hourly rounds       Problem: Pressure Injury - Risk of  Goal: *Prevention of pressure injury  Description: patient will not develop any pressure injuries during stay. 3/26/2021 1459 by Theo Bowden  Outcome: Progressing Towards Goal  Note: Pressure Injury Interventions:  Sensory Interventions: Assess changes in LOC, Avoid rigorous massage over bony prominences, Check visual cues for pain, Float heels, Pad between skin to skin, Pressure redistribution bed/mattress (bed type)    Moisture Interventions: Absorbent underpads, Apply protective barrier, creams and emollients, Internal/External urinary devices, Moisture barrier    Activity Interventions: Pressure redistribution bed/mattress(bed type)    Mobility Interventions: Float heels, Pressure redistribution bed/mattress (bed type)    Nutrition Interventions: Document food/fluid/supplement intake    Friction and Shear Interventions: Apply protective barrier, creams and emollients, Lift team/patient mobility team         Problem: Pain  Goal: patient's pain will be controlled during shift aeb flacc of 2 or less during shift. Scheduled fentanyl patch 25 mcg q 72 hrs.    Morphine 4 mg iv q 20 min prn  3/26/2021 1459 by Theo Bowden  Outcome: Progressing Towards Goal       Problem: Anxiety/Agitation  Goal: Verbalize and demonstrate ability to manage anxiety  Description: the patient's anxiety/ agitation will be controlled during shift aeb relaxed body and facial features. Haldol 2 mg iv q 1 hr prn  Ativan 1 mg  iv q 4 prn   Outcome: Progressing Towards Goal    Problem: Dyspnea Due to End of Life  Goal: pt Respiratory symptoms will be controlled during shift aeb RR less than 30 and minimal use of accessory muscles.      Medications:  Scheduled duonebs q 8 hrs and q 4 hrs prn  Morphine 4 mg iv q 20 min prn  Outcome: Progressing Towards Goal

## 2021-03-28 NOTE — PROGRESS NOTES
203 [facility] report from Hui Daniels RN. Pt Id by name and  during rounds.   Pt lying in bed with eyes closed. Non interactive. No facial grimace. Flacc =0-1. Resp shallow irreg on 3 L n/c. Lungs coarse. HR irreg. BS hypo. Fentanyl 25 mcg/hr patch intact on right arm and dated 3/26/21. Lakhani cath draining clear rodrigo urine. Skin cool and dry. Mottled on extremities. SR up x 2. Bed low/locked. Call light with in reach. Door opened.   Scheduled neb tx given. Port flushed with 10 cc ns followed by 3 ml heparin 100 units/ml. Flushed well. Good blood return noted. Dressing clean/dry/intact. Pt tolerated well.       Pt with eyes closed. No facial grimace. Flacc =0-1. Resp irreg non labored on 3 L n/c. SR up x 2. Bed low/locked. Call light with in reach. Door opened. 0121  Pt with eyes closed. Non interactive. No facial grimace. Flacc =0-1. Resp non labored on 3 L n/c. SR up x 2. Bed low/locked. Call light with in reach. Door opened. 0315  Pt with eyes closed. No facial grimace. Flacc =0-1. Resp irreg non labored on 3 L n/c. SR up x 2. Bed low/locked. Call light with in reach. Door opened. 0525  Pt resting comfortably with eyes closed. No facial grimace. Flacc =0-1. Resp irreg non labored on 3 L n/c. Scheduled neb tx given. Pt tolerated well. SR up x 2. Bed low/locked. Call light with in reach. Door opened. Report given to Hui Daniels RN.

## 2021-03-29 NOTE — PROGRESS NOTES
3145: Report received from off going RN. 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 Pt was changed to routine level of care on 3/23/21 and then changed back to GIP on 3/26/21 for management of secretions, anxiety and dyspnea. Fentanyl 25mcg/hr pacth observed to right upper arm. Presently he is resting with eyes closed, resps regular and non-labored. No s/sx of pain or other discomfort at this time. FLACC score is 0/10.     4724: Physical assessment complete. No s/sx of pain or other discomfort observed. FLACC score 0/10.     1049: Pt with gurgling and tachypnea, resps 32/min, facial grimacing observed. Glycopyrrolate 0.2mg IVP and Morphine 4mg IVP administered to promote comfort. Drsg and needle changed to left chest port at this time. FLACC score 6/10.     1130: Pt now resting with eyes closed, resps now 28/min, non-labored. No further facial grimacing. Pt does not track with eyes, eyes are glazed and fixed. FLACC score now 0/10.     1410: Family in room. Spouse given update on patient's day. New order to increase Fentanyl patch received. Fentanyl 37mcg/hr applied to right upper chest after removal of 25mcg/hr patch on right upper arm. Pt with eyes open in bed, but no verbal response and does not track. FLACC score at this time 0/10.     1636: Hydromorphone 1mg IVP administered for dyspnea. Pt with eyes closed however resps labored at this time. Spouse and friend in room visiting. thankful for the care but no other voiced requests at this time. 1710: Pt resting with eyes closed, resps shallow, non-labored. No s/sx of pain or dyspnea observed. FLACC score 0/10. No family present at this time. 1845: Report given to oncoming RN.

## 2021-03-29 NOTE — [facility] IDG NURSE NOTES
Patient: Abdi Torre    Date: 03/29/21  Time: 1:37 PM    [facility] Nurse Notes  Follow UP IDG: Pt is a 59-year-old male with metastatic NSCLC who is here routine level of care for management of pain, shortness of breath, agitation, dysphagia. Lakhani with UOP of 250ml   IV access. Left Subclavian Single Lumen   PO intake: NPO  Oxygen: 3/L  Wounds: None  PRN medications Robinul 0.2mg x 1 for secretions / Ativan x 1 dose for agitation/ Morphine 4mg x 2 doses for pain   Scheduled meds:  Duoneb / Fentanyl 25mcg patch Q 72 hours for pain  Plan: Increase Fentanyl to 37 mcg Q 72 hours for pain / D/C Morphine. Add Dilaudid 1mg Q 30 min PRN. Comprehensive plan of care reviewed. IDG and pt./family in agreement with plan of care. The IDG identifies through on-going assessment when a change is needed to the POC; the pt/family will receive care and services necessitated by changes in POC. Medications reviewed by the pharmacist and Medical Director.         Signed by: Merlene Estrella RN

## 2021-03-29 NOTE — PROGRESS NOTES
Problem: Falls - Risk of  Goal: *Absence of Falls  Description: Document Artur Led Fall Risk and appropriate interventions in the flowsheet. Outcome: Progressing Towards Goal  Note: Fall Risk Interventions:       Mentation Interventions: Adequate sleep, hydration, pain control, Bed/chair exit alarm, Familiar objects from home    Medication Interventions: Bed/chair exit alarm    Elimination Interventions: Bed/chair exit alarm, Call light in reach              Problem: Pressure Injury - Risk of  Goal: *Prevention of pressure injury  Description: Document Antelmo Scale and appropriate interventions in the flowsheet. Outcome: Progressing Towards Goal  Note: Pressure Injury Interventions:  Sensory Interventions: Assess changes in LOC, Assess need for specialty bed, Float heels, Maintain/enhance activity level, Minimize linen layers    Moisture Interventions: Absorbent underpads, Apply protective barrier, creams and emollients, Limit adult briefs, Moisture barrier, Minimize layers    Activity Interventions: Assess need for specialty bed    Mobility Interventions: Assess need for specialty bed, Pressure redistribution bed/mattress (bed type)    Nutrition Interventions: Document food/fluid/supplement intake, Discuss nutritional consult with provider, Offer support with meals,snacks and hydration    Friction and Shear Interventions: Apply protective barrier, creams and emollients, Lift sheet, Minimize layers                Problem: Patient Education: Go to Patient Education Activity  Goal: Patient/Family Education  Outcome: Progressing Towards Goal     Problem: Pain  Goal: *Control of Pain  Outcome: Progressing Towards Goal     Problem: Anxiety/Agitation  Goal: Verbalize and demonstrate ability to manage anxiety  Description: The patient/family/caregiver will verbalize and demonstrate ability to manage the patient's anxiety throughout hospice care.   Outcome: Progressing Towards Goal

## 2021-03-29 NOTE — [facility] IDG CHAPLAIN NOTES
Patient: Zully Cuevas    Date: 03/29/21  Time: 9:54 AM    [facility]  Notes  Intervention: Ministry of presence, meaningful conversation with patient's wife and friend, prayer, coordination with  on paperwork, joint visit with Carlos Woodson, to talk about patient's change in level of care. Outcome: Wife appears to be accepting of her 's condition. During visit with House Supervisor wife was able to verbalize her understanding. Open and appreciative for spiritual support. Plan: Continue to provide spiritual and emotional support.          Signed by: Abby Espinal

## 2021-03-29 NOTE — PROGRESS NOTES
Problem: Anticipatory Grief  Goal: Grief heard and acknowledged, anxiety reduced, patient coping identified, patient/family expressed gratitude  Description: Patient/ Family will acknowledge feelings of grief and anxiety and utilize available resources to assist their grief journey.    Outcome: Progressing Towards Goal

## 2021-03-29 NOTE — PROGRESS NOTES
1900-Report received from off-going nurse,Marybeth Tucker visual identification made, assumed care of pt. Pt resting quietly with eyes closed, no agitation or restlessness, no grimacing or groaning. Pt respirations unlabored. Tab alert in place, rails up x 2, bed in lowest position, safety maintained. FLACC 0. Fentanyl patch on right arm. No change to discharge plan pt to stay at [facility] until discharged. 2100- Patient resting with eyes closed. No s/sx of pain or distress. 2252- Scheduled breathing treatment administered. 0000- Patient resting with eyes closed. No s/sx of pain or distress. 0200- Patient resting with eyes closed. No s/sx of pain or distress. 0400- Patient resting with eyes closed. No s/sx of pain or distress. 0600- Patient resting with eyes closed. No s/sx of pain or distress.     Reported off to oncoming RN

## 2021-03-29 NOTE — [facility] IDG MASTER NOTE
[facility]  Date: 03/29/21  Time: 1:37 PM    ___________________    Patient: Mau Murphy  Coverage Information:     Payor: [place]ARE     Plan: [place] MEDICARE PART A AND B     Subscriber ID: [id number]     Phone Number:   MRN: [id number]    Current Benefit Period: Benefit Period 1  Start Date: 3/19/2021  End Date: 6/16/2021    Hospice Attending Provider: Mey Buckner [address] [place] [address]  [address]  Phone: [phone]  Fax: [phone]    Level of Care: General Inpatient Care      ___________________    Diagnoses: There were no encounter diagnoses.     Current Medications:    Current Facility-Administered Medications:     HYDROmorphone (PF) (DILAUDID) injection 1 mg, 1 mg, SubCUTAneous, Q30MIN PRN **OR** HYDROmorphone (PF) (DILAUDID) injection 1 mg, 1 mg, IntraVENous, Q30MIN PRN, Rach Bell NP    fentaNYL (DURAGESIC) 25 mcg/hr patch 1 Patch, 1 Patch, TransDERmal, Q72H **AND** fentaNYL (DURAGESIC) 12 mcg/hr patch 1 Patch, 1 Patch, TransDERmal, Q72H, Sergio Wiggins NP    glycopyrrolate (ROBINUL) injection 0.2 mg, 0.2 mg, IntraVENous, Q4H PRN, Rach Bell NP, 0.2 mg at 03/29/21 1049    sodium chloride (NS) flush 10 mL, 10 mL, IntraVENous, PRN, Burnice Holter, MD, 10 mL at 03/28/21 0917    sodium chloride (NS) flush 10 mL, 10 mL, IntraVENous, Q12H, Burnice Holter, MD, 10 mL at 03/29/21 1048    heparin (porcine) pf 300 Units, 300 Units, InterCATHeter, Q12H, Burnice Holter, MD, 300 Units at 03/29/21 1048    acetaminophen (TYLENOL) suppository 650 mg, 650 mg, Rectal, Q3H PRN, Rach Bell NP    bisacodyL (DULCOLAX) suppository 10 mg, 10 mg, Rectal, PRN, Rach Bell NP    albuterol-ipratropium (DUO-NEB) 2.5 MG-0.5 MG/3 ML, 3 mL, Nebulization, Q8H, Rach Bell NP, 3 mL at 03/29/21 0605    albuterol-ipratropium (DUO-NEB) 2.5 MG-0.5 MG/3 ML, 3 mL, Nebulization, Q4H PRN, Rach Bell NP    LORazepam (ATIVAN) injection 1 mg, 1 mg, IntraVENous, Q2H PRN, Nette Melgoza MD, 1 mg at 03/28/21 0680    haloperidol lactate (HALDOL) injection 2 mg, 2 mg, SubCUTAneous, Q1H PRN **OR** haloperidol lactate (HALDOL) injection 2 mg, 2 mg, IntraVENous, Q1H PRN, Nette Melgoza MD, 2 mg at 03/27/21 1149    LORazepam (ATIVAN) injection 2 mg, 2 mg, IntraVENous, Q10MIN PRN, Nette Melgoza MD    Orders:  Orders Placed This Encounter    IP CONSULT TO SPIRITUAL CARE     Standing Status:   Standing     Number of Occurrences:   1     Order Specific Question:   Reason for Consult: Answer: Once on week one, then PRN. For [facility] Patients Only. For contracted patients, their primary hospice will continue to manage spiritual care needs.  DIET PLEASURE 3 Honey/3 Moderately Thick     Standing Status:   Standing     Number of Occurrences:   1     Order Specific Question:   Likes/Dislikes/Preferences     Answer:   please hold tray, nursing will call if tray needed. Order Specific Question:   Consistency/Thickener:     Answer:   3 Honey/3 Moderately Thick    VITAL SIGNS     Standing Status:   Standing     Number of Occurrences:   [id number]    NURSING-MISCELLANEOUS: Comfort Care Measures CONTINUOUS     Standing Status:   Standing     Number of Occurrences:   1     Order Specific Question:   Description of Order:     Answer:   Comfort Care Measures    BLADDER CHECKS     May scan bladder PRN for urinary retention and or patient discomfort     Standing Status:   Standing     Number of Occurrences:   03145    PAIN ASSESSMENT Pain and Symptoms: Assess ever 4 hours and PRN, for GIP level of care. PRN Routine     Standing Status:   Standing     Number of Occurrences:   13202     Order Specific Question:   Please describe the test or procedure you would like to order. Answer:   Pain and Symptoms: Assess ever 4 hours and PRN, for GIP level of care.     BEDREST, COMPLETE     Standing Status:   Standing     Number of Occurrences:   1    VITAL SIGNS PER UNIT ROUTINE     Standing Status:   Standing     Number of Occurrences:   1    BRADLEY CATHETER, CARE     1. Bradley Catheter care every shift and PRN  2. Notify Physician of Bradley Catheter leakage, occlusion, gross adherent sediment or accidental removal  3. Change Bradley 30 days after insertion. 4. May flush catheter bid and prn leakage or gross adherent sediment or mucus. Standing Status:   Standing     Number of Occurrences:   1    NURSING ASSESSMENT:  SPECIFY Assess for GIP, routine or respite level of care Q SHIFT Routine     Standing Status:   Standing     Number of Occurrences:   1     Order Specific Question:   Please describe the test or procedure you would like to order. Answer:   Assess for GIP, routine or respite level of care    NURSING-MISCELLANEOUS: admit 3/19: (SFE) Admitted GIP with metastatic lung cancer for management of pain, dyspnea and agitation. DIAGNOSIS: Metastatic Lung Cancer (acute hypoxic respiratory failure, multiple intrathoracic metastases, malignant int. .. 3/19: ([facility]) Admitted GIP with metastatic lung cancer for management of pain, dyspnea and agitation. DIAGNOSIS: Metastatic Lung Cancer (acute hypoxic respiratory failure, multiple intrathoracic metastases, malignant intrathoracic lymphadenopathy, spinal metastases, aspiration pneumonia, chronic ethanol abuse)      SUMMARY: Patient is a 68 y.o. male with PMH of including but not limited to stroke, HLD, HTN, hx of stage 4 lung cancer, thyroid disease, who is seen in consultation at the request of Dr. Ya Ibrahim  for GIB. Pt recently admitted for suspected CVA. He was discharged 3/9/2021. EMS called to rehab facility 3/12 for respiratory distress. He was intubated and brought to ED. Labs with elevated WBC, KEI, hypernatremia. CXR with hazy infiltrate left base. He was started on antibiotics. He was admitted to ICU and started on sedation. He has been made DNR. He was extubated 3/14/21. Currently NPO. Remains on Zosyn. HGB 9.4 (down from 12.1 at discharge 3/9/2021 and 10.9 at admission 3/12/2021) normocytic. PLT 80. BUN 52, Cr 1.19. Underwent swallowing study today. No overt bleeding noted. He is receiving pantoprazole BID. Heparin has been held. The patient is a swallowing study was positive and he continues aspirating. The family desires no feeding tube. It is not felt that further invasive diagnostic or therapeutic interventions would be of benefit. The patient is a DNR. CERTIFICATION: The patient is appropriate for hospice level care. Death is likely in the next 6 weeks. Standing Status:   Standing     Number of Occurrences:   1     Order Specific Question:   Description of Order:     Answer:   admit    DRESSING CHANGE - PICC, [address], SUBCUTANEOUS AND ACCESSED PORT DRESSING CHANGE     PICC, [address], SUBCUTANEOUS AND ACCESSED PORT DRESSING CHANGE:  Change catheter site dressing every 5 days and prn if site dressing becomes damp loosened or visibly soiled       Standing Status:   Standing     Number of Occurrences:   [id number]    NURSING-MISCELLANEOUS: Level of care change: 3/23: Change to routine level of care 3/26: Change to [facility] level of care for management of pain, dyspnea, dysphagia and agitation. CONTINUOUS     3/23: Change to routine level of care  3/26: Change to [facility] level of care for management of pain, dyspnea, dysphagia and agitation. Standing Status:   Standing     Number of Occurrences:   1     Order Specific Question:   Description of Order:     Answer:   Level of care change:    WOUND CARE, DRESSING CHANGE     Wound Care:  Location: left arm  Skin Tear: Cleanse skin with wound cleanser. Cover with non stick dressing and secure with kerlix wrap. Change every 3 days. Assess every shift and PRN. Standing Status:   Standing     Number of Occurrences:   5    DO NOT RESUSCITATE     Standing Status:   Standing     Number of Occurrences:   1     Order Specific Question:   Comfort Measures Only?      Answer: Yes    OXYGEN CANNULA Liters per minute: 3; Indications for O2 therapy: HYPOXIA PRN Routine     Standing Status:   Standing     Number of Occurrences:   04049     Order Specific Question:   Liters per minute:      Answer:   3     Order Specific Question:   Indications for O2 therapy     Answer:   HYPOXIA    DISCONTD: sodium chloride (NS) flush 3 mL    DISCONTD: morphine (ROXANOL) concentrated oral syringe 10 mg    DISCONTD: morphine (ROXANOL) concentrated oral syringe 10 mg    DISCONTD: morphine injection 4 mg    DISCONTD: morphine injection 4 mg    DISCONTD: acetaminophen (TYLENOL) tablet 650 mg    LORazepam (ATIVAN) injection 1 mg    DISCONTD: loperamide (IMODIUM) capsule 4 mg    DISCONTD: senna (SENOKOT) tablet 8.6 mg    OR Linked Order Group     haloperidol lactate (HALDOL) injection 2 mg     haloperidol lactate (HALDOL) injection 2 mg    DISCONTD: hyoscyamine SL (LEVSIN/SL) tablet 0.125 mg    DISCONTD: glycopyrrolate (ROBINUL) injection 0.2 mg    LORazepam (ATIVAN) injection 2 mg    DISCONTD: dexamethasone (DECADRON) 4 mg/mL injection 4 mg    DISCONTD: sodium chloride (NS) flush 3 mL    albuterol-ipratropium (DUO-NEB) 2.5 MG-0.5 MG/3 ML     Order Specific Question:   MODE OF DELIVERY     Answer:   Nebulizer    albuterol-ipratropium (DUO-NEB) 2.5 MG-0.5 MG/3 ML     Order Specific Question:   MODE OF DELIVERY     Answer:   Nebulizer    dexamethasone (DECADRON) 4 mg/mL injection 4 mg    DISCONTD: polyethylene glycol (MIRALAX) packet 17 g    acetaminophen (TYLENOL) suppository 650 mg    bisacodyL (DULCOLAX) suppository 10 mg    sodium chloride (NS) flush 10 mL    sodium chloride (NS) flush 10 mL    DISCONTD: heparin (porcine) pf 300 Units    heparin (porcine) pf 300 Units    DISCONTD: LORazepam (ATIVAN) tablet 1 mg    DISCONTD: fentaNYL (DURAGESIC) 25 mcg/hr patch 1 Patch    glycopyrrolate (ROBINUL) injection 0.2 mg    DISCONTD: fentaNYL (DURAGESIC) 25 mcg/hr patch 1 Patch    OR Linked Order Group     HYDROmorphone (PF) (DILAUDID) injection 1 mg     HYDROmorphone (PF) (DILAUDID) injection 1 mg    AND Linked Order Group     fentaNYL (DURAGESIC) 25 mcg/hr patch 1 Patch     fentaNYL (DURAGESIC) 12 mcg/hr patch 1 Patch    INITIAL PHYSICIAN ORDER: HOSPICE Level Of Care: General Inpatient; Reason for Admission: Metastatic LUNG Cancer, pulmonary aspiration for respiratory and pain management. Standing Status:   Standing     Number of Occurrences:   1     Order Specific Question:   Status     Answer:   Hospice     Order Specific Question:   Level Of Care     Answer:   General Inpatient     Order Specific Question:   Reason for Admission     Answer:   Metastatic LUNG Cancer, pulmonary aspiration for respiratory and pain management. Order Specific Question:   Inpatient Hospitalization Certified Necessary for the Following Reasons     Answer:   3. Patient receiving treatment that can only be provided in an inpatient setting (further clarification in H&P documentation)     Order Specific Question:   Admitting Diagnosis     Answer:   Metastatic lung cancer (metastasis from lung to other site) [facility]) [[id number]]     Order Specific Question:   Terminal Prognosis Diagnosis(es)     Answer:   Metastatic lung cancer (metastasis from lung to other site) [facility]) [[id number]]     Order Specific Question:   Admitting Physician     Answer:   Jessica Calzada     Order Specific Question:   Attending Physician     Answer:   Jamil Briggs [[id number]]    IP CONSULT TO SOCIAL WORK     Standing Status:   Standing     Number of Occurrences:   1     Order Specific Question:   Reason for Consult: Answer: For [facility] Patients Only. For contracted patients, their primary hospice will continue to manage social work needs.        Allergies:  No Known Allergies    Care Plan:  Multidisciplinary Problems (Active)     Problem: Anticipatory Grief     Dates: Start: 03/29/21       Disciplines: Interdisciplinary Goal: Grief heard and acknowledged, anxiety reduced, patient coping identified, patient/family expressed gratitude     Dates: Start: 03/29/21   Expected End: 04/09/21       Description: Patient/ Family will acknowledge feelings of grief and anxiety and utilize available resources to assist their grief journey. Disciplines: Interdisciplinary    Intervention: Assess grief responses     Dates: Start: 03/29/21       Description: Antoinette Montgomery will assess grief reactions with each visit. Intervention: Support grieving process     Dates: Start: 03/29/21       Description: Antoinette Montgomery will provide opportunities of open, non judgmental communication. Problem: Anxiety/Agitation     Dates: Start: 03/26/21       Disciplines: Interdisciplinary    Goal: Verbalize and demonstrate ability to manage anxiety     Dates: Start: 03/26/21   Expected End: 03/30/21       Description: The patient/family/caregiver will verbalize and demonstrate ability to manage the patient's anxiety throughout hospice care. Disciplines: Interdisciplinary    Intervention: Assess for anxiety/agitation     Dates: Start: 03/26/21       Description: Assess for signs and symptoms of anxiety and agitation. Intervention: Instruction strategies to reduce anxiety/agitation     Dates: Start: 03/26/21       Description: Instruct patient/caregiver on strategies to reduce anxiety/agitation.                       Problem: Dyspnea Due to End of Life     Dates: Start: 03/27/21       Disciplines: Interdisciplinary    Goal: Demonstrate understanding of and ability to manage respiratory symptoms at end of life     Dates: Start: 03/27/21   Expected End: 03/29/21       Disciplines: Interdisciplinary    Intervention: Assess for signs and symptoms of dyspnea     Dates: Start: 03/27/21             Intervention: Instruct on causes/symptoms of dyspnea     Dates: Start: 03/27/21             Intervention: Instruct patient/caregiver/family on strategies to effectively manage dyspnea     Dates: Start: 03/27/21                         Problem: Falls - Risk of     Dates: Start: 03/19/21       Disciplines: Interdisciplinary    Goal: *Absence of Falls     Dates: Start: 03/19/21   Expected End: 04/03/21       Description: Document Joslyn Morales Fall Risk and appropriate interventions in the flowsheet.     Disciplines: Interdisciplinary                Problem: Pain     Dates: Start: 03/23/21       Disciplines: Nurse, Interdisciplinary, RT    Goal: *Control of Pain     Dates: Start: 03/23/21   Expected End: 03/30/21       Disciplines: Nurse, Interdisciplinary, RT    Intervention: Assess pain characteristics (eg: Intensity scale; onset; location; quality; severity; duration; frequency; radiation)     Dates: Start: 03/23/21             Intervention: Assess pain management - barriers (eg: Past pain experiences)     Dates: Start: 03/23/21             Intervention: Identify pain expectations (eg: Patient's pain goal; somatic experiences; behavioral changes; affect)     Dates: Start: 03/23/21             Intervention: Support system identification (eg: Caregiver; community resource; family; friends; Yazidism; support group)     Dates: Start: 03/23/21             Intervention: Monitor for change in patient condition (eg:  Vital signs changes; changes in level of consciousness; nausea; behavioral changes)     Dates: Start: 03/23/21             Intervention: Pain-relief response reassessment (eg: Frequency based on route of administration; effectiveness)     Dates: Start: 03/23/21                         Problem: Patient Education: Go to Patient Education Activity     Dates: Start: 03/19/21       Disciplines: Interdisciplinary    Goal: Patient/Family Education     Dates: Start: 03/19/21   Expected End: 04/01/21       Disciplines: Interdisciplinary                Problem: Patient Education: Go to Patient Education Activity     Dates: Start: 03/19/21       Disciplines: Interdisciplinary    Goal: Patient/Family Education     Dates: Start: 03/19/21   Expected End: 04/02/21       Disciplines: Interdisciplinary                Problem: Pressure Injury - Risk of     Dates: Start: 03/19/21       Disciplines: Interdisciplinary    Goal: *Prevention of pressure injury     Dates: Start: 03/19/21   Expected End: 04/03/21       Description: Document Antelmo Scale and appropriate interventions in the flowsheet.     Disciplines: Interdisciplinary                  Care Plan Problems/Goals      Progressing Towards Goal (8)      *Absence of Falls (Falls - Risk of)    Disciplines:  Interdisciplinary Expected end:  04/03/21        Outcome: Progressing Towards Goal By Rhona Barnes on 03/29/21 0439            Patient/Family Education (Patient Education: Go to Patient Education Activity)    Disciplines:  Interdisciplinary Expected end:  04/01/21        Outcome: Progressing Towards Goal By Rhona Barnes on 03/23/21 1131            *Prevention of pressure injury (Pressure Injury - Risk of)    Disciplines:  Interdisciplinary Expected end:  04/03/21        Outcome: Progressing Towards Goal By Rhona Barnes on 03/29/21 0439            Patient/Family Education (Patient Education: Go to Patient Education Activity)    Disciplines:  Interdisciplinary Expected end:  04/02/21        Outcome: Progressing Towards Goal By Rhona Barnes on 03/29/21 0439            *Control of Pain (Pain)    Disciplines:  Nurse, Interdisciplinary, RT Expected end:  03/30/21        Outcome: Progressing Towards Goal By Rhona Barnes on 03/29/21 0439            Verbalize and demonstrate ability to manage anxiety (Anxiety/Agitation)    Disciplines:  Interdisciplinary Expected end:  03/30/21        Outcome: Progressing Towards Goal By Rhona Barnes on 03/29/21 0439            Demonstrate understanding of and ability to manage respiratory symptoms at end of life (Dyspnea Due to End of Life)    Disciplines:  Interdisciplinary Expected end:  03/29/21        Outcome: Progressing Towards Goal By Mellissa Cohen RN on 03/28/21 3562            Grief heard and acknowledged, anxiety reduced, patient coping identified, patient/family expressed gratitude (Anticipatory Grief)    Disciplines:  Interdisciplinary Expected end:  04/09/21        Outcome: Progressing Towards Goal By Rukhsana Lava on 03/29/21 1213                              ___________________    Care Team Notes          POC/IDG Notes      [facility] IDG Nurse Notes by Silvestre Avendano RN at 03/29/21 1337  Version 1 of 1    Author: Silvestre Avendano RN Service: NURSING Author Type: Registered Nurse    Filed: 03/29/21 1337 Date of Service: 03/29/21 1337 Status: Signed    : Silvestre Avendano RN (Registered Nurse)       Patient: Nancy Molina    Date: 03/29/21  Time: 1:37 PM    [facility] Nurse Notes  Follow UP IDG: Pt is a 72-year-old male with metastatic NSCLC who is here routine level of care for management of pain, shortness of breath, agitation, dysphagia. Lakhani with UOP of 250ml   IV access. Left Subclavian Single Lumen   PO intake: NPO  Oxygen: 3/L  Wounds: None  PRN medications Robinul 0.2mg x 1 for secretions / Ativan x 1 dose for agitation/ Morphine 4mg x 2 doses for pain   Scheduled meds:  Duoneb / Fentanyl 25mcg patch Q 72 hours for pain  Plan: Increase Fentanyl to 37 mcg Q 72 hours for pain / D/C Morphine. Add Dilaudid 1mg Q 30 min PRN. Comprehensive plan of care reviewed. IDG and pt./family in agreement with plan of care. The IDG identifies through on-going assessment when a change is needed to the POC; the pt/family will receive care and services necessitated by changes in POC. Medications reviewed by the pharmacist and Medical Director.         Signed by: Addis Nix RN       [facility] IDG  Notes by Cherrie Dong LMSW at 03/29/21 1044  Version 1 of 1    Author: Cherrie Dong LMSW Service: Licensed Clinical  Author Type:     Filed: 03/29/21 1044 Date of Service: 03/29/21 1044 Status: Signed    : Sami Irvin LMSW ()       THOMAS to assess coping and needs each visit and offer availability. [address] [place] [address] Nurse Notes by Mckenzie Schwartz RN at 03/26/21 1447  Version 1 of 1    Author: Mckenzie Schwartz RN Service: NURSING Author Type: Registered Nurse    Filed: 03/26/21 1447 Date of Service: 03/26/21 1447 Status: Signed    : Mckenzie Schwratz RN (Registered Nurse)       Patient: Dawood Dickinson    Date: 03/26/21  Time: 2:47 PM    [address] [place] [address] Nurse Notes  Follow UP IDG: Pt is a 60-year-old male with metastatic NSCLC who is here routine level of care for management of pain, shortness of breath, agitation, dysphagia. Lakhani with UOP of 100ml   IV access. Left Subclavian Single Lumen   PO intake: NPO  Oxygen: Room air  Wounds: None  PRN medications: Ativan 1mg IV x 1 for anxiety / Morphine 4mg IV x 1 for dyspnea   Scheduled meds:  Duoneb  Plan: Add Fentanyl 25mcg Q 72 hours for pain. Change to GIP on 3/26/2021. Comprehensive plan of care reviewed. IDG and pt./family in agreement with plan of care. The IDG identifies through on-going assessment when a change is needed to the POC; the pt/family will receive care and services necessitated by changes in POC. Medications reviewed by the pharmacist and Medical Director. Signed by: José Miguel Packer RN       [address] [place] [address]  Notes by Enrique Cardona LMSW at 03/26/21 1226  Version 1 of 1    Author: Enrique Cardona LMSW Service: Licensed Clinical  Author Type: Licensed Masters in Social Work    Filed: 03/26/21 1233 Date of Service: 03/26/21 1226 Status: Signed    : Enrique Cardona LMSW (Licensed Masters in Social Work)       SW is following for needs and support. No change in current care plan.        [address] [place] [address]  Notes by Kem Verduzco at 03/26/21 1216  Version 1 of 1    Author: Kem Verduzco Service: Spiritual Care Author Type: Pastoral Care    Filed: 03/26/21 1223 Date of Service: 03/26/21 1216 Status: Signed    : Yuli Armenta (Pastoral Care)       Patient: Rigo Haddad    Date: 03/26/21  Time: 12:16 PM    \[facility]\""  Notes  Intervention: Ministry of presence, conversation with wife and her friend. Prayer with patient and family. Active listening. Words of encouragement. Outcome: Wife was able to voice concerns of how she will care for her  in the event he returns home. Plan: Continue to provide opportunity for open communication. Spiritual rituals as appropriate. Signed by: Isaiah Ohara       [address] [place] [address] Nurse Notes by Juventino Nazario RN at 03/22/21 1455  Version 1 of 1    Author: Juventino Nazario RN Service: NURSING Author Type: Registered Nurse    Filed: 03/22/21 1455 Date of Service: 03/22/21 1455 Status: Signed    : Juventino Nazario RN (Registered Nurse)       Patient: Rigo Haddad    Date: 03/22/21  Time: 2:55 PM    \[facility]\"" Nurse Notes  1st IDG: Pt is a 81-year-old male with Metastatic NSCLC who is here GIP level of care for management of dyspnea, troublesome secretions. Lakhani with UOP of 1050ml   IV access: Left Subclavian Venous Catheter   PO intake: 25%  Oxygen: 3/L  Wounds: Left Lower Arm ST  PRN medications: Robinul 0.2mg x 1 dose for secretions   Scheduled meds:  Decadron 4mg Q 12 / Senokot 8.6mg Q D  Plan: Add Duonebs breathing treatment TID. Comprehensive plan of care reviewed. IDG and pt./family in agreement with plan of care. The IDG identifies through on-going assessment when a change is needed to the POC; the pt/family will receive care and services necessitated by changes in POC. Medications reviewed by the pharmacist and Medical Director.         Signed by: Lavinia Zavala RN       [address] [place] [address]  Notes by Yuli Armenta at 03/22/21 1232  Version 1 of 1    Author: Yuli Armenta Service: Spiritual Care Author Type: Pastoral Care    Filed: 03/22/21 1233 Date of Service: 03/22/21 1232 Status: Signed    : Rob Clancy (Pastoral Care)       Patient: Beka Marquis    Date: 03/22/21  Time: 12:32 PM    [facility]  Notes    / Grief Counselor has reviewed  Initial Comprehensive Assessment and plan of care. Bereavement and Spiritual Care Assessments to be completed and plan of care put in place to meet the needs, requests and referrals. Signed by: Jenna Butler       [facility]  Notes by Rob Clancy at 03/22/21 1122  Version 1 of 1    Author: Rob Clancy Service: Spiritual Care Author Type: Pastoral Care    Filed: 03/22/21 1123 Date of Service: 03/22/21 1122 Status: Signed    : Rob Clancy (Pastoral Care)       Patient: Beka Marquis    Date: 03/22/21  Time: 11:22 AM    [facility]  Notes    Assessments pending for spiritual and bereavement care. Signed by: Jenna Butler       [facility]  Notes by Rob Clancy at 03/22/21 1122  Version 1 of 1    Author: Rob Clancy Service: Spiritual Care Author Type: Pastoral Care    Filed: 03/22/21 1122 Date of Service: 03/22/21 1122 Status: Signed    : Rob Clancy (Pastoral Care)       Patient: Beka Marquis    Date: 03/22/21  Time: 11:22 AM    [facility]  Notes  / Grief Counselor has reviewed  Initial Comprehensive Assessment and plan of care. Bereavement and Spiritual Care Assessments to be completed and plan of care put in place to meet the needs, requests and referrals.         Signed by: Jenna Butler       [address] [place] [address]  Notes by Nydia Camejo LMSW at 03/22/21 1103  Version 1 of 1    Author: Nydia Camejo LMSW Service: Licensed Clinical  Author Type:     Filed: 03/22/21 1104 Date of Service: 03/22/21 1103 Status: Signed    : Nydia Camejo LMSW ()       THOMAS has read the initial comprehensive assessment and plan of care and acknowledges no urgent needs and is in agreement with plan    THOMAS to assess coping and needs each visit and offer availability. Care Team Present:   Team Members Present: Physician, Nurse, , , Other (Comment)  Physician Team Member: Dr. Moira Villa  Nurse Team Member: Addis Nix  Social Work Team Member: Chin Bernal   Team Member: Dena Landa  Other Discipline Present (Name):  Katie Barba NP

## 2021-03-30 NOTE — PROGRESS NOTES
3687 Report received from Mustapha Paredes RN. 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 Pt is currently resting with his eyes closed, RR shallow with periods of apnea. Fentanyl patch in place on pt's right chest. Night shift RN called pt's wife to notify her of change in pt's condition and BP of 69/36.    0721 Pt medicated with Dilaudid 1mg IVP for RR 27/min and shallow. Bed is low and locked, call bell within reach, tab alert in place, side rails up x2. Door remains open for continued monitoring. 0800 Pt is resting with RR 22/min, shallow. Pt's wife and friend are at the bedside. Pt is resting with his eyes closed, no signs or symptoms of distress at this time. FLACC 0/10. Safety measures in place. 1008 Left chest port was flushed with Saline and Heparin per orders. Pt is resting with FLACC 0/10. Wife and friend are at the bedside. Safety measures in place. Door remains closed. 1350 Pt is resting without change in condition. FLACC 0/10. Safety measures in place. 36 Pt was without HR or RR rate and passed peacefully after his wife had left his bedside. Pt's wife was notified by phone and  home was called.

## 2021-03-30 NOTE — PROGRESS NOTES
Pt resting comfortably at this time. Per IDG, pt remains GIP. No family present at bedside. SW remains available.

## 2021-03-30 NOTE — PROGRESS NOTES
Progress Note    Patient: Ewa Marin MRN: [id number]  SSN: [id number]    YOB: 1940  Age: 68 y.o. Sex: male      Admit Date: 3/19/2021    LOS: 11 days     Subjective:     Unresponsive. NPO. Has required dilaudid IV x 3 for dyspnea and glycopyrrolate x 2 for secretions. No urine output in the last 24 hours. Hypotensive with BP 69/36. Review of Systems:  Review of systems not obtained due to patient factors. Objective:     Vitals:    03/28/21 1753 03/29/21 0438 03/29/21 1630 03/30/21 0502   BP: 116/60 (!) 123/59 138/65 (!) 69/36   Pulse: (!) 108 (!) 101 91 100   Resp: 20 21 24 18   Temp: 99 °F (37.2 °C) 98.3 °F (36.8 °C) (!) 96.1 °F (35.6 °C) 99.1 °F (37.3 °C)        Intake and Output:  Current Shift: No intake/output data recorded. Last three shifts: No intake/output data recorded. Physical Exam:   GENERAL: Unresponsive, moderate distress, appears older than stated age, pale  LUNG: Coarse diminished breath sounds with shallow and labored respirations. HEART: irregularly irregular rhythm  ABDOMEN: soft, non-tender. Bowel sounds absent. : Lakhani catheter with rodrigo urine. EXTREMITIES:  Extremities with moderate generalized edema. SKIN: Pale. Cool to touch. Scattered ecchymoses. Skin tear to left forearm with dressing intact. Left port accessed for medication administration. NEUROLOGIC: Unresponsive. No lateralizing deficits noted. Bedbound. Lab/Data Review:  No new labs resulted in the last 24 hours.     Assessment:     Principal Problem:    Primary malignant neoplasm of lung metastatic to other site [facility]) (12/28/2020)    Active Problems:    Acute hypoxemic respiratory failure ([address] (3/12/2021)      Metastatic lung cancer (metastasis from lung to other site) [facility]) (3/19/2021)      Hospice care patient (3/25/2021)        Plan:     Current Facility-Administered Medications   Medication Dose Route Frequency    HYDROmorphone (PF) (DILAUDID) injection 1 mg  1 mg SubCUTAneous Q30MIN PRN    Or    HYDROmorphone (PF) (DILAUDID) injection 1 mg  1 mg IntraVENous Q30MIN PRN    fentaNYL (DURAGESIC) 25 mcg/hr patch 1 Patch  1 Patch TransDERmal Q72H    And    fentaNYL (DURAGESIC) 12 mcg/hr patch 1 Patch  1 Patch TransDERmal Q72H    glycopyrrolate (ROBINUL) injection 0.2 mg  0.2 mg IntraVENous Q4H PRN    sodium chloride (NS) flush 10 mL  10 mL IntraVENous PRN    sodium chloride (NS) flush 10 mL  10 mL IntraVENous Q12H    heparin (porcine) pf 300 Units  300 Units InterCATHeter Q12H    acetaminophen (TYLENOL) suppository 650 mg  650 mg Rectal Q3H PRN    bisacodyL (DULCOLAX) suppository 10 mg  10 mg Rectal PRN    albuterol-ipratropium (DUO-NEB) 2.5 MG-0.5 MG/3 ML  3 mL Nebulization Q8H    albuterol-ipratropium (DUO-NEB) 2.5 MG-0.5 MG/3 ML  3 mL Nebulization Q4H PRN    LORazepam (ATIVAN) injection 1 mg  1 mg IntraVENous Q2H PRN    haloperidol lactate (HALDOL) injection 2 mg  2 mg SubCUTAneous Q1H PRN    Or    haloperidol lactate (HALDOL) injection 2 mg  2 mg IntraVENous Q1H PRN    LORazepam (ATIVAN) injection 2 mg  2 mg IntraVENous Q10MIN PRN       3/19: (E) Admitted [facility] with metastatic lung cancer for management of pain, dyspnea and agitation. 3/23: changed to routine  3/26: changed to GIP      1. Pain: Hydromorphone 1mg IV/SQ Q20 minutes as needed. Fentanyl 25mcg patch in place.      2. Dyspnea: Hydromorphone 1mg IV/SQ Q20 minutes as needed. Duonebs q4 prn. Glycopyrrolate 0.2mg q4 prn secretions. Oxygen at 3 L/min prn.     3. Agitation: Haloperidol 2mg IV/SQ Q1 hour prn and Lorazepam 1mg IV/IM q2 hours prn.     4. Family/Pt Support: No family at bedside during exam. Medications and plan of care discussed with nursing staff. Will continue to monitor for symptoms and adjust medications as needed to maintain patient comfort. PPS 10%. Case discussed with Dr. Pleas Bence.     No changes in plan of care. Expect patient demise in the next 24 hours.  Continue small doses of narcotics frequently due to hypotension.      Signed By: Gabby Bonilla NP     March 30, 2021

## 2021-03-30 NOTE — PROGRESS NOTES
2150 Report received from Yeyo Liu RN. Patient identified by name and . Patient in bed with eyes closed. Patient is lying in bed with eyes closed, no response to stimuli including pain. Respirations with short periods of apnea observed. Patient with audible congestion present with patient not showing any signs of distress at this time. Oxygen at 3 lpm via NC. Lakhani to gravity drain with dark rodrigo urine draining. FLAAC 0/10. Safety measures in place including, door open for continuous monitoring, bed in low locked position, tab alert placed and functional, and side rails up x2. Will continue to monitor for changes and for needs. 2332 Patient lying in bed with his eyes closed open mouth breathing with more frequent periods of apnea and an increase in congestion. Dilaudid 1mg IVP and Robinul 0.2mg IVP given per MD order. Patient is not responsive to any stimuli at this time. Safety measures in place including, door open for continuous monitoring, bed in low locked position, tab alert placed and functional, and side rails up x2. Will continue to monitor for changes and for needs. 0008 Call placed to patient's wife informing her of the changes that have been observed over the past couple of hours. She states she is unable to drive at this time during the dark. Assurance gave that she would be kept up to date on any further changes and if he remains the same she would be contacted before 0700 to let her know of how he is doing. Appreciation voiced by wife. Will continue to monitor for changes and fort needs. 0205 Patient lying in bed with no acute distress noted. Patient is lying in bed with his eyes closed. Respirations are shallow with frequent periods of apnea. No changes since call was made to her. Safety measures in place including, door open for continuous monitoring, bed in low locked position, tab alert placed and functional, and side rails up x2.  Will continue to monitor for changes and for needs.    0400 Patient continues to have shallow respirations with periods of apnea. He does not appear to be in any acute distress at this time. FLACC 0/10. Safety measures in place including, door open for continuous monitoring, bed in low locked position, tab alert placed and functional, and side rails up x2. Will continue to monitor for changes and for needs. 0600 Call placed to patients wife to report a change in patients condition. Patient's respirations are very shallow with longer periods of apnea. Wife states she is on her way to come be with him. Safety measures in place including, door open for continuous monitoring, bed in low locked position, tab alert placed and functional, and side rails up x2. Will continue to monitor for changes and for needs.       Report given to Luisa Hoskins RN

## 2021-03-30 NOTE — PROGRESS NOTES
spoke via telephone with Mrs. Volodymyr Howard following her 's death. She is coping appropriately. She expressed appreciation for the good care her  received.  assured her of continued support available through [facility] and Bereavement.  offered prayer.

## 2021-03-30 NOTE — PROGRESS NOTES
Problem: Falls - Risk of  Goal: *Absence of Falls  Description: Document Haley Blood Fall Risk and appropriate interventions in the flowsheet. Outcome: Progressing Towards Goal  Note: Fall Risk Interventions:       Mentation Interventions: Adequate sleep, hydration, pain control, Bed/chair exit alarm, Door open when patient unattended, Evaluate medications/consider consulting pharmacy    Medication Interventions: Bed/chair exit alarm    Elimination Interventions: Bed/chair exit alarm, Call light in reach              Problem: Pressure Injury - Risk of  Goal: *Prevention of pressure injury  Description: Document Antelmo Scale and appropriate interventions in the flowsheet.   Outcome: Progressing Towards Goal  Note: Pressure Injury Interventions:  Sensory Interventions: Assess changes in LOC, Assess need for specialty bed, Check visual cues for pain, Float heels    Moisture Interventions: Absorbent underpads, Apply protective barrier, creams and emollients, Internal/External urinary devices, Maintain skin hydration (lotion/cream), Minimize layers, Moisture barrier    Activity Interventions: Assess need for specialty bed    Mobility Interventions: Assess need for specialty bed, Float heels    Nutrition Interventions: Document food/fluid/supplement intake    Friction and Shear Interventions: Apply protective barrier, creams and emollients, Minimize layers                Problem: Pain  Goal: *Control of Pain  Outcome: Progressing Towards Goal     Problem: Dyspnea Due to End of Life  Goal: Demonstrate understanding of and ability to manage respiratory symptoms at end of life  Outcome: Progressing Towards Goal

## 2021-03-30 NOTE — PROGRESS NOTES
-Report received from Angie Machado RN. Patient identified by name and . Patient resting quietly in bed with eyes closed. No signs or symptoms of distress, pain, agitation/anxiety, or SOB noted at present. Oxygen in use at 3L NC. Lakhani catheter in place draining rodrigo colored urine. Bed locked and in lowest position, side rails up x 2, call bell within reach, tab alarm in place. No family present at bedside. Door open for continuous monitoring. -Patient resting in bed with eyes closed. Respirations unlabored with no signs or symptoms of distress, pain, agitation, or discomfort noted. Scheduled Duo-neb breathing treatment administered as well as scheduled left chest port flushes. [facility] with a terminal diagnosis of malignant neoplasm of lung metastatic to other site. Patient is GIP level of care for symptom management of pain, dyspnea, and agitation. Patient was recently admitted to [facility] due to suspected CVA. Patient was discharged to rehab and was called on 3/12/2021 due to respiratory distress. Patient required intubation. Patient underwent swallow study during stay and was found that patient had been aspirating. Family elected to have no feeding tube. Patient had been able to answer questions appropriately with nodding head and vocal, but patient is now obtunded with trouble some secretions. Patient requiring skilled nursing assessment, ongoing monitoring, and reevaluation of medication regimen to achieve optimum level of comfort. FLACC 0.     Report given to Derik Hollis RN

## 2021-03-30 NOTE — PROGRESS NOTES
0360 Report received from Jenna Marrufo RN. 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 Pt is currently resting with his eyes closed, RR shallow with periods of apnea. Fentanyl patch in place on pt's right chest. Night shift RN called pt's wife to notify her of change in pt's condition and BP of 69/36. 
 
0721 Pt medicated with Dilaudid 1mg IVP for RR 27/min and shallow. Bed is low and locked, call bell within reach, tab alert in place, side rails up x2. Door remains open for continued monitoring. 0800 Pt is resting with RR 22/min, shallow. Pt's wife and friend are at the bedside. Pt is resting with his eyes closed, no signs or symptoms of distress at this time. FLACC 0/10. Safety measures in place. 1008 Left chest port was flushed with Saline and Heparin per orders. Pt is resting with FLACC 0/10. Wife and friend are at the bedside. Safety measures in place. Door remains closed. 1350 Pt is resting without change in condition. FLACC 0/10. Safety measures in place. 36 Pt was without HR or RR rate and passed peacefully after his wife had left his bedside. Pt's wife was notified by phone and  home was called. Two Fentanyl patches removed and witness by Warren Arevalo RN. One 12mcg and one 25mcg patch placed in drug disposal bottle.

## 2021-03-30 NOTE — PROGRESS NOTES
Progress Note    Patient: Ashlee Graff MRN: [id number]  SSN: [id number]    YOB: 1940  Age: 68 y.o. Sex: male      Admit Date: 3/19/2021    LOS: 10 days     Subjective:     Responds to pain. NPO. Has required morphine x 2 for pain/dyspnea. Review of Systems:  Unable to assess due to patient factors. Objective:      03/28/21 1753 03/29/21 0438   BP: 116/60 (!) 123/59   Pulse: (!) 108 (!) 101   Resp: 20 21   Temp: 99 °F (37.2 °C) 98.3 °F (36.8 °C)        Intake and Output:  Output 250ml    Physical Exam:   GENERAL: Unresponsive, moderate distress, appears older than stated age, pale  LUNG: Coarse diminished breath sounds with labored respirations. HEART: irregularly irregular rhythm  ABDOMEN: soft, non-tender. Bowel sounds absent. : Lakhani catheter with rodrigo urine. EXTREMITIES:  Extremities with no cyanosis. Moderate generalized edema. SKIN: Pale. Warm to touch. Scattered ecchymoses. Skin tear to left forearm with dressing intact. Left port accessed for medication administration. NEUROLOGIC: Unresponsive. No lateralizing deficits noted. Bedbound. Lab/Data Review:  No new labs resulted in the last 24 hours.     Assessment:     Principal Problem:    Primary malignant neoplasm of lung metastatic to other site [facility]) (12/28/2020)    Active Problems:    Acute hypoxemic respiratory failure ([address] (3/12/2021)      Metastatic lung cancer (metastasis from lung to other site) [facility]) (3/19/2021)      Hospice care patient (3/25/2021)        Plan:     Current Facility-Administered Medications   Medication Dose Route Frequency    HYDROmorphone (PF) (DILAUDID) injection 1 mg  1 mg SubCUTAneous Q30MIN PRN    Or    HYDROmorphone (PF) (DILAUDID) injection 1 mg  1 mg IntraVENous Q30MIN PRN    fentaNYL (DURAGESIC) 25 mcg/hr patch 1 Patch  1 Patch TransDERmal Q72H    And    fentaNYL (DURAGESIC) 12 mcg/hr patch 1 Patch  1 Patch TransDERmal Q72H    glycopyrrolate (ROBINUL) injection 0.2 mg  0.2 mg IntraVENous Q4H PRN    sodium chloride (NS) flush 10 mL  10 mL IntraVENous PRN    sodium chloride (NS) flush 10 mL  10 mL IntraVENous Q12H    heparin (porcine) pf 300 Units  300 Units InterCATHeter Q12H    acetaminophen (TYLENOL) suppository 650 mg  650 mg Rectal Q3H PRN    bisacodyL (DULCOLAX) suppository 10 mg  10 mg Rectal PRN    albuterol-ipratropium (DUO-NEB) 2.5 MG-0.5 MG/3 ML  3 mL Nebulization Q8H    albuterol-ipratropium (DUO-NEB) 2.5 MG-0.5 MG/3 ML  3 mL Nebulization Q4H PRN    LORazepam (ATIVAN) injection 1 mg  1 mg IntraVENous Q2H PRN    haloperidol lactate (HALDOL) injection 2 mg  2 mg SubCUTAneous Q1H PRN    Or    haloperidol lactate (HALDOL) injection 2 mg  2 mg IntraVENous Q1H PRN    LORazepam (ATIVAN) injection 2 mg  2 mg IntraVENous Q10MIN PRN     3/19: (E) Admitted [facility] with metastatic lung cancer for management of pain, dyspnea and agitation. 3/23: changed to routine  3/26: changed to [facility]     1. Pain: Hydromorphone 1mg IV/SQ Q20 minutes as needed. 2. Dyspnea: Hydromorphone 1mg IV/SQ Q20 minutes as needed. Duonebs q4 prn. Glycopyrrolate 0.2mg q4 prn secretions. Oxygen at 3 L/min prn.    3. Agitation: Haloperidol 2mg IV/SQ Q1 hour prn and Lorazepam 1mg IV/IM q2 hours prn.    4. Family/Pt Support: No family at bedside during exam. Medications and plan of care discussed with nursing staff. Will continue to monitor for symptoms and adjust medications as needed to maintain patient comfort. PPS 10%. Case discussed with Dr. Josep Villafana. Add fentanyl 25mcg patch. Change to Dilaudid 1mg q30 minutes prn for pain/dyspnea.      Signed By: Kirby Catalan NP     March 29, 2021

## 2022-12-14 NOTE — PROGRESS NOTES
Problem: Pressure Injury - Risk of 
Goal: *Prevention of pressure injury Description: Document Antelmo Scale and appropriate interventions in the flowsheet. Outcome: Progressing Towards Goal 
Note: Pressure Injury Interventions: 
Sensory Interventions: Assess changes in LOC Moisture Interventions: Absorbent underpads Activity Interventions: Pressure redistribution bed/mattress(bed type), PT/OT evaluation Mobility Interventions: HOB 30 degrees or less, Pressure redistribution bed/mattress (bed type) Nutrition Interventions: Document food/fluid/supplement intake Friction and Shear Interventions: HOB 30 degrees or less, Minimize layers Problem: Patient Education: Go to Patient Education Activity Goal: Patient/Family Education Outcome: Progressing Towards Goal 
  
Problem: Falls - Risk of 
Goal: *Absence of Falls Description: Document Clydene Bone Fall Risk and appropriate interventions in the flowsheet. Outcome: Progressing Towards Goal 
Note: Fall Risk Interventions: 
Mobility Interventions: Communicate number of staff needed for ambulation/transfer, Bed/chair exit alarm Mentation Interventions: Adequate sleep, hydration, pain control, Bed/chair exit alarm, Door open when patient unattended Medication Interventions: Bed/chair exit alarm Elimination Interventions: Call light in reach, Bed/chair exit alarm Problem: Patient Education: Go to Patient Education Activity Goal: Patient/Family Education Outcome: Progressing Towards Goal 
  
Problem: Diabetes Self-Management Goal: *Disease process and treatment process Description: Define diabetes and identify own type of diabetes; list 3 options for treating diabetes. Outcome: Progressing Towards Goal 
Goal: *Incorporating nutritional management into lifestyle Description: Describe effect of type, amount and timing of food on blood glucose; list 3 methods for planning meals.  
Outcome: Progressing Towards Goal 
 [facility]  Outpatient Physical Therapy    Treatment Note        Date: 2022  Patient: Almaz Faria  : 1946   Confirmed: Yes  MRN: [id number]  Referring Provider: ANA Rodriguez CNP    Medical Diagnosis: S/P kyphoplasty [G76.603]  Closed traumatic compression fracture of lumbar vertebra[facility] [S32.000A]  Acute pain due to trauma [G89.11]  Acute post-operative pain [G89.18]       Treatment Diagnosis: back pain, impaired mobility    Visit Information:  Insurance: Payor: Veronica Frank / Plan: Shivam Olson PPO / Product Type: Medicare /   PT Visit Information  Onset Date: 10/17/22  PT Insurance Information: Ingris Payton Medicare  Total # of Visits to Date: 5  Plan of Care/Certification Expiration Date: 01/15/23  No Show: 1  Progress Note Due Date: 22  Canceled Appointment: 0  Progress Note Counter: 5/10    Subjective Information:  Subjective: Pt states \"I'm feeling pretty good at the moment. It's enough to irritate me but it's not bad. Therapy is helping. It loosens me up. \"  HEP Compliance:  [x] Good [] Fair [] Poor [] Reports not doing due to:    Pain Screening  Patient Currently in Pain: Yes  Pain Assessment: 0-10  Pain Level: 5  Best Pain Level: 2  Worst Pain Level: 8  Pain Type: Chronic pain  Pain Location: Back  Pain Orientation: Left  Pain Descriptors: Aching  Pain Frequency: Intermittent    Treatment:  Exercises:  Exercises  Exercise 3: FTSTS test: 21.18 sec w/o use of UE's  Exercise 4: bridges w/ PPT 5\"/12  Exercise 5: 3 way SLR x12 b'ly  Exercise 6: Nu step [id number]  Exercise 7: gait drills F/R/L x2 laps each  Exercise 8: Clams x10 b/l  Exercise 9: * add step ups  Exercise 10: SKTC 3/20\" B'ly w/ towel  Exercise 11: piriformis str  Exercise 12: H/L marches x10  Exercise 13: Seated HS stretch 3x30\" b/l  Exercise 20: HEP: clams, piriformis stretch  Treatment Reasoning  Limitations addressed:  Mobility, Strength, Coordination, Balance, Flexibility, Activity tolerance    Manual:   Objective measures 10 min     Objective Measures:      Strength: [] NT  [x] MMT completed:  Strength RLE  R Hip Flexion: 4/5  R Hip ABduction: 4/5  R Hip ADduction: 4-/5  R Knee Flexion: 5/5  R Knee Extension: 4+/5  R Ankle Dorsiflexion: 5/5  Strength LLE  Comment: ABD 4-/5  L Hip Flexion: 4/5  L Knee Flexion: 5/5  L Knee Extension: 5/5  L Ankle Dorsiflexion: 5/5     ROM: [] NT  [x] ROM measurements:   PROM LLE (degrees)  L SLR: 58 deg   PROM RLE (degrees)  R SLR: 53 deg   AROM Lumbar Spine   Lumbar spine general AROM: Rt SB 23 deg, Lt SB 25 deg  Flexion: 50 deg (w/ pain)  Extension: 15     Assessment: Body Structures, Functions, Activity Limitations Requiring Skilled Therapeutic Intervention: Decreased functional mobility , Decreased ADL status, Decreased ROM, Decreased body mechanics, Decreased strength, Decreased endurance, Increased pain  Assessment: Progress note completed today per Medicare guidelines noting improvements in pain, LE strength, transfer ability and function per pt report. Pt reports 25% decreased pain in the AM \"I'm getting out of bed easier. \" Pt notes most difficulty still w/ bending to don socks/shoes and picking up objects from floor. Piriformis stretch introduced to ease this activity. Good jasmeet to gradual progression of mobility/strengh program w/ pain often times reduced by 50% following PT sessions. Pt continuing to wean from use of back brace. Treatment Diagnosis: back pain, impaired mobility  Therapy Prognosis: Good     Post-Pain Assessment:       Pain Rating (0-10 pain scale):   4/10   Location and pain description same as pre-treatment unless indicated.    Action: [] NA   [x] Perform HEP  [] Meds as prescribed  [x] Modalities as prescribed   [] Call Physician     GOALS   Patient Goal(s): Patient Goals : decrease pain, return to 100%, cooking without rest breaks, increased energy    Short Term Goals Completed by 3 wks Goal Status   STG 1 Independent Goal: *Incorporating physical activity into lifestyle Description: State effect of exercise on blood glucose levels. Outcome: Progressing Towards Goal 
Goal: *Developing strategies to promote health/change behavior Description: Define the ABC's of diabetes; identify appropriate screenings, schedule and personal plan for screenings. Outcome: Progressing Towards Goal 
Goal: *Using medications safely Description: State effect of diabetes medications on diabetes; name diabetes medication taking, action and side effects. Outcome: Progressing Towards Goal 
Goal: *Monitoring blood glucose, interpreting and using results Description: Identify recommended blood glucose targets  and personal targets. Outcome: Progressing Towards Goal 
Goal: *Prevention, detection, treatment of acute complications Description: List symptoms of hyper- and hypoglycemia; describe how to treat low blood sugar and actions for lowering  high blood glucose level. Outcome: Progressing Towards Goal 
Goal: *Prevention, detection and treatment of chronic complications Description: Define the natural course of diabetes and describe the relationship of blood glucose levels to long term complications of diabetes. Outcome: Progressing Towards Goal 
Goal: *Developing strategies to address psychosocial issues Description: Describe feelings about living with diabetes; identify support needed and support network Outcome: Progressing Towards Goal 
Goal: *Insulin pump training Outcome: Progressing Towards Goal 
Goal: *Sick day guidelines Outcome: Progressing Towards Goal 
Goal: *Patient Specific Goal (EDIT GOAL, INSERT TEXT) Outcome: Progressing Towards Goal 
  
Problem: Patient Education: Go to Patient Education Activity Goal: Patient/Family Education Outcome: Progressing Towards Goal 
  
Problem: Patient Education: Go to Patient Education Activity Goal: Patient/Family Education Outcome: Progressing Towards Goal 
  
 Problem: TIA/CVA Stroke: 0-24 hours Goal: Off Pathway (Use only if patient is Off Pathway) Outcome: Progressing Towards Goal 
Goal: Activity/Safety Outcome: Progressing Towards Goal 
Goal: Consults, if ordered Outcome: Progressing Towards Goal 
Goal: Diagnostic Test/Procedures Outcome: Progressing Towards Goal 
Goal: Nutrition/Diet Outcome: Progressing Towards Goal 
Goal: Discharge Planning Outcome: Progressing Towards Goal 
Goal: Medications Outcome: Progressing Towards Goal 
Goal: Respiratory Outcome: Progressing Towards Goal 
Goal: Treatments/Interventions/Procedures Outcome: Progressing Towards Goal 
Goal: Minimize risk of bleeding post-thrombolytic infusion Outcome: Progressing Towards Goal 
Goal: Monitor for complications post-thrombolytic infusion Outcome: Progressing Towards Goal 
Goal: Psychosocial 
Outcome: Progressing Towards Goal 
Goal: *Hemodynamically stable Outcome: Progressing Towards Goal 
Goal: *Neurologically stable Description: Absence of additional neurological deficits Outcome: Progressing Towards Goal 
Goal: *Verbalizes anxiety and depression are reduced or absent Outcome: Progressing Towards Goal 
Goal: *Absence of Signs of Aspiration on Current Diet Outcome: Progressing Towards Goal 
Goal: *Absence of deep venous thrombosis signs and symptoms(Stroke Metric) Outcome: Progressing Towards Goal 
Goal: *Ability to perform ADLs and demonstrates progressive mobility and function Outcome: Progressing Towards Goal 
Goal: *Stroke education started(Stroke Metric) Outcome: Progressing Towards Goal 
Goal: *Dysphagia screen performed(Stroke Metric) Outcome: Progressing Towards Goal 
Goal: *Rehab consulted(Stroke Metric) Outcome: Progressing Towards Goal 
  
Problem: TIA/CVA Stroke: Day 2 Until Discharge Goal: Off Pathway (Use only if patient is Off Pathway) Outcome: Progressing Towards Goal 
Goal: Activity/Safety Outcome: Progressing Towards Goal 
 with HEP In progress   STG 2 report 25% reduction in pain with </= 6/10 in the morning In progress     Long Term Goals Completed by 6 wks Goal Status   LTG 1 Improve bilateral LE strength >/= 4+/5 to improve tolerance to cooking and walking In progress   LTG 2 Improve ease with transfers, five times sit to stand </= 17 seconds In progress   LTG 3 Oswestry </= 15 to demonstrate improved overall activity tolerance In progress   LTG 4 Pt will improve endurance to tolerate 20 min activity without rest break Met     Plan:  Frequency/Duration:  Plan  Plan Frequency: 2  Plan weeks: 6  Current Treatment Recommendations: Strengthening, ROM, Balance training, Functional mobility training, Transfer training, Endurance training, Gait training, Manual, Home exercise program, Patient/Caregiver education & training, Modalities, Equipment evaluation, education, & procurement  Additional Comments: PN completed this date per Medicare  Pt to continue current HEP. See objective section for any therapeutic exercise changes, additions or modifications this date.     Therapy Time:      PT Individual Minutes  Time In: 4187  Time Out: 0358  Minutes: 40  Timed Code Treatment Minutes: 40 Minutes  Procedure Minutes: N/A   Timed Activity Minutes Units   Ther Ex 30 2   Manual  10 1     Electronically signed by Arnold Duque PTA on 12/14/22 at 3:53 PM EST Goal: Diagnostic Test/Procedures Outcome: Progressing Towards Goal 
Goal: Nutrition/Diet Outcome: Progressing Towards Goal 
Goal: Discharge Planning Outcome: Progressing Towards Goal 
Goal: Medications Outcome: Progressing Towards Goal 
Goal: Respiratory Outcome: Progressing Towards Goal 
Goal: Treatments/Interventions/Procedures Outcome: Progressing Towards Goal 
Goal: Psychosocial 
Outcome: Progressing Towards Goal 
Goal: *Verbalizes anxiety and depression are reduced or absent Outcome: Progressing Towards Goal 
Goal: *Absence of aspiration Outcome: Progressing Towards Goal 
Goal: *Absence of deep venous thrombosis signs and symptoms(Stroke Metric) Outcome: Progressing Towards Goal 
Goal: *Optimal pain control at patient's stated goal 
Outcome: Progressing Towards Goal 
Goal: *Tolerating diet Outcome: Progressing Towards Goal 
Goal: *Ability to perform ADLs and demonstrates progressive mobility and function Outcome: Progressing Towards Goal 
Goal: *Stroke education continued(Stroke Metric) Outcome: Progressing Towards Goal 
  
Problem: Ischemic Stroke: Discharge Outcomes Goal: *Verbalizes anxiety and depression are reduced or absent Outcome: Progressing Towards Goal 
Goal: *Verbalize understanding of risk factor modification(Stroke Metric) Outcome: Progressing Towards Goal 
Goal: *Hemodynamically stable Outcome: Progressing Towards Goal 
Goal: *Absence of aspiration pneumonia Outcome: Progressing Towards Goal 
Goal: *Aware of needed dietary changes Outcome: Progressing Towards Goal 
Goal: *Verbalize understanding of prescribed medications including anti-coagulants, anti-lipid, and/or anti-platelets(Stroke Metric) Outcome: Progressing Towards Goal 
Goal: *Tolerating diet Outcome: Progressing Towards Goal 
Goal: *Aware of follow-up diagnostics related to anticoagulants Outcome: Progressing Towards Goal 
Goal: *Ability to perform ADLs and demonstrates progressive mobility and function Outcome: Progressing Towards Goal 
Goal: *Absence of DVT(Stroke Metric) Outcome: Progressing Towards Goal 
Goal: *Absence of aspiration Outcome: Progressing Towards Goal 
Goal: *Optimal pain control at patient's stated goal 
Outcome: Progressing Towards Goal 
Goal: *Home safety concerns addressed Outcome: Progressing Towards Goal 
Goal: *Describes available resources and support systems Outcome: Progressing Towards Goal 
Goal: *Verbalizes understanding of activation of EMS(911) for stroke symptoms(Stroke Metric) Outcome: Progressing Towards Goal 
Goal: *Understands and describes signs and symptoms to report to providers(Stroke Metric) Outcome: Progressing Towards Goal 
Goal: *Neurolgocially stable (absence of additional neurological deficits) Outcome: Progressing Towards Goal 
Goal: *Verbalizes importance of follow-up with primary care physician(Stroke Metric) Outcome: Progressing Towards Goal 
Goal: *Smoking cessation discussed,if applicable(Stroke Metric) Outcome: Progressing Towards Goal 
Goal: *Depression screening completed(Stroke Metric) Outcome: Progressing Towards Goal

## 2023-01-18 NOTE — PROGRESS NOTES
Nutrition Note Pt extubated and FT removed 3/14. Bedside SLP eval 3/15 with recommendation for NPO and plan for [facility] 3/16 Will follow up for POC concerning nutrition once MBS completed. Discussed with Ignacio Brown RN Electronically signed by Leticia Mullen RD on 3/15/2021 at 4:50 PM 
 
Contact: [phone] Post op instructions reviewed with patients including the use of drops.

## 2023-02-13 NOTE — PROGRESS NOTES
[id number] Report receid from Christ Ni RN. Pt was admited on 3/19 GIP status and on 3/23 was changed to Routine. Pt is currently resting in bed with his eyes open, pt is not tracking. No signs or symptoms of pain or distress noted, FLACC 0/10. Bed is low and locked, call bell within reach, tab alert in place. Door remains open for continued monitoring. 0820 Pt is resting with his eyes open. Left chest port flushed with Saline and Heparin per orders. Attempt made to feed patient breakfast but he does not swallow mariam drops of OJ from a syringe and did not respond to spoon. Pt shook his head \"no\" when asked if he had pain but is non verbal at this time. Safety measures in place. Door remains open for continued monitoring. 1130 Pt continues to rest with his eyes closed, no signs or symptoms of pain or distress noted. FLACC 0/10. Safety measures in place. Door remains open for continued monitoring. 1320 Pt resting in bed with his eyes open, wife is at the bedside. Pt shakes his head 'yes' when asked if he had pain. Morphine 4mg IVP given via chest port. Scheduled Nebulizer treatment given. Pt is not tracking with his eyes but will make brief contact when asked to. Pt nods yes/no appropriately. Safety measures in place. Door is closed while wife is at the bedside. 1430 Pt resting with his eyes closed, FLACC 0/10. Wife at the bedside. Safety measures in place. Door remains closed while wife is visiting. 1630 No change in status, pt resting with his eyes closed. FLACC 0/10. Wife has left [facility]. Door remains open for continued monitoring. 1800 Pt resting in bed with his eyes closed, FLACC 0/10. Safety measures in place. Door remains open for continued monitoring. Report given to Christ Ni RN. Sotyktu Counseling:  I discussed the most common side effects of Sotyktu including: common cold, sore throat, sinus infections, cold sores, canker sores, folliculitis, and acne.? I also discussed more serious side effects of Sotyktu including but not limited to: serious allergic reactions; increased risk for infections such as TB; cancers such as lymphomas; rhabdomyolysis and elevated CPK; and elevated triglycerides and liver enzymes.?

## 2024-01-25 NOTE — PROGRESS NOTES
Pt arrived ambulatory to [facility]. Port accessed with good blood return. NS infusing. Consent signed for D1C1 today. Pre meds given as ordered. Keytruda infused. Alimta infused. 30 minute wait time. Carboplatin infusing. Pt aware of next appt on 2/3/21 at 0930. Treatment tolerated well. Port flushed and de accessed. Pt discharged ambulatory. Pt is a "89 yo F with PMHx of HTN, BOB (post-stent), and CVA presenting for mechanical fall from home and admitted for rhabdomyolysis. While here patient found to have UTI, paroxysmal atrial fibrillation, and new chronic HFrEF."    Visited pt at bedside this am. Pt reports fair appetite/intake. Denies changes to po intake lately. PO intakes 0-50% per nursing documentation. No food allergies. No chewing/swallowing difficulties. Denies N/V. +BM 1/24; bowel regimen rx. No ht/wt documented in EMR on admit. Pt reports ht of 5'0", wt of 120#. States weight has been stable. 1+ R ankle edema noted. Skin: stage I pressure ulcer to sacrum. Pt currently on DASH/TLC diet. Offered diet education, pt declined at this time. Pt offers no food preferences/meal requests at this time. Declines oral nutritional supplements. Recommend continued assistance/encouragement at meal times. RD remains available and will continue to follow-up.

## (undated) DEVICE — KIT THORCENT 8FR L5IN POLYUR W/ 18/22/25GA NDL 3 W STPCOCK

## (undated) DEVICE — GEL MEDC ULTRASOUND 5L -- REPLACED BY 326862

## (undated) DEVICE — STERILE POLYISOPRENE POWDER-FREE SURGICAL GLOVES: Brand: PROTEXIS